# Patient Record
Sex: MALE | Race: WHITE | NOT HISPANIC OR LATINO | Employment: OTHER | URBAN - METROPOLITAN AREA
[De-identification: names, ages, dates, MRNs, and addresses within clinical notes are randomized per-mention and may not be internally consistent; named-entity substitution may affect disease eponyms.]

---

## 2017-01-16 ENCOUNTER — COMMUNICATION - HEALTHEAST (OUTPATIENT)
Dept: CARDIOLOGY | Facility: CLINIC | Age: 82
End: 2017-01-16

## 2017-01-16 DIAGNOSIS — I10 UNSPECIFIED ESSENTIAL HYPERTENSION: ICD-10-CM

## 2017-04-16 ENCOUNTER — COMMUNICATION - HEALTHEAST (OUTPATIENT)
Dept: CARDIOLOGY | Facility: CLINIC | Age: 82
End: 2017-04-16

## 2017-04-16 DIAGNOSIS — I10 UNSPECIFIED ESSENTIAL HYPERTENSION: ICD-10-CM

## 2017-07-24 ENCOUNTER — RECORDS - HEALTHEAST (OUTPATIENT)
Dept: LAB | Facility: CLINIC | Age: 82
End: 2017-07-24

## 2017-07-24 LAB
CHOLEST SERPL-MCNC: 127 MG/DL
FASTING STATUS PATIENT QL REPORTED: NORMAL
HDLC SERPL-MCNC: 56 MG/DL
LDLC SERPL CALC-MCNC: 55 MG/DL
TRIGL SERPL-MCNC: 78 MG/DL

## 2017-09-25 ENCOUNTER — RECORDS - HEALTHEAST (OUTPATIENT)
Dept: ADMINISTRATIVE | Facility: OTHER | Age: 82
End: 2017-09-25

## 2017-10-25 ENCOUNTER — AMBULATORY - HEALTHEAST (OUTPATIENT)
Dept: CARDIOLOGY | Facility: CLINIC | Age: 82
End: 2017-10-25

## 2017-10-25 ENCOUNTER — RECORDS - HEALTHEAST (OUTPATIENT)
Dept: ADMINISTRATIVE | Facility: OTHER | Age: 82
End: 2017-10-25

## 2017-10-25 ENCOUNTER — OFFICE VISIT - HEALTHEAST (OUTPATIENT)
Dept: CARDIOLOGY | Facility: CLINIC | Age: 82
End: 2017-10-25

## 2017-10-25 DIAGNOSIS — I10 ESSENTIAL HYPERTENSION: ICD-10-CM

## 2017-10-25 DIAGNOSIS — I25.10 ATHEROSCLEROSIS OF NATIVE CORONARY ARTERY OF NATIVE HEART WITHOUT ANGINA PECTORIS: ICD-10-CM

## 2017-10-25 DIAGNOSIS — E78.5 HYPERLIPIDEMIA LDL GOAL <70: ICD-10-CM

## 2017-10-25 ASSESSMENT — MIFFLIN-ST. JEOR: SCORE: 1416.49

## 2017-11-20 ENCOUNTER — COMMUNICATION - HEALTHEAST (OUTPATIENT)
Dept: CARDIOLOGY | Facility: CLINIC | Age: 82
End: 2017-11-20

## 2017-11-20 DIAGNOSIS — I25.10 CAD (CORONARY ATHEROSCLEROTIC DISEASE): ICD-10-CM

## 2017-11-20 DIAGNOSIS — I10 HTN (HYPERTENSION): ICD-10-CM

## 2018-07-25 ENCOUNTER — RECORDS - HEALTHEAST (OUTPATIENT)
Dept: LAB | Facility: CLINIC | Age: 83
End: 2018-07-25

## 2018-07-25 LAB
ALBUMIN SERPL-MCNC: 3.8 G/DL (ref 3.5–5)
ALP SERPL-CCNC: 63 U/L (ref 45–120)
ALT SERPL W P-5'-P-CCNC: 15 U/L (ref 0–45)
ANION GAP SERPL CALCULATED.3IONS-SCNC: 7 MMOL/L (ref 5–18)
AST SERPL W P-5'-P-CCNC: 18 U/L (ref 0–40)
BILIRUB SERPL-MCNC: 0.7 MG/DL (ref 0–1)
BUN SERPL-MCNC: 18 MG/DL (ref 8–28)
CALCIUM SERPL-MCNC: 9.5 MG/DL (ref 8.5–10.5)
CHLORIDE BLD-SCNC: 106 MMOL/L (ref 98–107)
CHOLEST SERPL-MCNC: 123 MG/DL
CO2 SERPL-SCNC: 28 MMOL/L (ref 22–31)
CREAT SERPL-MCNC: 0.91 MG/DL (ref 0.7–1.3)
FASTING STATUS PATIENT QL REPORTED: YES
GFR SERPL CREATININE-BSD FRML MDRD: >60 ML/MIN/1.73M2
GLUCOSE BLD-MCNC: 97 MG/DL (ref 70–125)
HDLC SERPL-MCNC: 47 MG/DL
LDLC SERPL CALC-MCNC: 57 MG/DL
POTASSIUM BLD-SCNC: 5 MMOL/L (ref 3.5–5)
PROT SERPL-MCNC: 6.6 G/DL (ref 6–8)
SODIUM SERPL-SCNC: 141 MMOL/L (ref 136–145)
TRIGL SERPL-MCNC: 94 MG/DL

## 2018-10-22 ENCOUNTER — AMBULATORY - HEALTHEAST (OUTPATIENT)
Dept: CARDIOLOGY | Facility: CLINIC | Age: 83
End: 2018-10-22

## 2018-10-22 ENCOUNTER — RECORDS - HEALTHEAST (OUTPATIENT)
Dept: ADMINISTRATIVE | Facility: OTHER | Age: 83
End: 2018-10-22

## 2018-10-25 ENCOUNTER — OFFICE VISIT - HEALTHEAST (OUTPATIENT)
Dept: CARDIOLOGY | Facility: CLINIC | Age: 83
End: 2018-10-25

## 2018-10-25 DIAGNOSIS — I25.10 ATHEROSCLEROSIS OF NATIVE CORONARY ARTERY OF NATIVE HEART, ANGINA PRESENCE UNSPECIFIED: ICD-10-CM

## 2018-10-25 DIAGNOSIS — I10 ESSENTIAL HYPERTENSION: ICD-10-CM

## 2018-10-25 DIAGNOSIS — E78.5 HYPERLIPIDEMIA LDL GOAL <70: ICD-10-CM

## 2018-10-25 RX ORDER — VIT A/VIT C/VIT E/ZINC/COPPER 4296-226
1 CAPSULE ORAL 2 TIMES DAILY
Status: ON HOLD | COMMUNITY
Start: 2018-10-25 | End: 2022-01-01

## 2018-10-25 ASSESSMENT — MIFFLIN-ST. JEOR: SCORE: 1407.42

## 2018-11-01 ENCOUNTER — COMMUNICATION - HEALTHEAST (OUTPATIENT)
Dept: CARDIOLOGY | Facility: CLINIC | Age: 83
End: 2018-11-01

## 2018-11-01 DIAGNOSIS — R68.89 DECREASED EXERCISE TOLERANCE: ICD-10-CM

## 2018-11-01 DIAGNOSIS — I25.10 CAD (CORONARY ARTERY DISEASE): ICD-10-CM

## 2018-11-05 ENCOUNTER — COMMUNICATION - HEALTHEAST (OUTPATIENT)
Dept: CARDIOLOGY | Facility: CLINIC | Age: 83
End: 2018-11-05

## 2018-11-10 ENCOUNTER — COMMUNICATION - HEALTHEAST (OUTPATIENT)
Dept: CARDIOLOGY | Facility: CLINIC | Age: 83
End: 2018-11-10

## 2018-11-10 DIAGNOSIS — I25.10 CAD (CORONARY ATHEROSCLEROTIC DISEASE): ICD-10-CM

## 2018-11-10 DIAGNOSIS — I10 HTN (HYPERTENSION): ICD-10-CM

## 2018-11-14 ENCOUNTER — HOSPITAL ENCOUNTER (OUTPATIENT)
Dept: NUCLEAR MEDICINE | Facility: CLINIC | Age: 83
Discharge: HOME | End: 2018-11-14
Attending: INTERNAL MEDICINE

## 2018-11-14 ENCOUNTER — HOSPITAL ENCOUNTER (OUTPATIENT)
Dept: CARDIOLOGY | Facility: CLINIC | Age: 83
Discharge: HOME | End: 2018-11-14
Attending: INTERNAL MEDICINE

## 2018-11-14 DIAGNOSIS — R68.89 DECREASED EXERCISE TOLERANCE: ICD-10-CM

## 2018-11-14 DIAGNOSIS — I25.10 CAD (CORONARY ARTERY DISEASE): ICD-10-CM

## 2018-11-14 LAB
CV STRESS CURRENT BP HE: NORMAL
CV STRESS CURRENT HR HE: 69
CV STRESS CURRENT HR HE: 69
CV STRESS CURRENT HR HE: 70
CV STRESS CURRENT HR HE: 78
CV STRESS CURRENT HR HE: 78
CV STRESS CURRENT HR HE: 79
CV STRESS CURRENT HR HE: 80
CV STRESS CURRENT HR HE: 80
CV STRESS CURRENT HR HE: 81
CV STRESS CURRENT HR HE: 81
CV STRESS CURRENT HR HE: 85
CV STRESS CURRENT HR HE: 85
CV STRESS CURRENT HR HE: 87
CV STRESS CURRENT HR HE: 88
CV STRESS CURRENT HR HE: 89
CV STRESS CURRENT HR HE: 91
CV STRESS DEVIATION TIME HE: NORMAL
CV STRESS ECHO PERCENT HR HE: NORMAL
CV STRESS EXERCISE STAGE HE: NORMAL
CV STRESS FINAL RESTING BP HE: NORMAL
CV STRESS FINAL RESTING HR HE: 78
CV STRESS MAX HR HE: 91
CV STRESS MAX TREADMILL GRADE HE: 0
CV STRESS MAX TREADMILL SPEED HE: 0
CV STRESS PEAK DIA BP HE: NORMAL
CV STRESS PEAK SYS BP HE: NORMAL
CV STRESS PHASE HE: NORMAL
CV STRESS PROTOCOL HE: NORMAL
CV STRESS RESTING PT POSITION HE: NORMAL
CV STRESS ST DEVIATION AMOUNT HE: NORMAL
CV STRESS ST DEVIATION ELEVATION HE: NORMAL
CV STRESS ST EVELATION AMOUNT HE: NORMAL
CV STRESS TEST TYPE HE: NORMAL
CV STRESS TOTAL STAGE TIME MIN 1 HE: NORMAL
NUC STRESS EJECTION FRACTION: 60 %
STRESS ECHO BASELINE BP: NORMAL
STRESS ECHO BASELINE HR: 69
STRESS ECHO CALCULATED PERCENT HR: 69 %
STRESS ECHO LAST STRESS BP: NORMAL
STRESS ECHO LAST STRESS HR: 85

## 2018-12-20 ENCOUNTER — RECORDS - HEALTHEAST (OUTPATIENT)
Dept: LAB | Facility: CLINIC | Age: 83
End: 2018-12-20

## 2018-12-20 LAB
ALBUMIN SERPL-MCNC: 4 G/DL (ref 3.5–5)
ALP SERPL-CCNC: 55 U/L (ref 45–120)
ALT SERPL W P-5'-P-CCNC: 13 U/L (ref 0–45)
ANION GAP SERPL CALCULATED.3IONS-SCNC: 9 MMOL/L (ref 5–18)
AST SERPL W P-5'-P-CCNC: 19 U/L (ref 0–40)
BASOPHILS # BLD AUTO: 0 THOU/UL (ref 0–0.2)
BASOPHILS NFR BLD AUTO: 1 % (ref 0–2)
BILIRUB SERPL-MCNC: 0.7 MG/DL (ref 0–1)
BUN SERPL-MCNC: 21 MG/DL (ref 8–28)
CALCIUM SERPL-MCNC: 9.9 MG/DL (ref 8.5–10.5)
CHLORIDE BLD-SCNC: 104 MMOL/L (ref 98–107)
CO2 SERPL-SCNC: 28 MMOL/L (ref 22–31)
CREAT SERPL-MCNC: 0.85 MG/DL (ref 0.7–1.3)
EOSINOPHIL # BLD AUTO: 0.1 THOU/UL (ref 0–0.4)
EOSINOPHIL NFR BLD AUTO: 1 % (ref 0–6)
ERYTHROCYTE [DISTWIDTH] IN BLOOD BY AUTOMATED COUNT: 12.1 % (ref 11–14.5)
GFR SERPL CREATININE-BSD FRML MDRD: >60 ML/MIN/1.73M2
GLUCOSE BLD-MCNC: 118 MG/DL (ref 70–125)
HCT VFR BLD AUTO: 44.9 % (ref 40–54)
HGB BLD-MCNC: 14.1 G/DL (ref 14–18)
LYMPHOCYTES # BLD AUTO: 1.3 THOU/UL (ref 0.8–4.4)
LYMPHOCYTES NFR BLD AUTO: 17 % (ref 20–40)
MCH RBC QN AUTO: 31.3 PG (ref 27–34)
MCHC RBC AUTO-ENTMCNC: 31.4 G/DL (ref 32–36)
MCV RBC AUTO: 100 FL (ref 80–100)
MONOCYTES # BLD AUTO: 0.7 THOU/UL (ref 0–0.9)
MONOCYTES NFR BLD AUTO: 9 % (ref 2–10)
NEUTROPHILS # BLD AUTO: 5.6 THOU/UL (ref 2–7.7)
NEUTROPHILS NFR BLD AUTO: 72 % (ref 50–70)
PLATELET # BLD AUTO: 261 THOU/UL (ref 140–440)
PMV BLD AUTO: 10.1 FL (ref 8.5–12.5)
POTASSIUM BLD-SCNC: 4.7 MMOL/L (ref 3.5–5)
PROT SERPL-MCNC: 6.7 G/DL (ref 6–8)
RBC # BLD AUTO: 4.51 MILL/UL (ref 4.4–6.2)
SODIUM SERPL-SCNC: 141 MMOL/L (ref 136–145)
WBC: 7.8 THOU/UL (ref 4–11)

## 2019-01-11 ENCOUNTER — SURGERY - HEALTHEAST (OUTPATIENT)
Dept: GASTROENTEROLOGY | Facility: HOSPITAL | Age: 84
End: 2019-01-11

## 2019-01-11 ASSESSMENT — MIFFLIN-ST. JEOR: SCORE: 1362.06

## 2019-03-28 ENCOUNTER — RECORDS - HEALTHEAST (OUTPATIENT)
Dept: LAB | Facility: CLINIC | Age: 84
End: 2019-03-28

## 2019-03-28 LAB
ALBUMIN SERPL-MCNC: 3 G/DL (ref 3.5–5)
ALP SERPL-CCNC: 100 U/L (ref 45–120)
ALT SERPL W P-5'-P-CCNC: 32 U/L (ref 0–45)
ANION GAP SERPL CALCULATED.3IONS-SCNC: 11 MMOL/L (ref 5–18)
AST SERPL W P-5'-P-CCNC: 18 U/L (ref 0–40)
BILIRUB SERPL-MCNC: 0.4 MG/DL (ref 0–1)
BUN SERPL-MCNC: 19 MG/DL (ref 8–28)
CALCIUM SERPL-MCNC: 9.8 MG/DL (ref 8.5–10.5)
CHLORIDE BLD-SCNC: 102 MMOL/L (ref 98–107)
CO2 SERPL-SCNC: 26 MMOL/L (ref 22–31)
CREAT SERPL-MCNC: 0.87 MG/DL (ref 0.7–1.3)
GFR SERPL CREATININE-BSD FRML MDRD: >60 ML/MIN/1.73M2
GLUCOSE BLD-MCNC: 108 MG/DL (ref 70–125)
POTASSIUM BLD-SCNC: 5.4 MMOL/L (ref 3.5–5)
PROT SERPL-MCNC: 6.6 G/DL (ref 6–8)
SODIUM SERPL-SCNC: 139 MMOL/L (ref 136–145)

## 2019-03-31 LAB — BACTERIA SPEC CULT: ABNORMAL

## 2019-05-08 ENCOUNTER — COMMUNICATION - HEALTHEAST (OUTPATIENT)
Dept: CARDIOLOGY | Facility: CLINIC | Age: 84
End: 2019-05-08

## 2019-05-08 DIAGNOSIS — I10 HTN (HYPERTENSION): ICD-10-CM

## 2019-05-08 DIAGNOSIS — I25.10 CAD (CORONARY ATHEROSCLEROTIC DISEASE): ICD-10-CM

## 2019-08-19 ENCOUNTER — RECORDS - HEALTHEAST (OUTPATIENT)
Dept: LAB | Facility: CLINIC | Age: 84
End: 2019-08-19

## 2019-08-19 LAB
ALBUMIN SERPL-MCNC: 4.1 G/DL (ref 3.5–5)
ALP SERPL-CCNC: 71 U/L (ref 45–120)
ALT SERPL W P-5'-P-CCNC: 28 U/L (ref 0–45)
ANION GAP SERPL CALCULATED.3IONS-SCNC: 7 MMOL/L (ref 5–18)
AST SERPL W P-5'-P-CCNC: 28 U/L (ref 0–40)
BILIRUB SERPL-MCNC: 0.7 MG/DL (ref 0–1)
BUN SERPL-MCNC: 22 MG/DL (ref 8–28)
CALCIUM SERPL-MCNC: 10 MG/DL (ref 8.5–10.5)
CHLORIDE BLD-SCNC: 103 MMOL/L (ref 98–107)
CHOLEST SERPL-MCNC: 152 MG/DL
CO2 SERPL-SCNC: 30 MMOL/L (ref 22–31)
CREAT SERPL-MCNC: 0.87 MG/DL (ref 0.7–1.3)
FASTING STATUS PATIENT QL REPORTED: YES
GFR SERPL CREATININE-BSD FRML MDRD: >60 ML/MIN/1.73M2
GLUCOSE BLD-MCNC: 114 MG/DL (ref 70–125)
HDLC SERPL-MCNC: 52 MG/DL
LDLC SERPL CALC-MCNC: 80 MG/DL
POTASSIUM BLD-SCNC: 4.7 MMOL/L (ref 3.5–5)
PROT SERPL-MCNC: 6.9 G/DL (ref 6–8)
SODIUM SERPL-SCNC: 140 MMOL/L (ref 136–145)
TRIGL SERPL-MCNC: 102 MG/DL

## 2019-09-25 ENCOUNTER — RECORDS - HEALTHEAST (OUTPATIENT)
Dept: LAB | Facility: CLINIC | Age: 84
End: 2019-09-25

## 2019-09-28 LAB — BACTERIA SPEC CULT: ABNORMAL

## 2019-11-03 ENCOUNTER — COMMUNICATION - HEALTHEAST (OUTPATIENT)
Dept: CARDIOLOGY | Facility: CLINIC | Age: 84
End: 2019-11-03

## 2019-11-03 DIAGNOSIS — I10 HTN (HYPERTENSION): ICD-10-CM

## 2019-11-03 DIAGNOSIS — I25.10 CAD (CORONARY ATHEROSCLEROTIC DISEASE): ICD-10-CM

## 2019-11-04 RX ORDER — METOPROLOL SUCCINATE 50 MG/1
TABLET, EXTENDED RELEASE ORAL
Qty: 90 TABLET | Refills: 0 | Status: SHIPPED | OUTPATIENT
Start: 2019-11-04 | End: 2022-01-01

## 2019-11-27 ENCOUNTER — RECORDS - HEALTHEAST (OUTPATIENT)
Dept: ADMINISTRATIVE | Facility: OTHER | Age: 84
End: 2019-11-27

## 2019-12-03 ENCOUNTER — OFFICE VISIT - HEALTHEAST (OUTPATIENT)
Dept: CARDIOLOGY | Facility: CLINIC | Age: 84
End: 2019-12-03

## 2019-12-03 DIAGNOSIS — E78.5 HYPERLIPIDEMIA LDL GOAL <70: ICD-10-CM

## 2019-12-03 DIAGNOSIS — I10 ESSENTIAL HYPERTENSION: ICD-10-CM

## 2019-12-03 DIAGNOSIS — I25.10 ATHEROSCLEROSIS OF NATIVE CORONARY ARTERY OF NATIVE HEART WITHOUT ANGINA PECTORIS: ICD-10-CM

## 2019-12-03 ASSESSMENT — MIFFLIN-ST. JEOR: SCORE: 1398.35

## 2020-01-20 ENCOUNTER — OFFICE VISIT - HEALTHEAST (OUTPATIENT)
Dept: GERIATRICS | Facility: CLINIC | Age: 85
End: 2020-01-20

## 2020-01-20 DIAGNOSIS — M25.552 LEFT HIP PAIN: ICD-10-CM

## 2020-01-20 DIAGNOSIS — R33.9 URINARY RETENTION: ICD-10-CM

## 2020-01-20 DIAGNOSIS — Z71.89 ACP (ADVANCE CARE PLANNING): ICD-10-CM

## 2020-01-20 DIAGNOSIS — Z87.81 S/P LEFT HIP FRACTURE: ICD-10-CM

## 2020-01-21 ENCOUNTER — OFFICE VISIT - HEALTHEAST (OUTPATIENT)
Dept: GERIATRICS | Facility: CLINIC | Age: 85
End: 2020-01-21

## 2020-01-21 DIAGNOSIS — M25.552 LEFT HIP PAIN: ICD-10-CM

## 2020-01-21 DIAGNOSIS — R50.9 FEVER, UNSPECIFIED FEVER CAUSE: ICD-10-CM

## 2020-01-21 DIAGNOSIS — I95.1 ORTHOSTATIC HYPOTENSION: ICD-10-CM

## 2020-01-21 DIAGNOSIS — Z87.81 S/P LEFT HIP FRACTURE: ICD-10-CM

## 2020-01-21 DIAGNOSIS — R33.9 URINARY RETENTION: ICD-10-CM

## 2020-01-22 ENCOUNTER — OFFICE VISIT - HEALTHEAST (OUTPATIENT)
Dept: GERIATRICS | Facility: CLINIC | Age: 85
End: 2020-01-22

## 2020-01-22 DIAGNOSIS — R33.9 URINARY RETENTION: ICD-10-CM

## 2020-01-22 DIAGNOSIS — Z87.81 S/P LEFT HIP FRACTURE: ICD-10-CM

## 2020-01-22 RX ORDER — TAMSULOSIN HYDROCHLORIDE 0.4 MG/1
0.4 CAPSULE ORAL
Status: SHIPPED | COMMUNITY
Start: 2020-01-22 | End: 2022-01-01

## 2020-01-23 ENCOUNTER — OFFICE VISIT - HEALTHEAST (OUTPATIENT)
Dept: GERIATRICS | Facility: CLINIC | Age: 85
End: 2020-01-23

## 2020-01-23 DIAGNOSIS — I95.1 ORTHOSTATIC HYPOTENSION: ICD-10-CM

## 2020-01-23 DIAGNOSIS — Z87.81 S/P LEFT HIP FRACTURE: ICD-10-CM

## 2020-01-23 DIAGNOSIS — M25.552 LEFT HIP PAIN: ICD-10-CM

## 2020-01-23 DIAGNOSIS — R33.9 URINARY RETENTION: ICD-10-CM

## 2020-01-27 ENCOUNTER — OFFICE VISIT - HEALTHEAST (OUTPATIENT)
Dept: GERIATRICS | Facility: CLINIC | Age: 85
End: 2020-01-27

## 2020-01-27 DIAGNOSIS — Z87.81 S/P LEFT HIP FRACTURE: ICD-10-CM

## 2020-01-27 DIAGNOSIS — R33.9 URINARY RETENTION: ICD-10-CM

## 2020-01-30 ENCOUNTER — OFFICE VISIT - HEALTHEAST (OUTPATIENT)
Dept: GERIATRICS | Facility: CLINIC | Age: 85
End: 2020-01-30

## 2020-01-30 DIAGNOSIS — R33.9 URINARY RETENTION: ICD-10-CM

## 2020-01-30 DIAGNOSIS — Z87.81 S/P LEFT HIP FRACTURE: ICD-10-CM

## 2020-01-30 DIAGNOSIS — M25.552 LEFT HIP PAIN: ICD-10-CM

## 2020-01-30 DIAGNOSIS — I95.1 ORTHOSTATIC HYPOTENSION: ICD-10-CM

## 2020-01-31 ENCOUNTER — OFFICE VISIT - HEALTHEAST (OUTPATIENT)
Dept: GERIATRICS | Facility: CLINIC | Age: 85
End: 2020-01-31

## 2020-01-31 DIAGNOSIS — Z87.81 S/P LEFT HIP FRACTURE: ICD-10-CM

## 2020-01-31 DIAGNOSIS — R33.9 URINARY RETENTION: ICD-10-CM

## 2020-02-04 ENCOUNTER — OFFICE VISIT - HEALTHEAST (OUTPATIENT)
Dept: GERIATRICS | Facility: CLINIC | Age: 85
End: 2020-02-04
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
## 2020-02-04 DIAGNOSIS — I95.1 ORTHOSTATIC HYPOTENSION: ICD-10-CM

## 2020-02-04 DIAGNOSIS — R33.9 URINARY RETENTION: ICD-10-CM

## 2020-02-04 DIAGNOSIS — Z87.81 S/P LEFT HIP FRACTURE: ICD-10-CM

## 2020-02-04 DIAGNOSIS — E78.5 HYPERLIPIDEMIA LDL GOAL <70: ICD-10-CM

## 2020-02-05 ENCOUNTER — OFFICE VISIT - HEALTHEAST (OUTPATIENT)
Dept: GERIATRICS | Facility: CLINIC | Age: 85
End: 2020-02-05

## 2020-02-05 DIAGNOSIS — Z87.81 S/P LEFT HIP FRACTURE: ICD-10-CM

## 2020-02-05 DIAGNOSIS — R33.9 URINARY RETENTION: ICD-10-CM

## 2020-02-05 DIAGNOSIS — M25.552 LEFT HIP PAIN: ICD-10-CM

## 2020-02-06 ENCOUNTER — COMMUNICATION - HEALTHEAST (OUTPATIENT)
Dept: CARDIOLOGY | Facility: CLINIC | Age: 85
End: 2020-02-06

## 2020-02-06 DIAGNOSIS — I10 HTN (HYPERTENSION): ICD-10-CM

## 2020-02-06 DIAGNOSIS — I25.10 CAD (CORONARY ATHEROSCLEROTIC DISEASE): ICD-10-CM

## 2020-02-07 ENCOUNTER — AMBULATORY - HEALTHEAST (OUTPATIENT)
Dept: GERIATRICS | Facility: CLINIC | Age: 85
End: 2020-02-07

## 2020-03-11 ENCOUNTER — COMMUNICATION - HEALTHEAST (OUTPATIENT)
Dept: CARDIOLOGY | Facility: CLINIC | Age: 85
End: 2020-03-11

## 2020-03-11 DIAGNOSIS — I25.10 CAD (CORONARY ATHEROSCLEROTIC DISEASE): ICD-10-CM

## 2020-03-11 DIAGNOSIS — I10 HTN (HYPERTENSION): ICD-10-CM

## 2020-03-12 ENCOUNTER — COMMUNICATION - HEALTHEAST (OUTPATIENT)
Dept: CARDIOLOGY | Facility: CLINIC | Age: 85
End: 2020-03-12

## 2020-03-12 DIAGNOSIS — I10 HTN (HYPERTENSION): ICD-10-CM

## 2020-03-12 DIAGNOSIS — I25.10 CAD (CORONARY ATHEROSCLEROTIC DISEASE): ICD-10-CM

## 2020-10-08 ENCOUNTER — RECORDS - HEALTHEAST (OUTPATIENT)
Dept: LAB | Facility: CLINIC | Age: 85
End: 2020-10-08

## 2020-10-08 LAB
ALBUMIN SERPL-MCNC: 3.8 G/DL (ref 3.5–5)
ALP SERPL-CCNC: 78 U/L (ref 45–120)
ALT SERPL W P-5'-P-CCNC: 16 U/L (ref 0–45)
ANION GAP SERPL CALCULATED.3IONS-SCNC: 11 MMOL/L (ref 5–18)
AST SERPL W P-5'-P-CCNC: 21 U/L (ref 0–40)
BASOPHILS # BLD AUTO: 0.1 THOU/UL (ref 0–0.2)
BASOPHILS NFR BLD AUTO: 1 % (ref 0–2)
BILIRUB SERPL-MCNC: 0.5 MG/DL (ref 0–1)
BUN SERPL-MCNC: 21 MG/DL (ref 8–28)
CALCIUM SERPL-MCNC: 9.2 MG/DL (ref 8.5–10.5)
CHLORIDE BLD-SCNC: 107 MMOL/L (ref 98–107)
CO2 SERPL-SCNC: 22 MMOL/L (ref 22–31)
CREAT SERPL-MCNC: 0.88 MG/DL (ref 0.7–1.3)
EOSINOPHIL # BLD AUTO: 0.2 THOU/UL (ref 0–0.4)
EOSINOPHIL NFR BLD AUTO: 3 % (ref 0–6)
ERYTHROCYTE [DISTWIDTH] IN BLOOD BY AUTOMATED COUNT: 12.2 % (ref 11–14.5)
GFR SERPL CREATININE-BSD FRML MDRD: >60 ML/MIN/1.73M2
GLUCOSE BLD-MCNC: 128 MG/DL (ref 70–125)
HCT VFR BLD AUTO: 41.8 % (ref 40–54)
HGB BLD-MCNC: 13.5 G/DL (ref 14–18)
IMM GRANULOCYTES # BLD: 0 THOU/UL
IMM GRANULOCYTES NFR BLD: 1 %
LYMPHOCYTES # BLD AUTO: 1.2 THOU/UL (ref 0.8–4.4)
LYMPHOCYTES NFR BLD AUTO: 15 % (ref 20–40)
MCH RBC QN AUTO: 32.1 PG (ref 27–34)
MCHC RBC AUTO-ENTMCNC: 32.3 G/DL (ref 32–36)
MCV RBC AUTO: 99 FL (ref 80–100)
MONOCYTES # BLD AUTO: 0.6 THOU/UL (ref 0–0.9)
MONOCYTES NFR BLD AUTO: 8 % (ref 2–10)
NEUTROPHILS # BLD AUTO: 5.6 THOU/UL (ref 2–7.7)
NEUTROPHILS NFR BLD AUTO: 73 % (ref 50–70)
PLATELET # BLD AUTO: 313 THOU/UL (ref 140–440)
PMV BLD AUTO: 9.9 FL (ref 8.5–12.5)
POTASSIUM BLD-SCNC: 4.9 MMOL/L (ref 3.5–5)
PROT SERPL-MCNC: 6.9 G/DL (ref 6–8)
RBC # BLD AUTO: 4.21 MILL/UL (ref 4.4–6.2)
SODIUM SERPL-SCNC: 140 MMOL/L (ref 136–145)
WBC: 7.8 THOU/UL (ref 4–11)

## 2021-05-24 ENCOUNTER — RECORDS - HEALTHEAST (OUTPATIENT)
Dept: ADMINISTRATIVE | Facility: CLINIC | Age: 86
End: 2021-05-24

## 2021-05-26 ENCOUNTER — RECORDS - HEALTHEAST (OUTPATIENT)
Dept: ADMINISTRATIVE | Facility: CLINIC | Age: 86
End: 2021-05-26

## 2021-05-27 ENCOUNTER — RECORDS - HEALTHEAST (OUTPATIENT)
Dept: ADMINISTRATIVE | Facility: CLINIC | Age: 86
End: 2021-05-27

## 2021-05-29 ENCOUNTER — RECORDS - HEALTHEAST (OUTPATIENT)
Dept: ADMINISTRATIVE | Facility: CLINIC | Age: 86
End: 2021-05-29

## 2021-05-31 ENCOUNTER — RECORDS - HEALTHEAST (OUTPATIENT)
Dept: ADMINISTRATIVE | Facility: CLINIC | Age: 86
End: 2021-05-31

## 2021-05-31 VITALS — BODY MASS INDEX: 22.82 KG/M2 | WEIGHT: 163 LBS | HEIGHT: 71 IN

## 2021-05-31 VITALS — BODY MASS INDEX: 22.18 KG/M2 | BODY MASS INDEX: 23.01 KG/M2 | WEIGHT: 159 LBS | HEIGHT: 71 IN

## 2021-06-02 ENCOUNTER — RECORDS - HEALTHEAST (OUTPATIENT)
Dept: ADMINISTRATIVE | Facility: CLINIC | Age: 86
End: 2021-06-02

## 2021-06-02 VITALS — HEIGHT: 71 IN | BODY MASS INDEX: 21.14 KG/M2 | WEIGHT: 151 LBS

## 2021-06-02 VITALS — WEIGHT: 161 LBS | BODY MASS INDEX: 22.54 KG/M2 | HEIGHT: 71 IN

## 2021-06-03 VITALS
DIASTOLIC BLOOD PRESSURE: 74 MMHG | HEART RATE: 68 BPM | HEIGHT: 71 IN | SYSTOLIC BLOOD PRESSURE: 144 MMHG | WEIGHT: 159 LBS | RESPIRATION RATE: 16 BRPM | BODY MASS INDEX: 22.26 KG/M2

## 2021-06-04 VITALS
HEART RATE: 98 BPM | SYSTOLIC BLOOD PRESSURE: 140 MMHG | BODY MASS INDEX: 21.2 KG/M2 | TEMPERATURE: 98 F | WEIGHT: 152 LBS | DIASTOLIC BLOOD PRESSURE: 85 MMHG

## 2021-06-04 VITALS
TEMPERATURE: 97.1 F | DIASTOLIC BLOOD PRESSURE: 60 MMHG | HEART RATE: 100 BPM | WEIGHT: 155.2 LBS | SYSTOLIC BLOOD PRESSURE: 101 MMHG | BODY MASS INDEX: 21.65 KG/M2

## 2021-06-04 VITALS
DIASTOLIC BLOOD PRESSURE: 75 MMHG | TEMPERATURE: 98.2 F | BODY MASS INDEX: 21.67 KG/M2 | SYSTOLIC BLOOD PRESSURE: 146 MMHG | WEIGHT: 155.4 LBS | HEART RATE: 62 BPM

## 2021-06-04 VITALS
TEMPERATURE: 98.5 F | BODY MASS INDEX: 21.67 KG/M2 | HEART RATE: 91 BPM | WEIGHT: 155.4 LBS | SYSTOLIC BLOOD PRESSURE: 154 MMHG | DIASTOLIC BLOOD PRESSURE: 71 MMHG

## 2021-06-04 VITALS
DIASTOLIC BLOOD PRESSURE: 65 MMHG | BODY MASS INDEX: 21.65 KG/M2 | TEMPERATURE: 98.6 F | SYSTOLIC BLOOD PRESSURE: 120 MMHG | HEART RATE: 95 BPM | WEIGHT: 155.2 LBS

## 2021-06-05 NOTE — PROGRESS NOTES
[facility] Admission note      Patient: Zbigniew Alcocer  MRN: [id number]  Date of Service: 1/21/2020      [facility] [[id number]]  Reason for Visit     Chief Complaint   Patient presents with     H & P       Code Status     FULL CODE    Assessment     -Left hip periprosthetic fracture of the greateR trochanter  -Acute episode of near syncope /hypotension with systolic blood pressure dropping down to the 80s in therapy.  Review of blood pressures reveal intermittent low blood pressures ; he is on a low-dose of amlodipine.    Also on beta-blockers as well as ARB .  -Acute episode of urinary retention with patient noted to have retention of more than 500 mL this morning  - Acute febrile episode with a temperature of 100.5 no localizing symptoms-subjective concerns over chills and rigors  -Pain management  - HTN  - CAD status post CABG  -Mild anemia  -Generalized weakness    Plan     Patient admitted to the TCU for strengthening and rehab.    He has been given nonoperative management  WBAT with assistive device  PT and OT for early ambulation  Outpatient follow-up with orthopedics recommended in 2 weeks  Pain management has been optimized  Blood pressures reviewed.    He had a significant episode of near syncopal episode with hypotension with low blood pressures noted.  He was working in therapy and became dizzy and lightheaded .his blood pressure dropped to 80 systolic.  He had to be laid down in bed.  On questioning it seems that this is been an ongoing issue with him and he has had frequent episodes of low blood pressure   he is currently on losartan 25 mg twice daily and amlodipine at a low-dose of 2.5 mg.  He also takes metoprolol 50 mg daily.  Plan is to discontinue his amlodipine.  Check  blood pressures and give hold parameters for his losartan based on that.  Check orthostatic blood pressures.  Will adjust further medications based on his blood pressure parameters  Stat labs  ordered for him including CBC BMP mag.  UA UC also to rule out sepsis  Also reviewed this is urinary retention issues.  He has been straight cathed for 500 mL.  PVR check has been ordered for him and will monitor response   he may need a Mello catheter and follow-up with urology if that remains a persistent issue.  We also talked about his subjective concerns of fever and chills   a UA UC has been ordered and we will follow-up on routine labs also to rule out any sepsis.  Monitor him clinically if he has hemodynamically unstable.  He is reporting significantly being stressed due to psychosocial stressors his daughter-in-law is dealing with cancer of the lung and is end-stage   she is currently getting chemotherapy his daughter is going through a divorce settlement and he has been dealing with a difficult home situation also  He is also having difficulty in his home environment and next looking at moving to an [facility] where he and his wife can get better support.   he believes that is what is causing him to have all these difficulties.  Discharge planning also reviewed with him and he is hopeful that he can discharge to an assisted living he understand that he and his wife can no longer discharge to his home environment he is requesting his son and his daughter to help him with that  Close monitoring requested if he continues to be hypotensive and clinical stable he will need to go to the emergency room for further work-up    History     Patient is a very pleasant 89 y.o. male who is admitted to TCU  Patient admitted after falling at home.  He was complaining of left hip pain.  CT revealed a mildly displaced periprosthetic left hip fracture of the greater trochanter.  He was admitted and orthopedic eval was requested.  He has been given nonoperative management per them.  Currently discharged for TCU for PT and OT.  Has underlying history of hypertension and has continued with his prehospital medications.  He was noted to  have a profound episode of near syncope with dizziness lightheadedness.  Blood pressures were down to the 70s.  He was laid down with some improvement in blood pressures but has been intermittently running low blood pressure since admission.  He told me this is an ongoing issues and there are days when his blood pressures are dangerously low and other days when they are normal at home to  He also was noted to have mild anemia however he was asymptomatic and they recommended outpatient work-up for this.  He has underlying history of coronary artery disease and has undergone CABG.  He continues on medical management he was chest pain-free  Patient has been spiking low-grade temperatures he is being monitored very closely.  He is also quite upset because he thought he was voiding well however a PVR check on him has revealed that he is retaining urine greater than 500 mL for straight catheter done today.  A UA UC is sent but is still pending    Past Medical History     Active Ambulatory (Non-Hospital) Problems    Diagnosis     Urinary retention     ACP (advance care planning)     Left hip pain     S/p left hip fracture     Fall, initial encounter     Hyperlipidemia LDL goal <70     Hypertension     Coronary Artery Disease     Past Medical History:   Diagnosis Date     Coronary artery disease      Dysphagia      Hyperlipidemia      Hypertension      Lumbar stenosis        Past Social History     Reviewed, and he  reports that he has never smoked. He has never used smokeless tobacco. He reports current alcohol use of about 1.0 standard drinks of alcohol per week. He reports that he does not use drugs.    Family History     Reviewed, and includes a history of coronary artery disease in his father.  Mother had hypertension 1 of his siblings also had heart disease  His father  from prostate cancer    Medication List   Post Discharge Medication Reconciliation Status: discharge medications reconciled and changed, per  note/orders (see AVS)   Current Outpatient Medications on File Prior to Visit   Medication Sig Dispense Refill     acetaminophen (TYLENOL) 500 MG tablet Take 2 tablets (1,000 mg total) by mouth every 8 (eight) hours.  0     acetaminophen-codeine (TYLENOL #3) 300-30 mg per tablet Take 1 tablet by mouth every 4 (four) hours as needed. 22 tablet 0     ALPRAZolam (XANAX) 0.5 MG tablet Take 1 tablet (0.5 mg total) by mouth every evening. 30 tablet 0     amLODIPine (NORVASC) 2.5 MG tablet Take 2.5 mg by mouth daily.        aspirin 81 MG EC tablet Take 81 mg by mouth daily.       brimonidine (ALPHAGAN) 0.2 % ophthalmic solution Administer 1 drop to the right eye 2 (two) times a day.       cyclobenzaprine (FLEXERIL) 5 MG tablet Take 1 tablet (5 mg total) by mouth 3 (three) times a day for 10 days.  0     ezetimibe (ZETIA) 10 mg tablet Take 5 mg by mouth every evening.        latanoprost (XALATAN) 0.005 % ophthalmic solution Administer 1 drop to the right eye at bedtime.   1     lidocaine 4 % patch Place 1 patch on the skin daily. Remove and discard patch with 12 hours or as directed by MD.  0     losartan (COZAAR) 50 MG tablet Take 25 mg by mouth 2 (two) times a day.        metoprolol succinate (TOPROL-XL) 50 MG 24 hr tablet TAKE 1 TABLET(50 MG) BY MOUTH DAILY 90 tablet 0     nitroglycerin (NITROSTAT) 0.4 MG SL tablet Place 0.4 mg under the tongue every 5 (five) minutes as needed for chest pain.       pantoprazole (PROTONIX) 20 MG tablet Take 20 mg by mouth daily.       rosuvastatin (CRESTOR) 5 MG tablet Take 5 mg by mouth bedtime.       saliva stimulant (BIOTENE ORALBALANCE, GLYCERIN,) gel Take 1 application by mouth as needed.  0     timolol maleate (TIMOPTIC) 0.5 % ophthalmic solution INSTILL ONE DROP INTO THE RIGHT EYE QAM  12     vitamins  A,C,E-zinc-copper (PRESERVISION AREDS) 14,320-226-200 unit-mg-unit cap Take 1 tablet by mouth 2 (two) times a day.        No current facility-administered medications on file prior  "to visit.        Allergies     Allergies   Allergen Reactions     Duloxetine      \"Loss of appetite, disoriented\"     Lisinopril Cough     Pravastatin      Dry mouth       Sulfa (Sulfonamide Antibiotics)      Multiple side effects - sensitive hearing, headache, ect.      Xtampza Er [Oxycodone Myristate]      Chills, night sweats       Review of Systems   A comprehensive review of 14 systems was done. Pertinent findings noted here and in history of present illness. All the rest negative.  Constitutional: Negative.  Negative for fever, chills, he has activity change, appetite change and fatigue.   Became dizzy and lightheaded and had significant drop in blood pressure this morning  HENT: Negative for congestion and facial swelling.    Eyes: Negative for photophobia, redness and visual disturbance.   Respiratory: Negative for cough and chest tightness.    Cardiovascular: Negative for chest pain, palpitations and leg swelling.   Gastrointestinal: Negative for nausea, diarrhea, constipation, blood in stool and abdominal distention.   Genitourinary: Negative.    Musculoskeletal: Negative.  Reporting pain in his left hip he had difficulty walking  Skin: Negative.    Neurological: Negative for dizziness, tremors, syncope, weakness, light-headedness and headaches.   Hematological: Does not bruise/bleed easily.   Psychiatric/Behavioral: Negative.  Reports a lot of psychosocial stressors      Physical Exam     Recent Vitals 1/21/2020   Height -   Weight 155 lbs 3 oz   BSA (m2) 1.88 m2   /65   Pulse 95   Temp 98.6   Temp src -   SpO2 -   Some recent data might be hidden       Constitutional: Oriented to person, place, and time and appears well-developed.  Frail and weak  HEENT:  Normocephalic and atraumatic.  Eyes: Conjunctivae and EOM are normal. Pupils are equal, round, and reactive to light. No discharge.  No scleral icterus. Nose normal. Mouth/Throat: Oropharynx is clear and moist. No oropharyngeal exudate.    NECK: " Normal range of motion. Neck supple. No JVD present. No tracheal deviation present. No thyromegaly present.   CARDIOVASCULAR: Normal rate, regular rhythm and intact distal pulses.  Exam reveals no gallop and no friction rub.  Systolic murmur present.  PULMONARY: Effort normal and breath sounds normal. No respiratory distress.No Wheezing or rales.  ABDOMEN: Soft. Bowel sounds are normal. No distension and no mass.  There is no tenderness. There is no rebound and no guarding. No HSM.  MUSCULOSKELETAL: Normal range of motion. No edema and no tenderness. Mild kyphosis, no tenderness.  Tender over the left hip over the greater trochanter  LYMPH NODES: Has no cervical, supraclavicular, axillary and groin adenopathy.   NEUROLOGICAL: Alert and oriented to person, place, and time. No cranial nerve deficit.  Normal muscle tone. Coordination normal.   GENITOURINARY: Deferred exam.  SKIN: Skin is warm and dry. No rash noted. No erythema. No pallor.   EXTREMITIES: No cyanosis, no clubbing, no edema. No Deformity.  PSYCHIATRIC: Normal mood, affect and behavior.      Lab Results     Recent Results (from the past 240 hour(s))   Basic Metabolic Panel    Collection Time: 01/14/20  3:22 PM   Result Value Ref Range    Sodium 141 136 - 145 mmol/L    Potassium 4.3 3.5 - 5.0 mmol/L    Chloride 105 98 - 107 mmol/L    CO2 27 22 - 31 mmol/L    Anion Gap, Calculation 9 5 - 18 mmol/L    Glucose 99 70 - 125 mg/dL    Calcium 9.1 8.5 - 10.5 mg/dL    BUN 20 8 - 28 mg/dL    Creatinine 0.82 0.70 - 1.30 mg/dL    GFR MDRD Af Amer >60 >60 mL/min/1.73m2    GFR MDRD Non Af Amer >60 >60 mL/min/1.73m2   HM2 (CBC W/O DIFF)    Collection Time: 01/14/20  3:22 PM   Result Value Ref Range    WBC 12.1 (H) 4.0 - 11.0 thou/uL    RBC 4.25 (L) 4.40 - 6.20 mill/uL    Hemoglobin 13.5 (L) 14.0 - 18.0 g/dL    Hematocrit 41.9 40.0 - 54.0 %    MCV 99 80 - 100 fL    MCH 31.8 27.0 - 34.0 pg    MCHC 32.2 32.0 - 36.0 g/dL    RDW 12.2 11.0 - 14.5 %    Platelets 241 140 - 440  thou/uL    MPV 9.5 8.5 - 12.5 fL   HM1 (CBC with Diff)    Collection Time: 01/15/20 11:51 AM   Result Value Ref Range    WBC 6.8 4.0 - 11.0 thou/uL    RBC 3.73 (L) 4.40 - 6.20 mill/uL    Hemoglobin 12.1 (L) 14.0 - 18.0 g/dL    Hematocrit 36.9 (L) 40.0 - 54.0 %    MCV 99 80 - 100 fL    MCH 32.4 27.0 - 34.0 pg    MCHC 32.8 32.0 - 36.0 g/dL    RDW 12.3 11.0 - 14.5 %    Platelets 178 140 - 440 thou/uL    MPV 9.1 8.5 - 12.5 fL    Neutrophils % 76 (H) 50 - 70 %    Lymphocytes % 14 (L) 20 - 40 %    Monocytes % 9 2 - 10 %    Eosinophils % 1 0 - 6 %    Basophils % 0 0 - 2 %    Neutrophils Absolute 5.1 2.0 - 7.7 thou/uL    Lymphocytes Absolute 1.0 0.8 - 4.4 thou/uL    Monocytes Absolute 0.6 0.0 - 0.9 thou/uL    Eosinophils Absolute 0.0 0.0 - 0.4 thou/uL    Basophils Absolute 0.0 0.0 - 0.2 thou/uL   Platelet Count - every other day x 3    Collection Time: 01/16/20  8:04 AM   Result Value Ref Range    Platelets 194 140 - 440 thou/uL            Imaging Results     Xr Femur Left 2 Vws    Result Date: 1/14/2020  EXAM: XR FEMUR LEFT 2 VWS LOCATION: [facility] DATE/TIME: 1/14/2020 2:44 PM INDICATION: fall/femur pain COMPARISON: AP pelvis of the same date.     There is left total hip arthroplasty. There is lucency in the greater trochanter with an oblique linear lucency consistent with a nondisplaced fracture. The trochanter appears mildly expanded and this could represent a pathologic fracture through either a foreign body reaction or metastasis. No dislocation no distal femoral fractures. There is arterial calcification. NOTE: ABNORMAL REPORT THE DICTATION ABOVE DESCRIBES AN ABNORMALITY FOR WHICH FOLLOW-UP IS NEEDED.     Ct Head Without Contrast    Addendum Date: 1/14/2020    Discussed the findings with Dr. Cole at at 3:07 PM. 01/14/2020.    Result Date: 1/14/2020  EXAM: CT HEAD WO CONTRAST LOCATION: [facility] DATE/TIME: 1/14/2020 2:48 PM INDICATION: Head trauma, minor (Age > 65y) fall COMPARISON: Head CT  02/20/2014 TECHNIQUE: Routine without IV contrast. Multiplanar reformats. Dose reduction techniques were used. FINDINGS: INTRACRANIAL CONTENTS: No intracranial hemorrhage. Mild diffuse cerebral parenchymal volume loss. No midline shift. The basilar cisterns are patent. Mild periventricular and scattered foci of deep white matter hypodensities involving both cerebral hemispheres. No CT evidence for an acute infarct. VISUALIZED ORBITS/SINUSES/MASTOIDS: Dislocation on the right lens, age indeterminate. No retro-orbital hemorrhage. No paranasal sinus mucosal disease. No middle ear or mastoid effusion. Extensive atherosclerotic calcification of the cavernous and supraclinoid internal carotid arteries as well as the intracranial vertebral arteries. BONES/SOFT TISSUES: No depressed skull fractures. There is cortical irregularity involving the posterior left maxillary alveolus (series 3 image 30), this is incompletely evaluated on this exam.     1.  No intracranial hemorrhage, mass lesions, hydrocephalus or CT evidence for an acute infarct. 2.  Mild diffuse cerebral parenchymal volume loss. Presumed chronic hypertensive/microvascular ischemic white matter changes. 3.  Dislocation on the right lens, age indeterminate. However it is new compared to head CT 02/20/2014. Please correlate with clinical history and exam. 4.  Cortical irregularity involving the posterior left maxillary alveolus, this is incompletely evaluated on this exam. If there is trauma to the face, this can be better evaluated with dedicated facial bone CT.    Xr Pelvis Ap    Result Date: 1/14/2020  EXAM: XR PELVIS AP LOCATION: [facility] DATE/TIME: 1/14/2020 2:43 PM INDICATION: fall/hip pain COMPARISON: 12/27/2008.     There are bilateral hip arthroplasties. There is lucency in the left greater trochanter which may represent a foreign body reaction no displaced fracture is identified no dislocation seen on this single view. There is mild heterotopic  bone adjacent to the right hip arthroplasty.    Ct Hip Without Contrast Left    Result Date: 1/14/2020  EXAM: CT HIP WO CONTRAST LEFT LOCATION: [facility] DATE/TIME: 1/14/2020 5:35 PM INDICATION: Hip replaced, periprosthetic fracture suspected Fall, left hip pain COMPARISON: 01/14/2019 x-rays. TECHNIQUE: Noncontrast. Axial, sagittal and coronal thin-section reconstruction. Dose reduction techniques were used. CONTRAST: None. FINDINGS: Left total hip arthroplasty. There is an acute mildly displaced periprosthetic fracture on the anterior margin of the proximal portion of the femoral prosthesis at the base of the left greater trochanter as seen on series 4 image 61 and series 5 image 91. Distally, this fracture extends to the lateral cortex of the proximal femoral shaft. No evidence of additional fractures. Advanced degenerative disc disease changes at the L5-S1 interspace. Evaluation of the pelvic intraperitoneal contents demonstrates diverticulosis of the sigmoid colon without evidence of diverticulitis.     Left total hip arthroplasty and an acute mildly displaced periprosthetic fracture at the base of the left greater trochanter.             SARBJIT Ramírez

## 2021-06-05 NOTE — PROGRESS NOTES
[facility] Admission note      Patient: Zbigniew Alcocer  MRN: [id number]  Date of Service: 2/4/2020      [facility] [[id number]]  Reason for Visit     Chief Complaint   Patient presents with     Review Of Multiple Medical Conditions       Code Status     FULL CODE    Assessment     -Left hip periprosthetic fracture of the greateR trochanter  -Acute hypotension noted with persistent low blood pressures.  Patient was recently taken off amlodipine.  -Acute episode of urinary retention urinary Mello catheter has been placed with the patient  -Tachycardia with elevated heart rates of 120  -Pain management  - HTN  - CAD status post CABG  -Mild anemia  -Generalized weakness    Plan     Patient admitted to the TCU for strengthening and rehab.    He has been given nonoperative management  Follow-up done with orthopedics and they recommended short distance walking with walker.  However he will continue with guarded weightbearing on left lower extremity and weightbearing 50% left lower extremity with walker.  No active hip abduction's.  Receives Lidoderm patch is not reporting much pain anymore.  Due to persistent hypotension most of his blood pressure meds are being held.  He is getting tachycardic with heart rates as high as 120 noted.  Plan is to DC losartan.  Continue metoprolol with no holding in spite of low blood pressures in order to control heart rates better.  Also DC his Mello catheter today and give him a voiding trial at his request he has a follow-up with urology        History     Patient is a very pleasant 89 y.o. male who is admitted to TCU  Patient admitted after falling at home.  He was complaining of left hip pain.  CT revealed a mildly displaced periprosthetic left hip fracture of the greater trochanter.  He was admitted and orthopedic eval was requested.  He has been given nonoperative management per them.  Currently discharged for TCU for PT and OT.  He is using an ice  pack on his hip for pain management he is doing well not reporting much pain he was followed up by orthopedics recently and they recommended 50% weightbearing on his left lower extremity with walker  Has underlying history of hypertension and has been taken off amlodipine due to low blood pressure issues.  Currently he is on a low-dose of losartan metoprolol is held frequently due to low blood pressures  Blood pressure noted today again quite hypotensive  He is reporting concerns about tachycardia he has had elevated heart rates which she is attributing to the fact that he is getting medications held  He also was noted to have mild anemia however he was asymptomatic and they recommended outpatient work-up for this.  He has underlying history of coronary artery disease and has undergone CABG.  He continues on medical management he was chest pain-free  PatienT also has developed urinary retention.  He required catheterization multiple times and currently a Mello catheter has been placed with outpatient follow-up with urology   Flomax has been started he is planning to follow-up with urology but wants a voiding trial      Past Medical History     Active Ambulatory (Non-Hospital) Problems    Diagnosis     Urinary retention     ACP (advance care planning)     Left hip pain     S/p left hip fracture     Fall, initial encounter     Hyperlipidemia LDL goal <70     Hypertension     Coronary Artery Disease     Past Medical History:   Diagnosis Date     Coronary artery disease      Dysphagia      Hyperlipidemia      Hypertension      Lumbar stenosis        Past Social History     Reviewed, and he  reports that he has never smoked. He has never used smokeless tobacco. He reports current alcohol use of about 1.0 standard drinks of alcohol per week. He reports that he does not use drugs.    Family History     Reviewed, and includes a history of coronary artery disease in his father.  Mother had hypertension 1 of his siblings also  had heart disease  His father  from prostate cancer    Medication List   Post Discharge Medication Reconciliation Status: discharge medications reconciled and changed, per note/orders (see AVS)   Current Outpatient Medications on File Prior to Visit   Medication Sig Dispense Refill     acetaminophen (TYLENOL) 500 MG tablet Take 2 tablets (1,000 mg total) by mouth every 8 (eight) hours.  0     acetaminophen-codeine (TYLENOL #3) 300-30 mg per tablet Take 1 tablet by mouth every 4 (four) hours as needed. 22 tablet 0     ALPRAZolam (XANAX) 0.5 MG tablet Take 1 tablet (0.5 mg total) by mouth every evening. 30 tablet 0     amLODIPine (NORVASC) 2.5 MG tablet Take 2.5 mg by mouth daily.        aspirin 81 MG EC tablet Take 81 mg by mouth daily.       brimonidine (ALPHAGAN) 0.2 % ophthalmic solution Administer 1 drop to the right eye 2 (two) times a day.       ezetimibe (ZETIA) 10 mg tablet Take 5 mg by mouth every evening.        latanoprost (XALATAN) 0.005 % ophthalmic solution Administer 1 drop to the right eye at bedtime.   1     lidocaine 4 % patch Place 1 patch on the skin daily. Remove and discard patch with 12 hours or as directed by MD.  0     losartan (COZAAR) 50 MG tablet Take 25 mg by mouth 2 (two) times a day.        metoprolol succinate (TOPROL-XL) 50 MG 24 hr tablet TAKE 1 TABLET(50 MG) BY MOUTH DAILY 90 tablet 0     nitroglycerin (NITROSTAT) 0.4 MG SL tablet Place 0.4 mg under the tongue every 5 (five) minutes as needed for chest pain.       pantoprazole (PROTONIX) 20 MG tablet Take 20 mg by mouth daily.       rosuvastatin (CRESTOR) 5 MG tablet Take 5 mg by mouth bedtime.       saliva stimulant (BIOTENE ORALBALANCE, GLYCERIN,) gel Take 1 application by mouth as needed.  0     tamsulosin (FLOMAX) 0.4 mg cap Take 0.4 mg by mouth.       timolol maleate (TIMOPTIC) 0.5 % ophthalmic solution INSTILL ONE DROP INTO THE RIGHT EYE QA  12     vitamins  A,C,E-zinc-copper (PRESERVISION AREDS) [phone]  "unit-mg-unit cap Take 1 tablet by mouth 2 (two) times a day.        No current facility-administered medications on file prior to visit.        Allergies     Allergies   Allergen Reactions     Duloxetine      \"Loss of appetite, disoriented\"     Lisinopril Cough     Pravastatin      Dry mouth       Sulfa (Sulfonamide Antibiotics)      Multiple side effects - sensitive hearing, headache, ect.      Xtampza Er [Oxycodone Myristate]      Chills, night sweats       Review of Systems   A comprehensive review of 14 systems was done. Pertinent findings noted here and in history of present illness. All the rest negative.  Constitutional: Negative.  Negative for fever, chills, he has activity change, appetite change and fatigue.   Became dizzy and lightheaded and had significant drop in blood pressure this morning  HENT: Negative for congestion and facial swelling.    Eyes: Negative for photophobia, redness and visual disturbance.   Respiratory: Negative for cough and chest tightness.    Cardiovascular: Negative for chest pain, palpitations and leg swelling.   Gastrointestinal: Negative for nausea, diarrhea, constipation, blood in stool and abdominal distention.   Genitourinary: Negative.  Mello has been placed  Musculoskeletal: Negative.  Reporting pain in his left hip he had difficulty walking  Skin: Negative.    Neurological: Negative for dizziness, tremors, syncope, weakness, light-headedness and headaches.   Hematological: Does not bruise/bleed easily.   Psychiatric/Behavioral: Negative.  Reports a lot of psychosocial stressors      Physical Exam     Recent Vitals 1/31/2020   Height -   Weight 155 lbs 6 oz   BSA (m2) 1.88 m2   /71   Pulse 91   Temp 98.5   Temp src -   SpO2 -   Some recent data might be hidden   /54     Constitutional: Oriented to person, place, and time and appears well-developed.  Frail and weak  HEENT:  Normocephalic and atraumatic.  Eyes: Conjunctivae and EOM are normal. Pupils are " equal, round, and reactive to light. No discharge.  No scleral icterus. Nose normal. Mouth/Throat: Oropharynx is clear and moist. No oropharyngeal exudate.    NECK: Normal range of motion. Neck supple. No JVD present. No tracheal deviation present. No thyromegaly present.   CARDIOVASCULAR: Normal rate, regular rhythm and intact distal pulses.  Exam reveals no gallop and no friction rub.  Systolic murmur present.  PULMONARY: Effort normal and breath sounds normal. No respiratory distress.No Wheezing or rales.  ABDOMEN: Soft. Bowel sounds are normal. No distension and no mass.  There is no tenderness. There is no rebound and no guarding. No HSM.  MUSCULOSKELETAL: Normal range of motion. No edema and no tenderness. Mild kyphosis, no tenderness.  Tender over the left hip over the greater trochanter  LYMPH NODES: Has no cervical, supraclavicular, axillary and groin adenopathy.   NEUROLOGICAL: Alert and oriented to person, place, and time. No cranial nerve deficit.  Normal muscle tone. Coordination normal.   GENITOURINARY: Deferred exam.  Mello noted  SKIN: Skin is warm and dry. No rash noted. No erythema. No pallor.   EXTREMITIES: No cyanosis, no clubbing, no edema. No Deformity.  PSYCHIATRIC: Normal mood, affect and behavior.      Lab Results     Results for orders placed or performed during the hospital encounter of 01/14/20   Basic Metabolic Panel   Result Value Ref Range    Sodium 141 136 - 145 mmol/L    Potassium 4.3 3.5 - 5.0 mmol/L    Chloride 105 98 - 107 mmol/L    CO2 27 22 - 31 mmol/L    Anion Gap, Calculation 9 5 - 18 mmol/L    Glucose 99 70 - 125 mg/dL    Calcium 9.1 8.5 - 10.5 mg/dL    BUN 20 8 - 28 mg/dL    Creatinine 0.82 0.70 - 1.30 mg/dL    GFR MDRD Af Amer >60 >60 mL/min/1.73m2    GFR MDRD Non Af Amer >60 >60 mL/min/1.73m2                SARBJIT Ramírez

## 2021-06-05 NOTE — PROGRESS NOTES
"  [facility]      NAME:  Zbigniew Alcocer             :  1930    MRN: [id number]    CODE STATUS:  FULL CODE    FACILITY: [facility] [[id number]]    CHIEF COMPLAIN/REASON FOR VISIT:  Chief Complaint   Patient presents with     Review Of Multiple Medical Conditions       HISTORY OF PRESENT ILLNESS: Zbigniew Alcocer is a 89 y.o. male being seen today for review of multiple medical conditions, nursing reports PVR was greater then 500 yesterday. .  He was at [facility] from  through  status post fall with a fractured left hip.CT scan revealed a mildly displaced periprosthetic fracture on the left.  He was seen by orthopedics in acute care and they recommended conservative treatment nonoperative but he was kept in acute care because he was having gait problems as well as pain control.  His PMH includes: Hypertension, CAD, history of CABG, anemia, and insomnia.  Seen in room with wife today and we discussed TCU routine, as well as advanced care planning and medical care for seniors role in his care.  He is having pain in his left hip lidocaine patch noted to be in place with ice packs.  He does report he is having some issues with urination, reports he has a history of retention, we did have nurses check PVRs and he had 1 greater than 500 as well as 1 greater than 300.  He was unable to void when his residual was 300 so we did insert Rosen catheter and I would like him to see , he does have an appointment set up for 2020. His ortho apt was completed and he has new orders, continues with rosen catheter for retention.    Allergies   Allergen Reactions     Duloxetine      \"Loss of appetite, disoriented\"     Lisinopril Cough     Pravastatin      Dry mouth       Sulfa (Sulfonamide Antibiotics)      Multiple side effects - sensitive hearing, headache, ect.      Xtampza Er [Oxycodone Myristate]      Chills, night sweats   :     Current " Outpatient Medications   Medication Sig     acetaminophen (TYLENOL) 500 MG tablet Take 2 tablets (1,000 mg total) by mouth every 8 (eight) hours.     acetaminophen-codeine (TYLENOL #3) 300-30 mg per tablet Take 1 tablet by mouth every 4 (four) hours as needed.     ALPRAZolam (XANAX) 0.5 MG tablet Take 1 tablet (0.5 mg total) by mouth every evening.     amLODIPine (NORVASC) 2.5 MG tablet Take 2.5 mg by mouth daily.      aspirin 81 MG EC tablet Take 81 mg by mouth daily.     brimonidine (ALPHAGAN) 0.2 % ophthalmic solution Administer 1 drop to the right eye 2 (two) times a day.     ezetimibe (ZETIA) 10 mg tablet Take 5 mg by mouth every evening.      latanoprost (XALATAN) 0.005 % ophthalmic solution Administer 1 drop to the right eye at bedtime.      lidocaine 4 % patch Place 1 patch on the skin daily. Remove and discard patch with 12 hours or as directed by MD.     losartan (COZAAR) 50 MG tablet Take 25 mg by mouth 2 (two) times a day.      metoprolol succinate (TOPROL-XL) 50 MG 24 hr tablet TAKE 1 TABLET(50 MG) BY MOUTH DAILY     nitroglycerin (NITROSTAT) 0.4 MG SL tablet Place 0.4 mg under the tongue every 5 (five) minutes as needed for chest pain.     pantoprazole (PROTONIX) 20 MG tablet Take 20 mg by mouth daily.     rosuvastatin (CRESTOR) 5 MG tablet Take 5 mg by mouth bedtime.     saliva stimulant (BIOTENE ORALBALANCE, GLYCERIN,) gel Take 1 application by mouth as needed.     tamsulosin (FLOMAX) 0.4 mg cap Take 0.4 mg by mouth.     timolol maleate (TIMOPTIC) 0.5 % ophthalmic solution INSTILL ONE DROP INTO THE RIGHT EYE QAM     vitamins  A,C,E-zinc-copper (PRESERVISION AREDS) [phone] unit-mg-unit cap Take 1 tablet by mouth 2 (two) times a day.          REVIEW OF SYSTEMS:    Currently, no fever, chills, or rigors. Does not have any visual or hearing problems. Denies any chest pain, headaches, palpitations, lightheadedness, dizziness, shortness of breath, or cough. Appetite is good. Denies any GERD  symptoms. Denies any difficulty with swallowing, nausea, or vomiting.  Denies any abdominal pain, diarrhea or constipation. Some  urinary symptoms. No insomnia. No active bleeding. No rash.       PHYSICAL EXAMINATION:  Vitals:    01/31/20 1735   BP: 154/71   Pulse: 91   Temp: 98.5  F (36.9  C)   Weight: 155 lb 6.4 oz (70.5 kg)         GENERAL: Awake, Alert, oriented x3, not in any form of acute distress, answers questions appropriately, follows simple commands, conversant  HEENT: Head is normocephalic with balding hair distribution. No evidence of trauma. Ears: No acute purulent discharge. Eyes: Conjunctivae pink with no scleral jaundice. Nose: Normal mucosa and septum. NECK: Supple with no cervical or supraclavicular lymphadenopathy. Trachea is midline.   CHEST: No tenderness or deformity, no crepitus  LUNG: Clear to auscultation with good chest expansion. There are no crackles or wheezes, normal AP diameter.  BACK: No kyphosis of the thoracic spine. Symmetric, no curvature, ROM normal, no CVA tenderness, no spinal tenderness   CVS: There is good S1  S2,  rhythm is regular.  ABDOMEN: Globular and soft, nontender to palpation, non distended, no masses, no organomegaly, good bowel sounds, no rebound or guarding, no peritoneal signs.   EXTREMITIES: Atraumatic. Full range of motion on both upper and lower extremities, there is no tenderness to palpation, no pedal edema, aged arthritic  joint swelling.  SKIN: Warm and dry, no erythema noted, no rashes or lesions.  NEUROLOGICAL: The patient is oriented to person, place and time. Strength and sensation are grossly intact. Face is symmetric.                    LABS:    Lab Results   Component Value Date    WBC 6.8 01/15/2020    HGB 12.1 (L) 01/15/2020    HCT 36.9 (L) 01/15/2020    MCV 99 01/15/2020     01/16/2020       Results for orders placed or performed during the hospital encounter of 01/14/20   Basic Metabolic Panel   Result Value Ref Range    Sodium 141 136 -  145 mmol/L    Potassium 4.3 3.5 - 5.0 mmol/L    Chloride 105 98 - 107 mmol/L    CO2 27 22 - 31 mmol/L    Anion Gap, Calculation 9 5 - 18 mmol/L    Glucose 99 70 - 125 mg/dL    Calcium 9.1 8.5 - 10.5 mg/dL    BUN 20 8 - 28 mg/dL    Creatinine 0.82 0.70 - 1.30 mg/dL    GFR MDRD Af Amer >60 >60 mL/min/1.73m2    GFR MDRD Non Af Amer >60 >60 mL/min/1.73m2           No results found for: HGBA1C  No results found for: ENBEQTWJ72PX  No results found for: [id number]    ASSESSMENT/PLAN:  1. S/p left hip fracture    2. Urinary retention      1, Status post left hip fracture/left hip pain.:  Nonsurgical repair, conservative approach.  Patient reports it was only defined as a hairline fracture.  He is having a lot of pain in the hip using ice which is relieving pain, he also has a lidocaine patch in place see med list for full analgesic regime above.  PT/OT for ongoing rehab and therapies skilled nursing service to manage chronic medical conditions during the TCU stay.Ortho has just increased wt bearing but has specific limitations related to abductions.    2.  Urinary retention: Mello cath in place. We have fu with urology on 2/7/20. Nursing staff assists with management.      Electronically signed by:  Pam Gardner CNP  This progress note was completed using Dragon software and there may be grammatical errors.

## 2021-06-05 NOTE — PROGRESS NOTES
[facility] Admission note      Patient: Zbigniew Alcocer  MRN: [id number]  Date of Service: 1/23/2020      [facility] (formerly [facility]) [[id number]]  Reason for Visit     Chief Complaint   Patient presents with     Review Of Multiple Medical Conditions       Code Status     FULL CODE    Assessment     -Left hip periprosthetic fracture of the greateR trochanter  -Acute hypotension noted with persistent low blood pressures.  Patient was recently taken off amlodipine.  -Acute episode of urinary retention urinary Mello catheter has been placed with the patient  -Pain management  - HTN  - CAD status post CABG  -Mild anemia  -Generalized weakness    Plan     Patient admitted to the TCU for strengthening and rehab.    He has been given nonoperative management  Pain is adequately controlled on his regimen and he is using Tylenol 3 quite often.  Remains wheelchair-bound and poorly weightbearing.  Limited by blood pressures again had a little episode of low blood pressure this morning nursing wanted me to evaluate for him his blood pressure was 90/54.  On standing his blood pressures were low and he was getting symptomatic.  This is limiting his ability to participate in therapy.  Plan is to discontinue his losartan 25 mg twice daily.  Offer losartan 25 mg in the morning only if systolics are greater than 135.  At his age he can be given some permissive hypertension.  Also hold parameters given for systolic blood pressures less than 130 for his metoprolol 50 mg daily.  Further adjust medications based on his blood pressure trends  He does have indwelling Mello today and has a follow-up with urology due to persistent urinary retention Flomax 0.4 mg has been added to his regimen suspect that may be adding to his low blood pressures  Continue to monitor blood pressures.  Continue with his PT OT and rehab.  Discharge plan is to look for an assisted living facility        History     Patient is a very pleasant 89  y.o. male who is admitted to TCU  Patient admitted after falling at home.  He was complaining of left hip pain.  CT revealed a mildly displaced periprosthetic left hip fracture of the greater trochanter.  He was admitted and orthopedic eval was requested.  He has been given nonoperative management per them.  Currently discharged for TCU for PT and OT.  He is using an ice pack on his hip for pain management  Has underlying history of hypertension and has been taken off amlodipine due to low blood pressure issues.  Nursing and therapy requested to eval due to persistent low blood pressures this morning blood pressure was 90/54.  He continues on 2 different antihypertensives and they are wondering if they should push it  He also was noted to have mild anemia however he was asymptomatic and they recommended outpatient work-up for this.  He has underlying history of coronary artery disease and has undergone CABG.  He continues on medical management he was chest pain-free  PatienT also has developed urinary retention.  He required catheterization multiple times and currently a Mello catheter has been placed with outpatient follow-up with urology Flomax has been started      Past Medical History     Active Ambulatory (Non-Hospital) Problems    Diagnosis     Urinary retention     ACP (advance care planning)     Left hip pain     S/p left hip fracture     Fall, initial encounter     Hyperlipidemia LDL goal <70     Hypertension     Coronary Artery Disease     Past Medical History:   Diagnosis Date     Coronary artery disease      Dysphagia      Hyperlipidemia      Hypertension      Lumbar stenosis        Past Social History     Reviewed, and he  reports that he has never smoked. He has never used smokeless tobacco. He reports current alcohol use of about 1.0 standard drinks of alcohol per week. He reports that he does not use drugs.    Family History     Reviewed, and includes a history of coronary artery disease in his  father.  Mother had hypertension 1 of his siblings also had heart disease  His father  from prostate cancer    Medication List   Post Discharge Medication Reconciliation Status: discharge medications reconciled and changed, per note/orders (see AVS)   Current Outpatient Medications on File Prior to Visit   Medication Sig Dispense Refill     acetaminophen (TYLENOL) 500 MG tablet Take 2 tablets (1,000 mg total) by mouth every 8 (eight) hours.  0     acetaminophen-codeine (TYLENOL #3) 300-30 mg per tablet Take 1 tablet by mouth every 4 (four) hours as needed. 22 tablet 0     ALPRAZolam (XANAX) 0.5 MG tablet Take 1 tablet (0.5 mg total) by mouth every evening. 30 tablet 0     amLODIPine (NORVASC) 2.5 MG tablet Take 2.5 mg by mouth daily.        aspirin 81 MG EC tablet Take 81 mg by mouth daily.       brimonidine (ALPHAGAN) 0.2 % ophthalmic solution Administer 1 drop to the right eye 2 (two) times a day.       cyclobenzaprine (FLEXERIL) 5 MG tablet Take 1 tablet (5 mg total) by mouth 3 (three) times a day for 10 days.  0     ezetimibe (ZETIA) 10 mg tablet Take 5 mg by mouth every evening.        latanoprost (XALATAN) 0.005 % ophthalmic solution Administer 1 drop to the right eye at bedtime.   1     lidocaine 4 % patch Place 1 patch on the skin daily. Remove and discard patch with 12 hours or as directed by MD.  0     losartan (COZAAR) 50 MG tablet Take 25 mg by mouth 2 (two) times a day.        metoprolol succinate (TOPROL-XL) 50 MG 24 hr tablet TAKE 1 TABLET(50 MG) BY MOUTH DAILY 90 tablet 0     nitroglycerin (NITROSTAT) 0.4 MG SL tablet Place 0.4 mg under the tongue every 5 (five) minutes as needed for chest pain.       pantoprazole (PROTONIX) 20 MG tablet Take 20 mg by mouth daily.       rosuvastatin (CRESTOR) 5 MG tablet Take 5 mg by mouth bedtime.       saliva stimulant (BIOTENE ORALBALANCE, GLYCERIN,) gel Take 1 application by mouth as needed.  0     tamsulosin (FLOMAX) 0.4 mg cap Take 0.4 mg by mouth.        "timolol maleate (TIMOPTIC) 0.5 % ophthalmic solution INSTILL ONE DROP INTO THE RIGHT EYE QAM  12     vitamins  A,C,E-zinc-copper (PRESERVISION AREDS) 14,320-929-200 unit-mg-unit cap Take 1 tablet by mouth 2 (two) times a day.        No current facility-administered medications on file prior to visit.        Allergies     Allergies   Allergen Reactions     Duloxetine      \"Loss of appetite, disoriented\"     Lisinopril Cough     Pravastatin      Dry mouth       Sulfa (Sulfonamide Antibiotics)      Multiple side effects - sensitive hearing, headache, ect.      Xtampza Er [Oxycodone Myristate]      Chills, night sweats       Review of Systems   A comprehensive review of 14 systems was done. Pertinent findings noted here and in history of present illness. All the rest negative.  Constitutional: Negative.  Negative for fever, chills, he has activity change, appetite change and fatigue.   Became dizzy and lightheaded and had significant drop in blood pressure this morning  HENT: Negative for congestion and facial swelling.    Eyes: Negative for photophobia, redness and visual disturbance.   Respiratory: Negative for cough and chest tightness.    Cardiovascular: Negative for chest pain, palpitations and leg swelling.   Gastrointestinal: Negative for nausea, diarrhea, constipation, blood in stool and abdominal distention.   Genitourinary: Negative.  Mello has been placed  Musculoskeletal: Negative.  Reporting pain in his left hip he had difficulty walking  Skin: Negative.    Neurological: Negative for dizziness, tremors, syncope, weakness, light-headedness and headaches.   Hematological: Does not bruise/bleed easily.   Psychiatric/Behavioral: Negative.  Reports a lot of psychosocial stressors      Physical Exam     Recent Vitals 1/22/2020   Height -   Weight 155 lbs 3 oz   BSA (m2) 1.88 m2   /60   Pulse 100   Temp 97.1   Temp src -   SpO2 -   Some recent data might be hidden   BP 90/54    Constitutional: Oriented to " person, place, and time and appears well-developed.  Frail and weak  HEENT:  Normocephalic and atraumatic.  Eyes: Conjunctivae and EOM are normal. Pupils are equal, round, and reactive to light. No discharge.  No scleral icterus. Nose normal. Mouth/Throat: Oropharynx is clear and moist. No oropharyngeal exudate.    NECK: Normal range of motion. Neck supple. No JVD present. No tracheal deviation present. No thyromegaly present.   CARDIOVASCULAR: Normal rate, regular rhythm and intact distal pulses.  Exam reveals no gallop and no friction rub.  Systolic murmur present.  PULMONARY: Effort normal and breath sounds normal. No respiratory distress.No Wheezing or rales.  ABDOMEN: Soft. Bowel sounds are normal. No distension and no mass.  There is no tenderness. There is no rebound and no guarding. No HSM.  MUSCULOSKELETAL: Normal range of motion. No edema and no tenderness. Mild kyphosis, no tenderness.  Tender over the left hip over the greater trochanter  LYMPH NODES: Has no cervical, supraclavicular, axillary and groin adenopathy.   NEUROLOGICAL: Alert and oriented to person, place, and time. No cranial nerve deficit.  Normal muscle tone. Coordination normal.   GENITOURINARY: Deferred exam.  Mello noted  SKIN: Skin is warm and dry. No rash noted. No erythema. No pallor.   EXTREMITIES: No cyanosis, no clubbing, no edema. No Deformity.  PSYCHIATRIC: Normal mood, affect and behavior.      Lab Results     Recent Results (from the past 240 hour(s))   Basic Metabolic Panel    Collection Time: 01/14/20  3:22 PM   Result Value Ref Range    Sodium 141 136 - 145 mmol/L    Potassium 4.3 3.5 - 5.0 mmol/L    Chloride 105 98 - 107 mmol/L    CO2 27 22 - 31 mmol/L    Anion Gap, Calculation 9 5 - 18 mmol/L    Glucose 99 70 - 125 mg/dL    Calcium 9.1 8.5 - 10.5 mg/dL    BUN 20 8 - 28 mg/dL    Creatinine 0.82 0.70 - 1.30 mg/dL    GFR MDRD Af Amer >60 >60 mL/min/1.73m2    GFR MDRD Non Af Amer >60 >60 mL/min/1.73m2   HM2 (CBC W/O DIFF)     Collection Time: 01/14/20  3:22 PM   Result Value Ref Range    WBC 12.1 (H) 4.0 - 11.0 thou/uL    RBC 4.25 (L) 4.40 - 6.20 mill/uL    Hemoglobin 13.5 (L) 14.0 - 18.0 g/dL    Hematocrit 41.9 40.0 - 54.0 %    MCV 99 80 - 100 fL    MCH 31.8 27.0 - 34.0 pg    MCHC 32.2 32.0 - 36.0 g/dL    RDW 12.2 11.0 - 14.5 %    Platelets 241 140 - 440 thou/uL    MPV 9.5 8.5 - 12.5 fL   HM1 (CBC with Diff)    Collection Time: 01/15/20 11:51 AM   Result Value Ref Range    WBC 6.8 4.0 - 11.0 thou/uL    RBC 3.73 (L) 4.40 - 6.20 mill/uL    Hemoglobin 12.1 (L) 14.0 - 18.0 g/dL    Hematocrit 36.9 (L) 40.0 - 54.0 %    MCV 99 80 - 100 fL    MCH 32.4 27.0 - 34.0 pg    MCHC 32.8 32.0 - 36.0 g/dL    RDW 12.3 11.0 - 14.5 %    Platelets 178 140 - 440 thou/uL    MPV 9.1 8.5 - 12.5 fL    Neutrophils % 76 (H) 50 - 70 %    Lymphocytes % 14 (L) 20 - 40 %    Monocytes % 9 2 - 10 %    Eosinophils % 1 0 - 6 %    Basophils % 0 0 - 2 %    Neutrophils Absolute 5.1 2.0 - 7.7 thou/uL    Lymphocytes Absolute 1.0 0.8 - 4.4 thou/uL    Monocytes Absolute 0.6 0.0 - 0.9 thou/uL    Eosinophils Absolute 0.0 0.0 - 0.4 thou/uL    Basophils Absolute 0.0 0.0 - 0.2 thou/uL   Platelet Count - every other day x 3    Collection Time: 01/16/20  8:04 AM   Result Value Ref Range    Platelets 194 140 - 440 thou/uL            Imaging Results     Xr Femur Left 2 Vws    Result Date: 1/14/2020  EXAM: XR FEMUR LEFT 2 VWS LOCATION: [facility] DATE/TIME: 1/14/2020 2:44 PM INDICATION: fall/femur pain COMPARISON: AP pelvis of the same date.     There is left total hip arthroplasty. There is lucency in the greater trochanter with an oblique linear lucency consistent with a nondisplaced fracture. The trochanter appears mildly expanded and this could represent a pathologic fracture through either a foreign body reaction or metastasis. No dislocation no distal femoral fractures. There is arterial calcification. NOTE: ABNORMAL REPORT THE DICTATION ABOVE DESCRIBES AN ABNORMALITY FOR  WHICH FOLLOW-UP IS NEEDED.     Ct Head Without Contrast    Addendum Date: 1/14/2020    Discussed the findings with Dr. Cole at at 3:07 PM. 01/14/2020.    Result Date: 1/14/2020  EXAM: CT HEAD WO CONTRAST LOCATION: [facility] DATE/TIME: 1/14/2020 2:48 PM INDICATION: Head trauma, minor (Age > 65y) fall COMPARISON: Head CT 02/20/2014 TECHNIQUE: Routine without IV contrast. Multiplanar reformats. Dose reduction techniques were used. FINDINGS: INTRACRANIAL CONTENTS: No intracranial hemorrhage. Mild diffuse cerebral parenchymal volume loss. No midline shift. The basilar cisterns are patent. Mild periventricular and scattered foci of deep white matter hypodensities involving both cerebral hemispheres. No CT evidence for an acute infarct. VISUALIZED ORBITS/SINUSES/MASTOIDS: Dislocation on the right lens, age indeterminate. No retro-orbital hemorrhage. No paranasal sinus mucosal disease. No middle ear or mastoid effusion. Extensive atherosclerotic calcification of the cavernous and supraclinoid internal carotid arteries as well as the intracranial vertebral arteries. BONES/SOFT TISSUES: No depressed skull fractures. There is cortical irregularity involving the posterior left maxillary alveolus (series 3 image 30), this is incompletely evaluated on this exam.     1.  No intracranial hemorrhage, mass lesions, hydrocephalus or CT evidence for an acute infarct. 2.  Mild diffuse cerebral parenchymal volume loss. Presumed chronic hypertensive/microvascular ischemic white matter changes. 3.  Dislocation on the right lens, age indeterminate. However it is new compared to head CT 02/20/2014. Please correlate with clinical history and exam. 4.  Cortical irregularity involving the posterior left maxillary alveolus, this is incompletely evaluated on this exam. If there is trauma to the face, this can be better evaluated with dedicated facial bone CT.    Xr Pelvis Ap    Result Date: 1/14/2020  EXAM: XR PELVIS AP LOCATION:  [facility] DATE/TIME: 1/14/2020 2:43 PM INDICATION: fall/hip pain COMPARISON: 12/27/2008.     There are bilateral hip arthroplasties. There is lucency in the left greater trochanter which may represent a foreign body reaction no displaced fracture is identified no dislocation seen on this single view. There is mild heterotopic bone adjacent to the right hip arthroplasty.    Ct Hip Without Contrast Left    Result Date: 1/14/2020  EXAM: CT HIP WO CONTRAST LEFT LOCATION: [facility] DATE/TIME: 1/14/2020 5:35 PM INDICATION: Hip replaced, periprosthetic fracture suspected Fall, left hip pain COMPARISON: 01/14/2019 x-rays. TECHNIQUE: Noncontrast. Axial, sagittal and coronal thin-section reconstruction. Dose reduction techniques were used. CONTRAST: None. FINDINGS: Left total hip arthroplasty. There is an acute mildly displaced periprosthetic fracture on the anterior margin of the proximal portion of the femoral prosthesis at the base of the left greater trochanter as seen on series 4 image 61 and series 5 image 91. Distally, this fracture extends to the lateral cortex of the proximal femoral shaft. No evidence of additional fractures. Advanced degenerative disc disease changes at the L5-S1 interspace. Evaluation of the pelvic intraperitoneal contents demonstrates diverticulosis of the sigmoid colon without evidence of diverticulitis.     Left total hip arthroplasty and an acute mildly displaced periprosthetic fracture at the base of the left greater trochanter.             SARBJIT Ramírez

## 2021-06-05 NOTE — PROGRESS NOTES
"  [facility]      NAME:  Zbigniew Alcocer             :  1930    MRN: [id number]    CODE STATUS:  FULL CODE    FACILITY: [facility] [[id number]]    CHIEF COMPLAIN/REASON FOR VISIT:  Chief Complaint   Patient presents with     Review Of Multiple Medical Conditions       HISTORY OF PRESENT ILLNESS: Zbigniew Alcocer is a 89 y.o. male being seen today for review of multiple medical conditions.  He was at [facility] from  through  status post fall with a fractured left hip.CT scan revealed a mildly displaced periprosthetic fracture on the left.  He was seen by orthopedics in acute care and they recommended conservative treatment nonoperative but he was kept in acute care because he was having gait problems as well as pain control.  His PMH includes: Hypertension, CAD, history of CABG, anemia, and insomnia.  Seen in room with wife today and we discussed TCU routine, as well as advanced care planning and medical care for seniors role in his care.  He is having pain in his left hip lidocaine patch noted to be in place with ice packs.  He does report he is having some issues with urination, reports he has a history of retention.  He is voiding.  No burning or frequency just feels the need to go comes on very quickly.    Allergies   Allergen Reactions     Duloxetine      \"Loss of appetite, disoriented\"     Lisinopril Cough     Pravastatin      Dry mouth       Sulfa (Sulfonamide Antibiotics)      Multiple side effects - sensitive hearing, headache, ect.      Xtampza Er [Oxycodone Myristate]      Chills, night sweats   :     Current Outpatient Medications   Medication Sig     acetaminophen (TYLENOL) 500 MG tablet Take 2 tablets (1,000 mg total) by mouth every 8 (eight) hours.     acetaminophen-codeine (TYLENOL #3) 300-30 mg per tablet Take 1 tablet by mouth every 4 (four) hours as needed.     ALPRAZolam (XANAX) 0.5 MG tablet Take 1 tablet (0.5 mg " total) by mouth every evening.     amLODIPine (NORVASC) 2.5 MG tablet Take 2.5 mg by mouth daily.      aspirin 81 MG EC tablet Take 81 mg by mouth daily.     brimonidine (ALPHAGAN) 0.2 % ophthalmic solution Administer 1 drop to the right eye 2 (two) times a day.     cyclobenzaprine (FLEXERIL) 5 MG tablet Take 1 tablet (5 mg total) by mouth 3 (three) times a day for 10 days.     ezetimibe (ZETIA) 10 mg tablet Take 5 mg by mouth every evening.      latanoprost (XALATAN) 0.005 % ophthalmic solution Administer 1 drop to the right eye at bedtime.      lidocaine 4 % patch Place 1 patch on the skin daily. Remove and discard patch with 12 hours or as directed by MD.     losartan (COZAAR) 50 MG tablet Take 25 mg by mouth 2 (two) times a day.      metoprolol succinate (TOPROL-XL) 50 MG 24 hr tablet TAKE 1 TABLET(50 MG) BY MOUTH DAILY     nitroglycerin (NITROSTAT) 0.4 MG SL tablet Place 0.4 mg under the tongue every 5 (five) minutes as needed for chest pain.     pantoprazole (PROTONIX) 20 MG tablet Take 20 mg by mouth daily.     rosuvastatin (CRESTOR) 5 MG tablet Take 5 mg by mouth bedtime.     saliva stimulant (BIOTENE ORALBALANCE, GLYCERIN,) gel Take 1 application by mouth as needed.     timolol maleate (TIMOPTIC) 0.5 % ophthalmic solution INSTILL ONE DROP INTO THE RIGHT EYE QAM     vitamins  A,C,E-zinc-copper (PRESERVISION AREDS) 14,320-226-200 unit-mg-unit cap Take 1 tablet by mouth 2 (two) times a day.          REVIEW OF SYSTEMS:    Currently, no fever, chills, or rigors. Does not have any visual or hearing problems. Denies any chest pain, headaches, palpitations, lightheadedness, dizziness, shortness of breath, or cough. Appetite is good. Denies any GERD symptoms. Denies any difficulty with swallowing, nausea, or vomiting.  Denies any abdominal pain, diarrhea or constipation. Some  urinary symptoms. No insomnia. No active bleeding. No rash.       PHYSICAL EXAMINATION:  Vitals:    01/21/20 0604   BP: 120/65   Pulse: 95    Temp: 98.6  F (37  C)   Weight: 155 lb 3.2 oz (70.4 kg)         GENERAL: Awake, Alert, oriented x3, not in any form of acute distress, answers questions appropriately, follows simple commands, conversant  HEENT: Head is normocephalic with balding hair distribution. No evidence of trauma. Ears: No acute purulent discharge. Eyes: Conjunctivae pink with no scleral jaundice. Nose: Normal mucosa and septum. NECK: Supple with no cervical or supraclavicular lymphadenopathy. Trachea is midline.   CHEST: No tenderness or deformity, no crepitus  LUNG: Clear to auscultation with good chest expansion. There are no crackles or wheezes, normal AP diameter.  BACK: No kyphosis of the thoracic spine. Symmetric, no curvature, ROM normal, no CVA tenderness, no spinal tenderness   CVS: There is good S1  S2,  rhythm is regular.  ABDOMEN: Globular and soft, nontender to palpation, non distended, no masses, no organomegaly, good bowel sounds, no rebound or guarding, no peritoneal signs.   EXTREMITIES: Atraumatic. Full range of motion on both upper and lower extremities, there is no tenderness to palpation, no pedal edema, aged arthritic  joint swelling.  SKIN: Warm and dry, no erythema noted, no rashes or lesions.  NEUROLOGICAL: The patient is oriented to person, place and time. Strength and sensation are grossly intact. Face is symmetric.                    LABS:    Lab Results   Component Value Date    WBC 6.8 01/15/2020    HGB 12.1 (L) 01/15/2020    HCT 36.9 (L) 01/15/2020    MCV 99 01/15/2020     01/16/2020       Results for orders placed or performed during the hospital encounter of 01/14/20   Basic Metabolic Panel   Result Value Ref Range    Sodium 141 136 - 145 mmol/L    Potassium 4.3 3.5 - 5.0 mmol/L    Chloride 105 98 - 107 mmol/L    CO2 27 22 - 31 mmol/L    Anion Gap, Calculation 9 5 - 18 mmol/L    Glucose 99 70 - 125 mg/dL    Calcium 9.1 8.5 - 10.5 mg/dL    BUN 20 8 - 28 mg/dL    Creatinine 0.82 0.70 - 1.30 mg/dL     GFR MDRD Af Amer >60 >60 mL/min/1.73m2    GFR MDRD Non Af Amer >60 >60 mL/min/1.73m2           No results found for: HGBA1C  No results found for: [id number]  No results found for: [id number]    ASSESSMENT/PLAN:  1. S/p left hip fracture    2. Left hip pain    3. Urinary retention    4. ACP (advance care planning)      1/2, Status post left hip fracture/left hip pain.:  Nonsurgical repair, conservative approach.  Patient reports it was only defined as a hairline fracture.  He is having a lot of pain in the hip using ice which is relieving pain, he also has a lidocaine patch in place see med list for full analgesic regime above.  PT/OT for ongoing rehab and therapies skilled nursing service to manage chronic medical conditions during the TCU stay.    3.  Urinary retention: Patient reports some difficulty with urination and emptying bladder.   we will order PVR x3 to assure that proper emptying is occurring.    4.ACP: Greater than 16 minutes spent face-to-face with patient reviewing had advance care planning.  POLST  has been signed as per patient directions and wishes as full CODE STATUS.        Electronically signed by:  Pam Gardner CNP  This progress note was completed using Dragon software and there may be grammatical errors.

## 2021-06-05 NOTE — PROGRESS NOTES
"  [facility]      NAME:  Zbigniew Alcocer             :  1930    MRN: [id number]    CODE STATUS:  FULL CODE    FACILITY: [facility] [[id number]]    CHIEF COMPLAIN/REASON FOR VISIT:  Chief Complaint   Patient presents with     Review Of Multiple Medical Conditions       HISTORY OF PRESENT ILLNESS: Zbigniew Alcocer is a 89 y.o. male being seen today for review of multiple medical conditions, nursing reports PVR was greater then 500 yesterday. .  He was at [facility] from  through  status post fall with a fractured left hip.CT scan revealed a mildly displaced periprosthetic fracture on the left.  He was seen by orthopedics in acute care and they recommended conservative treatment nonoperative but he was kept in acute care because he was having gait problems as well as pain control.  His PMH includes: Hypertension, CAD, history of CABG, anemia, and insomnia.  Seen in room with wife today and we discussed TCU routine, as well as advanced care planning and medical care for seniors role in his care.  He is having pain in his left hip lidocaine patch noted to be in place with ice packs.  He does report he is having some issues with urination, reports he has a history of retention, we did have nurses check PVRs and he had 1 greater than 500 as well as 1 greater than 300.  He was unable to void when his residual was 300 so we did insert Mello catheter and I would like him to see , he does have an appointment set up for 2020.  Left hip pain has improved he has an Ortho appointment tomorrow.    Allergies   Allergen Reactions     Duloxetine      \"Loss of appetite, disoriented\"     Lisinopril Cough     Pravastatin      Dry mouth       Sulfa (Sulfonamide Antibiotics)      Multiple side effects - sensitive hearing, headache, ect.      Xtampza Er [Oxycodone Myristate]      Chills, night sweats   :     Current Outpatient Medications   Medication " Sig     acetaminophen (TYLENOL) 500 MG tablet Take 2 tablets (1,000 mg total) by mouth every 8 (eight) hours.     acetaminophen-codeine (TYLENOL #3) 300-30 mg per tablet Take 1 tablet by mouth every 4 (four) hours as needed.     ALPRAZolam (XANAX) 0.5 MG tablet Take 1 tablet (0.5 mg total) by mouth every evening.     amLODIPine (NORVASC) 2.5 MG tablet Take 2.5 mg by mouth daily.      aspirin 81 MG EC tablet Take 81 mg by mouth daily.     brimonidine (ALPHAGAN) 0.2 % ophthalmic solution Administer 1 drop to the right eye 2 (two) times a day.     cyclobenzaprine (FLEXERIL) 5 MG tablet Take 1 tablet (5 mg total) by mouth 3 (three) times a day for 10 days.     ezetimibe (ZETIA) 10 mg tablet Take 5 mg by mouth every evening.      latanoprost (XALATAN) 0.005 % ophthalmic solution Administer 1 drop to the right eye at bedtime.      lidocaine 4 % patch Place 1 patch on the skin daily. Remove and discard patch with 12 hours or as directed by MD.     losartan (COZAAR) 50 MG tablet Take 25 mg by mouth 2 (two) times a day.      metoprolol succinate (TOPROL-XL) 50 MG 24 hr tablet TAKE 1 TABLET(50 MG) BY MOUTH DAILY     nitroglycerin (NITROSTAT) 0.4 MG SL tablet Place 0.4 mg under the tongue every 5 (five) minutes as needed for chest pain.     pantoprazole (PROTONIX) 20 MG tablet Take 20 mg by mouth daily.     rosuvastatin (CRESTOR) 5 MG tablet Take 5 mg by mouth bedtime.     saliva stimulant (BIOTENE ORALBALANCE, GLYCERIN,) gel Take 1 application by mouth as needed.     tamsulosin (FLOMAX) 0.4 mg cap Take 0.4 mg by mouth.     timolol maleate (TIMOPTIC) 0.5 % ophthalmic solution INSTILL ONE DROP INTO THE RIGHT EYE QAM     vitamins  A,C,E-zinc-copper (PRESERVISION AREDS) 14,320-226-200 unit-mg-unit cap Take 1 tablet by mouth 2 (two) times a day.          REVIEW OF SYSTEMS:    Currently, no fever, chills, or rigors. Does not have any visual or hearing problems. Denies any chest pain, headaches, palpitations, lightheadedness,  dizziness, shortness of breath, or cough. Appetite is good. Denies any GERD symptoms. Denies any difficulty with swallowing, nausea, or vomiting.  Denies any abdominal pain, diarrhea or constipation. Some  urinary symptoms. No insomnia. No active bleeding. No rash.       PHYSICAL EXAMINATION:  Vitals:    01/27/20 1156   BP: 146/75   Pulse: 62   Temp: 98.2  F (36.8  C)   Weight: 155 lb 6.4 oz (70.5 kg)         GENERAL: Awake, Alert, oriented x3, not in any form of acute distress, answers questions appropriately, follows simple commands, conversant  HEENT: Head is normocephalic with balding hair distribution. No evidence of trauma. Ears: No acute purulent discharge. Eyes: Conjunctivae pink with no scleral jaundice. Nose: Normal mucosa and septum. NECK: Supple with no cervical or supraclavicular lymphadenopathy. Trachea is midline.   CHEST: No tenderness or deformity, no crepitus  LUNG: Clear to auscultation with good chest expansion. There are no crackles or wheezes, normal AP diameter.  BACK: No kyphosis of the thoracic spine. Symmetric, no curvature, ROM normal, no CVA tenderness, no spinal tenderness   CVS: There is good S1  S2,  rhythm is regular.  ABDOMEN: Globular and soft, nontender to palpation, non distended, no masses, no organomegaly, good bowel sounds, no rebound or guarding, no peritoneal signs.   EXTREMITIES: Atraumatic. Full range of motion on both upper and lower extremities, there is no tenderness to palpation, no pedal edema, aged arthritic  joint swelling.  SKIN: Warm and dry, no erythema noted, no rashes or lesions.  NEUROLOGICAL: The patient is oriented to person, place and time. Strength and sensation are grossly intact. Face is symmetric.                    LABS:    Lab Results   Component Value Date    WBC 6.8 01/15/2020    HGB 12.1 (L) 01/15/2020    HCT 36.9 (L) 01/15/2020    MCV 99 01/15/2020     01/16/2020       Results for orders placed or performed during the hospital encounter of  01/14/20   Basic Metabolic Panel   Result Value Ref Range    Sodium 141 136 - 145 mmol/L    Potassium 4.3 3.5 - 5.0 mmol/L    Chloride 105 98 - 107 mmol/L    CO2 27 22 - 31 mmol/L    Anion Gap, Calculation 9 5 - 18 mmol/L    Glucose 99 70 - 125 mg/dL    Calcium 9.1 8.5 - 10.5 mg/dL    BUN 20 8 - 28 mg/dL    Creatinine 0.82 0.70 - 1.30 mg/dL    GFR MDRD Af Amer >60 >60 mL/min/1.73m2    GFR MDRD Non Af Amer >60 >60 mL/min/1.73m2           No results found for: HGBA1C  No results found for: [id number]  No results found for: PSYREDBH46    ASSESSMENT/PLAN:  1. S/p left hip fracture    2. Urinary retention      1, Status post left hip fracture/left hip pain.:  Nonsurgical repair, conservative approach.  Patient reports it was only defined as a hairline fracture.  He is having a lot of pain in the hip using ice which is relieving pain, he also has a lidocaine patch in place see med list for full analgesic regime above.  PT/OT for ongoing rehab and therapies skilled nursing service to manage chronic medical conditions during the TCU stay.    2.  Urinary retention: Patient reports some difficulty with urination and emptying bladder.  He does have a low-grade fever as well and although we checked his UA prior to going and catheter it was negative I suggest that we check another UA to grade fever.    Electronically signed by:  Pam Gardner CNP  This progress note was completed using Dragon software and there may be grammatical errors.

## 2021-06-05 NOTE — PROGRESS NOTES
"  [facility]      NAME:  Zbigniew Alcocer             :  1930    MRN: [id number]    CODE STATUS:  FULL CODE    FACILITY: [facility] [[id number]]    CHIEF COMPLAIN/REASON FOR VISIT:  Chief Complaint   Patient presents with     Review Of Multiple Medical Conditions       HISTORY OF PRESENT ILLNESS: Zbigniew Alcocer is a 89 y.o. male being seen today for review of multiple medical conditions, nursing reports PVR was greater then 500 yesterday. Today, they report he is not able to void, PVR is 300..  He was at [facility] from  through  status post fall with a fractured left hip.CT scan revealed a mildly displaced periprosthetic fracture on the left.  He was seen by orthopedics in acute care and they recommended conservative treatment nonoperative but he was kept in acute care because he was having gait problems as well as pain control.  His PMH includes: Hypertension, CAD, history of CABG, anemia, and insomnia.  Seen in room with wife today and we discussed TCU routine, as well as advanced care planning and medical care for seniors role in his care.  He is having pain in his left hip lidocaine patch noted to be in place with ice packs.  He does report he is having some issues with urination, reports he has a history of retention. We reviewed options of straight cathing or an indwelling cath due to retention. Due to hos hip pain with a lot of movment he opts for indwelling.     Allergies   Allergen Reactions     Duloxetine      \"Loss of appetite, disoriented\"     Lisinopril Cough     Pravastatin      Dry mouth       Sulfa (Sulfonamide Antibiotics)      Multiple side effects - sensitive hearing, headache, ect.      Xtampza Er [Oxycodone Myristate]      Chills, night sweats   :     Current Outpatient Medications   Medication Sig     tamsulosin (FLOMAX) 0.4 mg cap Take 0.4 mg by mouth.     acetaminophen (TYLENOL) 500 MG tablet Take 2 tablets " (1,000 mg total) by mouth every 8 (eight) hours.     acetaminophen-codeine (TYLENOL #3) 300-30 mg per tablet Take 1 tablet by mouth every 4 (four) hours as needed.     ALPRAZolam (XANAX) 0.5 MG tablet Take 1 tablet (0.5 mg total) by mouth every evening.     amLODIPine (NORVASC) 2.5 MG tablet Take 2.5 mg by mouth daily.      aspirin 81 MG EC tablet Take 81 mg by mouth daily.     brimonidine (ALPHAGAN) 0.2 % ophthalmic solution Administer 1 drop to the right eye 2 (two) times a day.     cyclobenzaprine (FLEXERIL) 5 MG tablet Take 1 tablet (5 mg total) by mouth 3 (three) times a day for 10 days.     ezetimibe (ZETIA) 10 mg tablet Take 5 mg by mouth every evening.      latanoprost (XALATAN) 0.005 % ophthalmic solution Administer 1 drop to the right eye at bedtime.      lidocaine 4 % patch Place 1 patch on the skin daily. Remove and discard patch with 12 hours or as directed by MD.     losartan (COZAAR) 50 MG tablet Take 25 mg by mouth 2 (two) times a day.      metoprolol succinate (TOPROL-XL) 50 MG 24 hr tablet TAKE 1 TABLET(50 MG) BY MOUTH DAILY     nitroglycerin (NITROSTAT) 0.4 MG SL tablet Place 0.4 mg under the tongue every 5 (five) minutes as needed for chest pain.     pantoprazole (PROTONIX) 20 MG tablet Take 20 mg by mouth daily.     rosuvastatin (CRESTOR) 5 MG tablet Take 5 mg by mouth bedtime.     saliva stimulant (BIOTENE ORALBALANCE, GLYCERIN,) gel Take 1 application by mouth as needed.     timolol maleate (TIMOPTIC) 0.5 % ophthalmic solution INSTILL ONE DROP INTO THE RIGHT EYE QAM     vitamins  A,C,E-zinc-copper (PRESERVISION AREDS) 14,320-226-200 unit-mg-unit cap Take 1 tablet by mouth 2 (two) times a day.          REVIEW OF SYSTEMS:    Currently, no fever, chills, or rigors. Does not have any visual or hearing problems. Denies any chest pain, headaches, palpitations, lightheadedness, dizziness, shortness of breath, or cough. Appetite is good. Denies any GERD symptoms. Denies any difficulty with swallowing,  nausea, or vomiting.  Denies any abdominal pain, diarrhea or constipation. Some  urinary symptoms. No insomnia. No active bleeding. No rash.       PHYSICAL EXAMINATION:  Vitals:    01/22/20 1200   BP: 101/60   Pulse: 100   Temp: 97.1  F (36.2  C)   Weight: 155 lb 3.2 oz (70.4 kg)         GENERAL: Awake, Alert, oriented x3, not in any form of acute distress, answers questions appropriately, follows simple commands, conversant  HEENT: Head is normocephalic with balding hair distribution. No evidence of trauma. Ears: No acute purulent discharge. Eyes: Conjunctivae pink with no scleral jaundice. Nose: Normal mucosa and septum. NECK: Supple with no cervical or supraclavicular lymphadenopathy. Trachea is midline.   CHEST: No tenderness or deformity, no crepitus  LUNG: Clear to auscultation with good chest expansion. There are no crackles or wheezes, normal AP diameter.  BACK: No kyphosis of the thoracic spine. Symmetric, no curvature, ROM normal, no CVA tenderness, no spinal tenderness   CVS: There is good S1  S2,  rhythm is regular.  ABDOMEN: Globular and soft, nontender to palpation, non distended, no masses, no organomegaly, good bowel sounds, no rebound or guarding, no peritoneal signs.   EXTREMITIES: Atraumatic. Full range of motion on both upper and lower extremities, there is no tenderness to palpation, no pedal edema, aged arthritic  joint swelling.  SKIN: Warm and dry, no erythema noted, no rashes or lesions.  NEUROLOGICAL: The patient is oriented to person, place and time. Strength and sensation are grossly intact. Face is symmetric.                    LABS:    Lab Results   Component Value Date    WBC 6.8 01/15/2020    HGB 12.1 (L) 01/15/2020    HCT 36.9 (L) 01/15/2020    MCV 99 01/15/2020     01/16/2020       Results for orders placed or performed during the hospital encounter of 01/14/20   Basic Metabolic Panel   Result Value Ref Range    Sodium 141 136 - 145 mmol/L    Potassium 4.3 3.5 - 5.0 mmol/L     Chloride 105 98 - 107 mmol/L    CO2 27 22 - 31 mmol/L    Anion Gap, Calculation 9 5 - 18 mmol/L    Glucose 99 70 - 125 mg/dL    Calcium 9.1 8.5 - 10.5 mg/dL    BUN 20 8 - 28 mg/dL    Creatinine 0.82 0.70 - 1.30 mg/dL    GFR MDRD Af Amer >60 >60 mL/min/1.73m2    GFR MDRD Non Af Amer >60 >60 mL/min/1.73m2           No results found for: HGBA1C  No results found for: [id number]  No results found for: HIJHTSKL14    ASSESSMENT/PLAN:  1. S/p left hip fracture    2. Urinary retention      1, Status post left hip fracture/left hip pain.:  Nonsurgical repair, conservative approach.  Patient reports it was only defined as a hairline fracture.  He is having a lot of pain in the hip using ice which is relieving pain, he also has a lidocaine patch in place see med list for full analgesic regime above.  PT/OT for ongoing rehab and therapies skilled nursing service to manage chronic medical conditions during the TCU stay.    2.  Urinary retention: Patient reports some difficulty with urination and emptying bladder. UA initial report inemili MD ordered on 1/21, unremarkable, we will have nursing insert rosen cath and start flomax 0.4mg daily and a fu with .      Electronically signed by:  Pam Gardner CNP  This progress note was completed using Dragon software and there may be grammatical errors.

## 2021-06-05 NOTE — PROGRESS NOTES
[facility] Admission note      Patient: Zbigniew Alcocer  MRN: [id number]  Date of Service: 1/30/2020      [facility] [[id number]]  Reason for Visit     Chief Complaint   Patient presents with     Review Of Multiple Medical Conditions       Code Status     FULL CODE    Assessment     -Left hip periprosthetic fracture of the greateR trochanter  -Acute hypotension noted with persistent low blood pressures.  Patient was recently taken off amlodipine.  -Acute episode of urinary retention urinary Mello catheter has been placed with the patient  -Pain management  - HTN  - CAD status post CABG  -Mild anemia  -Generalized weakness    Plan     Patient admitted to the TCU for strengthening and rehab.    He has been given nonoperative management  Follow-up done with orthopedics and they recommended short distance walking with walker.  However he will continue with guarded weightbearing on left lower extremity and weightbearing 50% left lower extremity with walker.  No active hip abduction's.  He continues with a Mello catheter and will have patient follow-up with urology to discuss voiding trial.  Blood pressures remain in the low side   he is on a low-dose of losartan 25 mg daily and metoprolol is being held based on parameters.  He has had no repeat syncopal near syncopal episodes since then denies symptomatic issues  Discharge plan is to go home        History     Patient is a very pleasant 89 y.o. male who is admitted to TCU  Patient admitted after falling at home.  He was complaining of left hip pain.  CT revealed a mildly displaced periprosthetic left hip fracture of the greater trochanter.  He was admitted and orthopedic eval was requested.  He has been given nonoperative management per them.  Currently discharged for TCU for PT and OT.  He is using an ice pack on his hip for pain management  Has underlying history of hypertension and has been taken off amlodipine due to low blood  pressure issues.  Currently he is on a low-dose of losartan metoprolol is held frequently due to low blood pressures  He also was noted to have mild anemia however he was asymptomatic and they recommended outpatient work-up for this.  He has underlying history of coronary artery disease and has undergone CABG.  He continues on medical management he was chest pain-free  PatienT also has developed urinary retention.  He required catheterization multiple times and currently a Mello catheter has been placed with outpatient follow-up with urology   Flomax has been started      Past Medical History     Active Ambulatory (Non-Hospital) Problems    Diagnosis     Urinary retention     ACP (advance care planning)     Left hip pain     S/p left hip fracture     Fall, initial encounter     Hyperlipidemia LDL goal <70     Hypertension     Coronary Artery Disease     Past Medical History:   Diagnosis Date     Coronary artery disease      Dysphagia      Hyperlipidemia      Hypertension      Lumbar stenosis        Past Social History     Reviewed, and he  reports that he has never smoked. He has never used smokeless tobacco. He reports current alcohol use of about 1.0 standard drinks of alcohol per week. He reports that he does not use drugs.    Family History     Reviewed, and includes a history of coronary artery disease in his father.  Mother had hypertension 1 of his siblings also had heart disease  His father  from prostate cancer    Medication List   Post Discharge Medication Reconciliation Status: discharge medications reconciled and changed, per note/orders (see AVS)   Current Outpatient Medications on File Prior to Visit   Medication Sig Dispense Refill     acetaminophen (TYLENOL) 500 MG tablet Take 2 tablets (1,000 mg total) by mouth every 8 (eight) hours.  0     acetaminophen-codeine (TYLENOL #3) 300-30 mg per tablet Take 1 tablet by mouth every 4 (four) hours as needed. 22 tablet 0     ALPRAZolam (XANAX) 0.5 MG  "tablet Take 1 tablet (0.5 mg total) by mouth every evening. 30 tablet 0     amLODIPine (NORVASC) 2.5 MG tablet Take 2.5 mg by mouth daily.        aspirin 81 MG EC tablet Take 81 mg by mouth daily.       brimonidine (ALPHAGAN) 0.2 % ophthalmic solution Administer 1 drop to the right eye 2 (two) times a day.       ezetimibe (ZETIA) 10 mg tablet Take 5 mg by mouth every evening.        latanoprost (XALATAN) 0.005 % ophthalmic solution Administer 1 drop to the right eye at bedtime.   1     lidocaine 4 % patch Place 1 patch on the skin daily. Remove and discard patch with 12 hours or as directed by MD.  0     losartan (COZAAR) 50 MG tablet Take 25 mg by mouth 2 (two) times a day.        metoprolol succinate (TOPROL-XL) 50 MG 24 hr tablet TAKE 1 TABLET(50 MG) BY MOUTH DAILY 90 tablet 0     nitroglycerin (NITROSTAT) 0.4 MG SL tablet Place 0.4 mg under the tongue every 5 (five) minutes as needed for chest pain.       pantoprazole (PROTONIX) 20 MG tablet Take 20 mg by mouth daily.       rosuvastatin (CRESTOR) 5 MG tablet Take 5 mg by mouth bedtime.       saliva stimulant (BIOTENE ORALBALANCE, GLYCERIN,) gel Take 1 application by mouth as needed.  0     tamsulosin (FLOMAX) 0.4 mg cap Take 0.4 mg by mouth.       timolol maleate (TIMOPTIC) 0.5 % ophthalmic solution INSTILL ONE DROP INTO THE RIGHT EYE QAM  12     vitamins  A,C,E-zinc-copper (PRESERVISION AREDS) 14,320-226-200 unit-mg-unit cap Take 1 tablet by mouth 2 (two) times a day.        No current facility-administered medications on file prior to visit.        Allergies     Allergies   Allergen Reactions     Duloxetine      \"Loss of appetite, disoriented\"     Lisinopril Cough     Pravastatin      Dry mouth       Sulfa (Sulfonamide Antibiotics)      Multiple side effects - sensitive hearing, headache, ect.      Xtampza Er [Oxycodone Myristate]      Chills, night sweats       Review of Systems   A comprehensive review of 14 systems was done. Pertinent findings noted here " and in history of present illness. All the rest negative.  Constitutional: Negative.  Negative for fever, chills, he has activity change, appetite change and fatigue.   Became dizzy and lightheaded and had significant drop in blood pressure this morning  HENT: Negative for congestion and facial swelling.    Eyes: Negative for photophobia, redness and visual disturbance.   Respiratory: Negative for cough and chest tightness.    Cardiovascular: Negative for chest pain, palpitations and leg swelling.   Gastrointestinal: Negative for nausea, diarrhea, constipation, blood in stool and abdominal distention.   Genitourinary: Negative.  Mello has been placed  Musculoskeletal: Negative.  Reporting pain in his left hip he had difficulty walking  Skin: Negative.    Neurological: Negative for dizziness, tremors, syncope, weakness, light-headedness and headaches.   Hematological: Does not bruise/bleed easily.   Psychiatric/Behavioral: Negative.  Reports a lot of psychosocial stressors      Physical Exam     Recent Vitals 1/27/2020   Height -   Weight 155 lbs 6 oz   BSA (m2) 1.88 m2   /75   Pulse 62   Temp 98.2   Temp src -   SpO2 -   Some recent data might be hidden   BP 90/54    Constitutional: Oriented to person, place, and time and appears well-developed.  Frail and weak  HEENT:  Normocephalic and atraumatic.  Eyes: Conjunctivae and EOM are normal. Pupils are equal, round, and reactive to light. No discharge.  No scleral icterus. Nose normal. Mouth/Throat: Oropharynx is clear and moist. No oropharyngeal exudate.    NECK: Normal range of motion. Neck supple. No JVD present. No tracheal deviation present. No thyromegaly present.   CARDIOVASCULAR: Normal rate, regular rhythm and intact distal pulses.  Exam reveals no gallop and no friction rub.  Systolic murmur present.  PULMONARY: Effort normal and breath sounds normal. No respiratory distress.No Wheezing or rales.  ABDOMEN: Soft. Bowel sounds are normal. No distension  and no mass.  There is no tenderness. There is no rebound and no guarding. No HSM.  MUSCULOSKELETAL: Normal range of motion. No edema and no tenderness. Mild kyphosis, no tenderness.  Tender over the left hip over the greater trochanter  LYMPH NODES: Has no cervical, supraclavicular, axillary and groin adenopathy.   NEUROLOGICAL: Alert and oriented to person, place, and time. No cranial nerve deficit.  Normal muscle tone. Coordination normal.   GENITOURINARY: Deferred exam.  Mello noted  SKIN: Skin is warm and dry. No rash noted. No erythema. No pallor.   EXTREMITIES: No cyanosis, no clubbing, no edema. No Deformity.  PSYCHIATRIC: Normal mood, affect and behavior.      Lab Results     Results for orders placed or performed during the hospital encounter of 01/14/20   Basic Metabolic Panel   Result Value Ref Range    Sodium 141 136 - 145 mmol/L    Potassium 4.3 3.5 - 5.0 mmol/L    Chloride 105 98 - 107 mmol/L    CO2 27 22 - 31 mmol/L    Anion Gap, Calculation 9 5 - 18 mmol/L    Glucose 99 70 - 125 mg/dL    Calcium 9.1 8.5 - 10.5 mg/dL    BUN 20 8 - 28 mg/dL    Creatinine 0.82 0.70 - 1.30 mg/dL    GFR MDRD Af Amer >60 >60 mL/min/1.73m2    GFR MDRD Non Af Amer >60 >60 mL/min/1.73m2                SARBJIT Ramírez

## 2021-06-06 NOTE — TELEPHONE ENCOUNTER
"Per [facility] 12-3-19 visit note \" Follow-up with cardiology as needed\" - defer refills to PCP.  mg  "

## 2021-06-09 ENCOUNTER — RECORDS - HEALTHEAST (OUTPATIENT)
Dept: ADMINISTRATIVE | Facility: CLINIC | Age: 86
End: 2021-06-09

## 2021-06-16 PROBLEM — Z71.89 ACP (ADVANCE CARE PLANNING): Status: ACTIVE | Noted: 2020-01-21

## 2021-06-16 PROBLEM — R33.9 URINARY RETENTION: Status: ACTIVE | Noted: 2020-01-21

## 2021-06-16 PROBLEM — Z87.81 S/P LEFT HIP FRACTURE: Status: ACTIVE | Noted: 2020-01-14

## 2021-06-16 PROBLEM — M25.552 LEFT HIP PAIN: Status: ACTIVE | Noted: 2020-01-14

## 2021-06-20 NOTE — LETTER
Letter by oPlly Sevilla MBBS at      Author: Polly Sevilla MBBS Service: -- Author Type: --    Filed:  Encounter Date: 1/21/2020 Status: (Other)         Patient: Zbigniew Alcocer   MR Number: [id number]   YOB: 1930   Date of Visit: 1/21/2020       [facility] Admission note      Patient: Zbigniew Alcocer  MRN: [id number]  Date of Service: 1/21/2020      [facility] [[id number]]  Reason for Visit     Chief Complaint   Patient presents with   ? H & P       Code Status     FULL CODE    Assessment     -Left hip periprosthetic fracture of the greateR trochanter  -Acute episode of near syncope /hypotension with systolic blood pressure dropping down to the 80s in therapy.  Review of blood pressures reveal intermittent low blood pressures ; he is on a low-dose of amlodipine.    Also on beta-blockers as well as ARB .  -Acute episode of urinary retention with patient noted to have retention of more than 500 mL this morning  - Acute febrile episode with a temperature of 100.5 no localizing symptoms-subjective concerns over chills and rigors  -Pain management  - HTN  - CAD status post CABG  -Mild anemia  -Generalized weakness    Plan     Patient admitted to the TCU for strengthening and rehab.    He has been given nonoperative management  WBAT with assistive device  PT and OT for early ambulation  Outpatient follow-up with orthopedics recommended in 2 weeks  Pain management has been optimized  Blood pressures reviewed.    He had a significant episode of near syncopal episode with hypotension with low blood pressures noted.  He was working in therapy and became dizzy and lightheaded .his blood pressure dropped to 80 systolic.  He had to be laid down in bed.  On questioning it seems that this is been an ongoing issue with him and he has had frequent episodes of low blood pressure   he is currently on losartan 25 mg twice daily and amlodipine at a low-dose of 2.5 mg.  He also takes  metoprolol 50 mg daily.  Plan is to discontinue his amlodipine.  Check  blood pressures and give hold parameters for his losartan based on that.  Check orthostatic blood pressures.  Will adjust further medications based on his blood pressure parameters  Stat labs ordered for him including CBC BMP mag.  UA UC also to rule out sepsis  Also reviewed this is urinary retention issues.  He has been straight cathed for 500 mL.  PVR check has been ordered for him and will monitor response   he may need a Mello catheter and follow-up with urology if that remains a persistent issue.  We also talked about his subjective concerns of fever and chills   a UA UC has been ordered and we will follow-up on routine labs also to rule out any sepsis.  Monitor him clinically if he has hemodynamically unstable.  He is reporting significantly being stressed due to psychosocial stressors his daughter-in-law is dealing with cancer of the lung and is end-stage   she is currently getting chemotherapy his daughter is going through a divorce settlement and he has been dealing with a difficult home situation also  He is also having difficulty in his home environment and next looking at moving to an [facility] where he and his wife can get better support.   he believes that is what is causing him to have all these difficulties.  Discharge planning also reviewed with him and he is hopeful that he can discharge to an assisted living he understand that he and his wife can no longer discharge to his home environment he is requesting his son and his daughter to help him with that  Close monitoring requested if he continues to be hypotensive and clinical stable he will need to go to the emergency room for further work-up    History     Patient is a very pleasant 89 y.o. male who is admitted to TCU  Patient admitted after falling at home.  He was complaining of left hip pain.  CT revealed a mildly displaced periprosthetic left hip fracture of the greater  trochanter.  He was admitted and orthopedic eval was requested.  He has been given nonoperative management per them.  Currently discharged for TCU for PT and OT.  Has underlying history of hypertension and has continued with his prehospital medications.  He was noted to have a profound episode of near syncope with dizziness lightheadedness.  Blood pressures were down to the 70s.  He was laid down with some improvement in blood pressures but has been intermittently running low blood pressure since admission.  He told me this is an ongoing issues and there are days when his blood pressures are dangerously low and other days when they are normal at home to  He also was noted to have mild anemia however he was asymptomatic and they recommended outpatient work-up for this.  He has underlying history of coronary artery disease and has undergone CABG.  He continues on medical management he was chest pain-free  Patient has been spiking low-grade temperatures he is being monitored very closely.  He is also quite upset because he thought he was voiding well however a PVR check on him has revealed that he is retaining urine greater than 500 mL for straight catheter done today.  A UA UC is sent but is still pending    Past Medical History     Active Ambulatory (Non-Hospital) Problems    Diagnosis   ? Urinary retention   ? ACP (advance care planning)   ? Left hip pain   ? S/p left hip fracture   ? Fall, initial encounter   ? Hyperlipidemia LDL goal <70   ? Hypertension   ? Coronary Artery Disease     Past Medical History:   Diagnosis Date   ? Coronary artery disease    ? Dysphagia    ? Hyperlipidemia    ? Hypertension    ? Lumbar stenosis        Past Social History     Reviewed, and he  reports that he has never smoked. He has never used smokeless tobacco. He reports current alcohol use of about 1.0 standard drinks of alcohol per week. He reports that he does not use drugs.    Family History     Reviewed, and includes a history  of coronary artery disease in his father.  Mother had hypertension 1 of his siblings also had heart disease  His father  from prostate cancer    Medication List   Post Discharge Medication Reconciliation Status: discharge medications reconciled and changed, per note/orders (see AVS)   Current Outpatient Medications on File Prior to Visit   Medication Sig Dispense Refill   ? acetaminophen (TYLENOL) 500 MG tablet Take 2 tablets (1,000 mg total) by mouth every 8 (eight) hours.  0   ? acetaminophen-codeine (TYLENOL #3) 300-30 mg per tablet Take 1 tablet by mouth every 4 (four) hours as needed. 22 tablet 0   ? ALPRAZolam (XANAX) 0.5 MG tablet Take 1 tablet (0.5 mg total) by mouth every evening. 30 tablet 0   ? amLODIPine (NORVASC) 2.5 MG tablet Take 2.5 mg by mouth daily.      ? aspirin 81 MG EC tablet Take 81 mg by mouth daily.     ? brimonidine (ALPHAGAN) 0.2 % ophthalmic solution Administer 1 drop to the right eye 2 (two) times a day.     ? cyclobenzaprine (FLEXERIL) 5 MG tablet Take 1 tablet (5 mg total) by mouth 3 (three) times a day for 10 days.  0   ? ezetimibe (ZETIA) 10 mg tablet Take 5 mg by mouth every evening.      ? latanoprost (XALATAN) 0.005 % ophthalmic solution Administer 1 drop to the right eye at bedtime.   1   ? lidocaine 4 % patch Place 1 patch on the skin daily. Remove and discard patch with 12 hours or as directed by MD.  0   ? losartan (COZAAR) 50 MG tablet Take 25 mg by mouth 2 (two) times a day.      ? metoprolol succinate (TOPROL-XL) 50 MG 24 hr tablet TAKE 1 TABLET(50 MG) BY MOUTH DAILY 90 tablet 0   ? nitroglycerin (NITROSTAT) 0.4 MG SL tablet Place 0.4 mg under the tongue every 5 (five) minutes as needed for chest pain.     ? pantoprazole (PROTONIX) 20 MG tablet Take 20 mg by mouth daily.     ? rosuvastatin (CRESTOR) 5 MG tablet Take 5 mg by mouth bedtime.     ? saliva stimulant (BIOTENE ORALBALANCE, GLYCERIN,) gel Take 1 application by mouth as needed.  0   ? timolol maleate (TIMOPTIC)  "0.5 % ophthalmic solution INSTILL ONE DROP INTO THE RIGHT EYE QAM  12   ? vitamins  A,C,E-zinc-copper (PRESERVISION AREDS) [id number] unit-mg-unit cap Take 1 tablet by mouth 2 (two) times a day.        No current facility-administered medications on file prior to visit.        Allergies     Allergies   Allergen Reactions   ? Duloxetine      \"Loss of appetite, disoriented\"   ? Lisinopril Cough   ? Pravastatin      Dry mouth     ? Sulfa (Sulfonamide Antibiotics)      Multiple side effects - sensitive hearing, headache, ect.    ? Xtampza Er [Oxycodone Myristate]      Chills, night sweats       Review of Systems   A comprehensive review of 14 systems was done. Pertinent findings noted here and in history of present illness. All the rest negative.  Constitutional: Negative.  Negative for fever, chills, he has activity change, appetite change and fatigue.   Became dizzy and lightheaded and had significant drop in blood pressure this morning  HENT: Negative for congestion and facial swelling.    Eyes: Negative for photophobia, redness and visual disturbance.   Respiratory: Negative for cough and chest tightness.    Cardiovascular: Negative for chest pain, palpitations and leg swelling.   Gastrointestinal: Negative for nausea, diarrhea, constipation, blood in stool and abdominal distention.   Genitourinary: Negative.    Musculoskeletal: Negative.  Reporting pain in his left hip he had difficulty walking  Skin: Negative.    Neurological: Negative for dizziness, tremors, syncope, weakness, light-headedness and headaches.   Hematological: Does not bruise/bleed easily.   Psychiatric/Behavioral: Negative.  Reports a lot of psychosocial stressors      Physical Exam     Recent Vitals 1/21/2020   Height -   Weight 155 lbs 3 oz   BSA (m2) 1.88 m2   /65   Pulse 95   Temp 98.6   Temp src -   SpO2 -   Some recent data might be hidden       Constitutional: Oriented to person, place, and time and appears well-developed.  Frail " and weak  HEENT:  Normocephalic and atraumatic.  Eyes: Conjunctivae and EOM are normal. Pupils are equal, round, and reactive to light. No discharge.  No scleral icterus. Nose normal. Mouth/Throat: Oropharynx is clear and moist. No oropharyngeal exudate.    NECK: Normal range of motion. Neck supple. No JVD present. No tracheal deviation present. No thyromegaly present.   CARDIOVASCULAR: Normal rate, regular rhythm and intact distal pulses.  Exam reveals no gallop and no friction rub.  Systolic murmur present.  PULMONARY: Effort normal and breath sounds normal. No respiratory distress.No Wheezing or rales.  ABDOMEN: Soft. Bowel sounds are normal. No distension and no mass.  There is no tenderness. There is no rebound and no guarding. No HSM.  MUSCULOSKELETAL: Normal range of motion. No edema and no tenderness. Mild kyphosis, no tenderness.  Tender over the left hip over the greater trochanter  LYMPH NODES: Has no cervical, supraclavicular, axillary and groin adenopathy.   NEUROLOGICAL: Alert and oriented to person, place, and time. No cranial nerve deficit.  Normal muscle tone. Coordination normal.   GENITOURINARY: Deferred exam.  SKIN: Skin is warm and dry. No rash noted. No erythema. No pallor.   EXTREMITIES: No cyanosis, no clubbing, no edema. No Deformity.  PSYCHIATRIC: Normal mood, affect and behavior.      Lab Results     Recent Results (from the past 240 hour(s))   Basic Metabolic Panel    Collection Time: 01/14/20  3:22 PM   Result Value Ref Range    Sodium 141 136 - 145 mmol/L    Potassium 4.3 3.5 - 5.0 mmol/L    Chloride 105 98 - 107 mmol/L    CO2 27 22 - 31 mmol/L    Anion Gap, Calculation 9 5 - 18 mmol/L    Glucose 99 70 - 125 mg/dL    Calcium 9.1 8.5 - 10.5 mg/dL    BUN 20 8 - 28 mg/dL    Creatinine 0.82 0.70 - 1.30 mg/dL    GFR MDRD Af Amer >60 >60 mL/min/1.73m2    GFR MDRD Non Af Amer >60 >60 mL/min/1.73m2   HM2 (CBC W/O DIFF)    Collection Time: 01/14/20  3:22 PM   Result Value Ref Range    WBC 12.1  (H) 4.0 - 11.0 thou/uL    RBC 4.25 (L) 4.40 - 6.20 mill/uL    Hemoglobin 13.5 (L) 14.0 - 18.0 g/dL    Hematocrit 41.9 40.0 - 54.0 %    MCV 99 80 - 100 fL    MCH 31.8 27.0 - 34.0 pg    MCHC 32.2 32.0 - 36.0 g/dL    RDW 12.2 11.0 - 14.5 %    Platelets 241 140 - 440 thou/uL    MPV 9.5 8.5 - 12.5 fL   HM1 (CBC with Diff)    Collection Time: 01/15/20 11:51 AM   Result Value Ref Range    WBC 6.8 4.0 - 11.0 thou/uL    RBC 3.73 (L) 4.40 - 6.20 mill/uL    Hemoglobin 12.1 (L) 14.0 - 18.0 g/dL    Hematocrit 36.9 (L) 40.0 - 54.0 %    MCV 99 80 - 100 fL    MCH 32.4 27.0 - 34.0 pg    MCHC 32.8 32.0 - 36.0 g/dL    RDW 12.3 11.0 - 14.5 %    Platelets 178 140 - 440 thou/uL    MPV 9.1 8.5 - 12.5 fL    Neutrophils % 76 (H) 50 - 70 %    Lymphocytes % 14 (L) 20 - 40 %    Monocytes % 9 2 - 10 %    Eosinophils % 1 0 - 6 %    Basophils % 0 0 - 2 %    Neutrophils Absolute 5.1 2.0 - 7.7 thou/uL    Lymphocytes Absolute 1.0 0.8 - 4.4 thou/uL    Monocytes Absolute 0.6 0.0 - 0.9 thou/uL    Eosinophils Absolute 0.0 0.0 - 0.4 thou/uL    Basophils Absolute 0.0 0.0 - 0.2 thou/uL   Platelet Count - every other day x 3    Collection Time: 01/16/20  8:04 AM   Result Value Ref Range    Platelets 194 140 - 440 thou/uL            Imaging Results     Xr Femur Left 2 Vws    Result Date: 1/14/2020  EXAM: XR FEMUR LEFT 2 VWS LOCATION: [facility] DATE/TIME: 1/14/2020 2:44 PM INDICATION: fall/femur pain COMPARISON: AP pelvis of the same date.     There is left total hip arthroplasty. There is lucency in the greater trochanter with an oblique linear lucency consistent with a nondisplaced fracture. The trochanter appears mildly expanded and this could represent a pathologic fracture through either a foreign body reaction or metastasis. No dislocation no distal femoral fractures. There is arterial calcification. NOTE: ABNORMAL REPORT THE DICTATION ABOVE DESCRIBES AN ABNORMALITY FOR WHICH FOLLOW-UP IS NEEDED.     Ct Head Without Contrast    Addendum Date:  1/14/2020    Discussed the findings with Dr. Cole at at 3:07 PM. 01/14/2020.    Result Date: 1/14/2020  EXAM: CT HEAD WO CONTRAST LOCATION: [facility] DATE/TIME: 1/14/2020 2:48 PM INDICATION: Head trauma, minor (Age > 65y) fall COMPARISON: Head CT 02/20/2014 TECHNIQUE: Routine without IV contrast. Multiplanar reformats. Dose reduction techniques were used. FINDINGS: INTRACRANIAL CONTENTS: No intracranial hemorrhage. Mild diffuse cerebral parenchymal volume loss. No midline shift. The basilar cisterns are patent. Mild periventricular and scattered foci of deep white matter hypodensities involving both cerebral hemispheres. No CT evidence for an acute infarct. VISUALIZED ORBITS/SINUSES/MASTOIDS: Dislocation on the right lens, age indeterminate. No retro-orbital hemorrhage. No paranasal sinus mucosal disease. No middle ear or mastoid effusion. Extensive atherosclerotic calcification of the cavernous and supraclinoid internal carotid arteries as well as the intracranial vertebral arteries. BONES/SOFT TISSUES: No depressed skull fractures. There is cortical irregularity involving the posterior left maxillary alveolus (series 3 image 30), this is incompletely evaluated on this exam.     1.  No intracranial hemorrhage, mass lesions, hydrocephalus or CT evidence for an acute infarct. 2.  Mild diffuse cerebral parenchymal volume loss. Presumed chronic hypertensive/microvascular ischemic white matter changes. 3.  Dislocation on the right lens, age indeterminate. However it is new compared to head CT 02/20/2014. Please correlate with clinical history and exam. 4.  Cortical irregularity involving the posterior left maxillary alveolus, this is incompletely evaluated on this exam. If there is trauma to the face, this can be better evaluated with dedicated facial bone CT.    Xr Pelvis Ap    Result Date: 1/14/2020  EXAM: XR PELVIS AP LOCATION: [facility] DATE/TIME: 1/14/2020 2:43 PM INDICATION: fall/hip pain  COMPARISON: 12/27/2008.     There are bilateral hip arthroplasties. There is lucency in the left greater trochanter which may represent a foreign body reaction no displaced fracture is identified no dislocation seen on this single view. There is mild heterotopic bone adjacent to the right hip arthroplasty.    Ct Hip Without Contrast Left    Result Date: 1/14/2020  EXAM: CT HIP WO CONTRAST LEFT LOCATION: [facility] DATE/TIME: 1/14/2020 5:35 PM INDICATION: Hip replaced, periprosthetic fracture suspected Fall, left hip pain COMPARISON: 01/14/2019 x-rays. TECHNIQUE: Noncontrast. Axial, sagittal and coronal thin-section reconstruction. Dose reduction techniques were used. CONTRAST: None. FINDINGS: Left total hip arthroplasty. There is an acute mildly displaced periprosthetic fracture on the anterior margin of the proximal portion of the femoral prosthesis at the base of the left greater trochanter as seen on series 4 image 61 and series 5 image 91. Distally, this fracture extends to the lateral cortex of the proximal femoral shaft. No evidence of additional fractures. Advanced degenerative disc disease changes at the L5-S1 interspace. Evaluation of the pelvic intraperitoneal contents demonstrates diverticulosis of the sigmoid colon without evidence of diverticulitis.     Left total hip arthroplasty and an acute mildly displaced periprosthetic fracture at the base of the left greater trochanter.             SARBJIT Ramírez

## 2021-06-20 NOTE — LETTER
Letter by Pam Gardner CNP at      Author: Pam Gardner CNP Service: -- Author Type: --    Filed:  Encounter Date: 2020 Status: (Other)         Patient: Zbigniew Alcocer   MR Number: [id number]   YOB: 1930   Date of Visit: 2020     [facility]      NAME:  Zbigniew Alcocer             :  1930  MRN: [id number]  CODE STATUS:  FULL CODE    VISIT TYPE: DISCHARGE SUMMARY  FACILYTY: [facility] [[id number]]      HOSPITALIZATION:  [facility]  to 2020               PRIMARY CARE PROVIDER: Jeferson Scott MD    DISCHARGE DIAGNOSIS:      1. Left hip pain    2. S/p left hip fracture    3. Urinary retention         DISCHARGE MEDICATIONS:         Medication List          Accurate as of 2020 11:59 PM. If you have any questions, ask your nurse or doctor.            CONTINUE taking these medications    acetaminophen 500 MG tablet  Commonly known as:  TYLENOL  Take 2 tablets (1,000 mg total) by mouth every 8 (eight) hours.     acetaminophen-codeine 300-30 mg per tablet  Commonly known as:  TYLENOL #3  Take 1 tablet by mouth every 4 (four) hours as needed.     ALPRAZolam 0.5 MG tablet  Commonly known as:  XANAX  Take 1 tablet (0.5 mg total) by mouth every evening.     aspirin 81 MG EC tablet     brimonidine 0.2 % ophthalmic solution  Commonly known as:  ALPHAGAN     ezetimibe 10 mg tablet  Commonly known as:  ZETIA     latanoprost 0.005 % ophthalmic solution  Commonly known as:  XALATAN     metoprolol succinate 50 MG 24 hr tablet  Commonly known as:  TOPROL-XL  TAKE 1 TABLET(50 MG) BY MOUTH DAILY     nitroglycerin 0.4 MG SL tablet  Commonly known as:  NITROSTAT     pantoprazole 20 MG tablet  Commonly known as:  PROTONIX     PreserVision AREDS 14,320-226-200 unit-mg-unit Cap  Generic drug:  vitamins  A,C,E-zinc-copper     rosuvastatin 5 MG tablet  Commonly known as:  CRESTOR     tamsulosin 0.4 mg Cap  Commonly known as:  FLOMAX      timolol maleate 0.5 % ophthalmic solution  Commonly known as:  TIMOPTIC        STOP taking these medications    amLODIPine 2.5 MG tablet  Commonly known as:  NORVASC     lidocaine 4 % patch     losartan 50 MG tablet  Commonly known as:  COZAAR     saliva stimulant gel  Commonly known as:  Biotene Oralbalance (glycerin)            HISTORY OF PRESENT ILLNESS: Zbigniew Alcocer is a 89 y.o. male is being seen for a face to face visit for an anticipated dc on 2/520. He was at [facility] from January 14 through January 17 status post fall with a fractured left hip.CT scan revealed a mildly displaced periprosthetic fracture on the left.  He was seen by orthopedics in acute care and they recommended conservative treatment nonoperative but he was kept in acute care because he was having gait problems as well as pain control.  His PMH includes: Hypertension, CAD, history of CABG, anemia, and insomnia. He has been a bit anxious and pulses increasing, nurse reported 120, my apical revealed 98. I will adjust Metaprel to give 25 mg po qam and 25 mg pm for BP coverage, low in past as well. To see  he will discuss benefits of remaining on Flomax as it may be effecting his BP and pulse as well. He is also to follow with his PCP in a week or so.    SKILLED NURSING FACILITY COURSE:  During this TCU stay, patient completed all anticipated goals of therapy.      PHYSICAL EXAMINATION:    Vitals:    02/06/20 0805   BP: 140/85   Pulse: 98   Temp: 98  F (36.7  C)   Weight: 152 lb (68.9 kg)         GENERAL: Awake, Alert, oriented x3, not in any form of acute distress, answers questions appropriately, follows simple commands, conversant  HEENT: Head is normocephalic with normal hair distribution. No evidence of trauma. Ears: No acute purulent discharge. Eyes: Conjunctivae pink with no scleral jaundice. Nose: Normal mucosa and septum. NECK: Supple with no cervical or supraclavicular lymphadenopathy. Trachea is midline.   CHEST:  No tenderness or deformity, no crepitus  LUNG: Clear to auscultation with good chest expansion. There are no crackles or wheezes, normal AP diameter.  BACK: No kyphosis of the thoracic spine. Symmetric, no curvature, ROM normal, no CVA tenderness, no spinal tenderness   CVS: There is good S1  S2, rhythm is regular.  ABDOMEN: Globular and soft, nontender to palpation, non distended, no masses, no organomegaly, good bowel sounds, no rebound or guarding, no peritoneal signs.   EXTREMITIES: Atraumatic. Full range of motion on both upper and lower extremities, there is no tenderness to palpation, trace  pedal edema,  no joint swelling.  SKIN: Warm and dry, no erythema noted, no rashes or lesions.  NEUROLOGICAL: The patient is oriented to person, place and time. Strength and sensation are grossly intact. Face is symmetric.      LABS:  All labs reviewed in the nursing home record.        DISCHARGE PLAN: I certify that this patient is under Dr. Sevilla's care, seen by the NP, and had a face-to-face encounter that meets the physician face-to-face encounter requirements.  The encounter was in whole, or part related to the primary reason for home health.  The Patient is homebound due to: Left hip fracture it is , taxing and it will take a considerable amount of effort for patient to leave the home.  She is dependent on others for transportation.  The patient is confined to her home and needs intermittent skilled nursing, PT,OT, RN, and HHA.  The patient has been under the care of Dr. Sevilla/NP and Dr. Sevilla  initiated the establishment of the plan of care.        Patient to be followed by home care for physical therapy to eval and treat for strengthening, balance, endurance, and safety with mobility, and ambulation.  Patient to be followed by home care for occupational therapy to eval and treat for strengthening, ADL needs, adaptive equipment, and safety.  Patient to be followed by home care for nursing services for medication  set up and teaching, symptom and disease processes monitoring and education.    Patient to be followed by home care for home health aid services for bathing and ADL needs.  Planned discharge.  All therapy goals have been met.  Family will assist with discharge and transportation.      Patient will follow up with PCP within 7 days after discharge for medication mangagment and appropriate lab studies.          Electronically signed by:  Pam Gardner CNP  This progress note was completed using Dragon software and there may be grammatical errors.      For documentation purposes, chart review, medication management, and discharge coordination of care was greater than 35 minutes

## 2021-06-20 NOTE — LETTER
Letter by Polly Sevilla MBBS at      Author: Polly Sevilla MBBS Service: -- Author Type: --    Filed:  Encounter Date: 2/4/2020 Status: (Other)         Patient: Zbigniew Alcocer   MR Number: [id number]   YOB: 1930   Date of Visit: 2/4/2020       [facility] Admission note      Patient: Zbigniew Alcocer  MRN: [id number]  Date of Service: 2/4/2020      [facility] [[id number]]  Reason for Visit     Chief Complaint   Patient presents with   ? Review Of Multiple Medical Conditions       Code Status     FULL CODE    Assessment     -Left hip periprosthetic fracture of the greateR trochanter  -Acute hypotension noted with persistent low blood pressures.  Patient was recently taken off amlodipine.  -Acute episode of urinary retention urinary Mello catheter has been placed with the patient  -Tachycardia with elevated heart rates of 120  -Pain management  - HTN  - CAD status post CABG  -Mild anemia  -Generalized weakness    Plan     Patient admitted to the TCU for strengthening and rehab.    He has been given nonoperative management  Follow-up done with orthopedics and they recommended short distance walking with walker.  However he will continue with guarded weightbearing on left lower extremity and weightbearing 50% left lower extremity with walker.  No active hip abduction's.  Receives Lidoderm patch is not reporting much pain anymore.  Due to persistent hypotension most of his blood pressure meds are being held.  He is getting tachycardic with heart rates as high as 120 noted.  Plan is to DC losartan.  Continue metoprolol with no holding in spite of low blood pressures in order to control heart rates better.  Also DC his Mello catheter today and give him a voiding trial at his request he has a follow-up with urology        History     Patient is a very pleasant 89 y.o. male who is admitted to TCU  Patient admitted after falling at home.  He was complaining of left hip pain.   CT revealed a mildly displaced periprosthetic left hip fracture of the greater trochanter.  He was admitted and orthopedic eval was requested.  He has been given nonoperative management per them.  Currently discharged for TCU for PT and OT.  He is using an ice pack on his hip for pain management he is doing well not reporting much pain he was followed up by orthopedics recently and they recommended 50% weightbearing on his left lower extremity with walker  Has underlying history of hypertension and has been taken off amlodipine due to low blood pressure issues.  Currently he is on a low-dose of losartan metoprolol is held frequently due to low blood pressures  Blood pressure noted today again quite hypotensive  He is reporting concerns about tachycardia he has had elevated heart rates which she is attributing to the fact that he is getting medications held  He also was noted to have mild anemia however he was asymptomatic and they recommended outpatient work-up for this.  He has underlying history of coronary artery disease and has undergone CABG.  He continues on medical management he was chest pain-free  PatienT also has developed urinary retention.  He required catheterization multiple times and currently a Mello catheter has been placed with outpatient follow-up with urology   Flomax has been started he is planning to follow-up with urology but wants a voiding trial      Past Medical History     Active Ambulatory (Non-Hospital) Problems    Diagnosis   ? Urinary retention   ? ACP (advance care planning)   ? Left hip pain   ? S/p left hip fracture   ? Fall, initial encounter   ? Hyperlipidemia LDL goal <70   ? Hypertension   ? Coronary Artery Disease     Past Medical History:   Diagnosis Date   ? Coronary artery disease    ? Dysphagia    ? Hyperlipidemia    ? Hypertension    ? Lumbar stenosis        Past Social History     Reviewed, and he  reports that he has never smoked. He has never used smokeless tobacco. He  reports current alcohol use of about 1.0 standard drinks of alcohol per week. He reports that he does not use drugs.    Family History     Reviewed, and includes a history of coronary artery disease in his father.  Mother had hypertension 1 of his siblings also had heart disease  His father  from prostate cancer    Medication List   Post Discharge Medication Reconciliation Status: discharge medications reconciled and changed, per note/orders (see AVS)   Current Outpatient Medications on File Prior to Visit   Medication Sig Dispense Refill   ? acetaminophen (TYLENOL) 500 MG tablet Take 2 tablets (1,000 mg total) by mouth every 8 (eight) hours.  0   ? acetaminophen-codeine (TYLENOL #3) 300-30 mg per tablet Take 1 tablet by mouth every 4 (four) hours as needed. 22 tablet 0   ? ALPRAZolam (XANAX) 0.5 MG tablet Take 1 tablet (0.5 mg total) by mouth every evening. 30 tablet 0   ? amLODIPine (NORVASC) 2.5 MG tablet Take 2.5 mg by mouth daily.      ? aspirin 81 MG EC tablet Take 81 mg by mouth daily.     ? brimonidine (ALPHAGAN) 0.2 % ophthalmic solution Administer 1 drop to the right eye 2 (two) times a day.     ? ezetimibe (ZETIA) 10 mg tablet Take 5 mg by mouth every evening.      ? latanoprost (XALATAN) 0.005 % ophthalmic solution Administer 1 drop to the right eye at bedtime.   1   ? lidocaine 4 % patch Place 1 patch on the skin daily. Remove and discard patch with 12 hours or as directed by MD.  0   ? losartan (COZAAR) 50 MG tablet Take 25 mg by mouth 2 (two) times a day.      ? metoprolol succinate (TOPROL-XL) 50 MG 24 hr tablet TAKE 1 TABLET(50 MG) BY MOUTH DAILY 90 tablet 0   ? nitroglycerin (NITROSTAT) 0.4 MG SL tablet Place 0.4 mg under the tongue every 5 (five) minutes as needed for chest pain.     ? pantoprazole (PROTONIX) 20 MG tablet Take 20 mg by mouth daily.     ? rosuvastatin (CRESTOR) 5 MG tablet Take 5 mg by mouth bedtime.     ? saliva stimulant (BIOTENE ORALBALANCE, GLYCERIN,) gel Take 1 application  "by mouth as needed.  0   ? tamsulosin (FLOMAX) 0.4 mg cap Take 0.4 mg by mouth.     ? timolol maleate (TIMOPTIC) 0.5 % ophthalmic solution INSTILL ONE DROP INTO THE RIGHT EYE QAM  12   ? vitamins  A,C,E-zinc-copper (PRESERVISION AREDS) 14,320-226-200 unit-mg-unit cap Take 1 tablet by mouth 2 (two) times a day.        No current facility-administered medications on file prior to visit.        Allergies     Allergies   Allergen Reactions   ? Duloxetine      \"Loss of appetite, disoriented\"   ? Lisinopril Cough   ? Pravastatin      Dry mouth     ? Sulfa (Sulfonamide Antibiotics)      Multiple side effects - sensitive hearing, headache, ect.    ? Xtampza Er [Oxycodone Myristate]      Chills, night sweats       Review of Systems   A comprehensive review of 14 systems was done. Pertinent findings noted here and in history of present illness. All the rest negative.  Constitutional: Negative.  Negative for fever, chills, he has activity change, appetite change and fatigue.   Became dizzy and lightheaded and had significant drop in blood pressure this morning  HENT: Negative for congestion and facial swelling.    Eyes: Negative for photophobia, redness and visual disturbance.   Respiratory: Negative for cough and chest tightness.    Cardiovascular: Negative for chest pain, palpitations and leg swelling.   Gastrointestinal: Negative for nausea, diarrhea, constipation, blood in stool and abdominal distention.   Genitourinary: Negative.  Mello has been placed  Musculoskeletal: Negative.  Reporting pain in his left hip he had difficulty walking  Skin: Negative.    Neurological: Negative for dizziness, tremors, syncope, weakness, light-headedness and headaches.   Hematological: Does not bruise/bleed easily.   Psychiatric/Behavioral: Negative.  Reports a lot of psychosocial stressors      Physical Exam     Recent Vitals 1/31/2020   Height -   Weight 155 lbs 6 oz   BSA (m2) 1.88 m2   /71   Pulse 91   Temp 98.5   Temp src - "   SpO2 -   Some recent data might be hidden   /54     Constitutional: Oriented to person, place, and time and appears well-developed.  Frail and weak  HEENT:  Normocephalic and atraumatic.  Eyes: Conjunctivae and EOM are normal. Pupils are equal, round, and reactive to light. No discharge.  No scleral icterus. Nose normal. Mouth/Throat: Oropharynx is clear and moist. No oropharyngeal exudate.    NECK: Normal range of motion. Neck supple. No JVD present. No tracheal deviation present. No thyromegaly present.   CARDIOVASCULAR: Normal rate, regular rhythm and intact distal pulses.  Exam reveals no gallop and no friction rub.  Systolic murmur present.  PULMONARY: Effort normal and breath sounds normal. No respiratory distress.No Wheezing or rales.  ABDOMEN: Soft. Bowel sounds are normal. No distension and no mass.  There is no tenderness. There is no rebound and no guarding. No HSM.  MUSCULOSKELETAL: Normal range of motion. No edema and no tenderness. Mild kyphosis, no tenderness.  Tender over the left hip over the greater trochanter  LYMPH NODES: Has no cervical, supraclavicular, axillary and groin adenopathy.   NEUROLOGICAL: Alert and oriented to person, place, and time. No cranial nerve deficit.  Normal muscle tone. Coordination normal.   GENITOURINARY: Deferred exam.  Mello noted  SKIN: Skin is warm and dry. No rash noted. No erythema. No pallor.   EXTREMITIES: No cyanosis, no clubbing, no edema. No Deformity.  PSYCHIATRIC: Normal mood, affect and behavior.      Lab Results     Results for orders placed or performed during the hospital encounter of 01/14/20   Basic Metabolic Panel   Result Value Ref Range    Sodium 141 136 - 145 mmol/L    Potassium 4.3 3.5 - 5.0 mmol/L    Chloride 105 98 - 107 mmol/L    CO2 27 22 - 31 mmol/L    Anion Gap, Calculation 9 5 - 18 mmol/L    Glucose 99 70 - 125 mg/dL    Calcium 9.1 8.5 - 10.5 mg/dL    BUN 20 8 - 28 mg/dL    Creatinine 0.82 0.70 - 1.30 mg/dL    GFR MDRD Af Amer  >60 >60 mL/min/1.73m2    GFR MDRD Non Af Amer >60 >60 mL/min/1.73m2                SARBJIT Ramírez

## 2021-06-20 NOTE — LETTER
Letter by Polly Sevilla MBBS at      Author: Polly Sevilla MBBS Service: -- Author Type: --    Filed:  Encounter Date: 1/30/2020 Status: (Other)         Patient: Zbigniew Alcocer   MR Number: [id number]   YOB: 1930   Date of Visit: 1/30/2020       [facility] Admission note      Patient: Zbigniew Alcocer  MRN: [id number]  Date of Service: 1/30/2020      [facility] [[id number]]  Reason for Visit     Chief Complaint   Patient presents with   ? Review Of Multiple Medical Conditions       Code Status     FULL CODE    Assessment     -Left hip periprosthetic fracture of the greateR trochanter  -Acute hypotension noted with persistent low blood pressures.  Patient was recently taken off amlodipine.  -Acute episode of urinary retention urinary Mello catheter has been placed with the patient  -Pain management  - HTN  - CAD status post CABG  -Mild anemia  -Generalized weakness    Plan     Patient admitted to the TCU for strengthening and rehab.    He has been given nonoperative management  Follow-up done with orthopedics and they recommended short distance walking with walker.  However he will continue with guarded weightbearing on left lower extremity and weightbearing 50% left lower extremity with walker.  No active hip abduction's.  He continues with a Mello catheter and will have patient follow-up with urology to discuss voiding trial.  Blood pressures remain in the low side   he is on a low-dose of losartan 25 mg daily and metoprolol is being held based on parameters.  He has had no repeat syncopal near syncopal episodes since then denies symptomatic issues  Discharge plan is to go home        History     Patient is a very pleasant 89 y.o. male who is admitted to TCU  Patient admitted after falling at home.  He was complaining of left hip pain.  CT revealed a mildly displaced periprosthetic left hip fracture of the greater trochanter.  He was admitted and orthopedic eval was  requested.  He has been given nonoperative management per them.  Currently discharged for TCU for PT and OT.  He is using an ice pack on his hip for pain management  Has underlying history of hypertension and has been taken off amlodipine due to low blood pressure issues.  Currently he is on a low-dose of losartan metoprolol is held frequently due to low blood pressures  He also was noted to have mild anemia however he was asymptomatic and they recommended outpatient work-up for this.  He has underlying history of coronary artery disease and has undergone CABG.  He continues on medical management he was chest pain-free  PatienT also has developed urinary retention.  He required catheterization multiple times and currently a Mello catheter has been placed with outpatient follow-up with urology   Flomax has been started      Past Medical History     Active Ambulatory (Non-Hospital) Problems    Diagnosis   ? Urinary retention   ? ACP (advance care planning)   ? Left hip pain   ? S/p left hip fracture   ? Fall, initial encounter   ? Hyperlipidemia LDL goal <70   ? Hypertension   ? Coronary Artery Disease     Past Medical History:   Diagnosis Date   ? Coronary artery disease    ? Dysphagia    ? Hyperlipidemia    ? Hypertension    ? Lumbar stenosis        Past Social History     Reviewed, and he  reports that he has never smoked. He has never used smokeless tobacco. He reports current alcohol use of about 1.0 standard drinks of alcohol per week. He reports that he does not use drugs.    Family History     Reviewed, and includes a history of coronary artery disease in his father.  Mother had hypertension 1 of his siblings also had heart disease  His father  from prostate cancer    Medication List   Post Discharge Medication Reconciliation Status: discharge medications reconciled and changed, per note/orders (see AVS)   Current Outpatient Medications on File Prior to Visit   Medication Sig Dispense Refill   ?  "acetaminophen (TYLENOL) 500 MG tablet Take 2 tablets (1,000 mg total) by mouth every 8 (eight) hours.  0   ? acetaminophen-codeine (TYLENOL #3) 300-30 mg per tablet Take 1 tablet by mouth every 4 (four) hours as needed. 22 tablet 0   ? ALPRAZolam (XANAX) 0.5 MG tablet Take 1 tablet (0.5 mg total) by mouth every evening. 30 tablet 0   ? amLODIPine (NORVASC) 2.5 MG tablet Take 2.5 mg by mouth daily.      ? aspirin 81 MG EC tablet Take 81 mg by mouth daily.     ? brimonidine (ALPHAGAN) 0.2 % ophthalmic solution Administer 1 drop to the right eye 2 (two) times a day.     ? ezetimibe (ZETIA) 10 mg tablet Take 5 mg by mouth every evening.      ? latanoprost (XALATAN) 0.005 % ophthalmic solution Administer 1 drop to the right eye at bedtime.   1   ? lidocaine 4 % patch Place 1 patch on the skin daily. Remove and discard patch with 12 hours or as directed by MD.  0   ? losartan (COZAAR) 50 MG tablet Take 25 mg by mouth 2 (two) times a day.      ? metoprolol succinate (TOPROL-XL) 50 MG 24 hr tablet TAKE 1 TABLET(50 MG) BY MOUTH DAILY 90 tablet 0   ? nitroglycerin (NITROSTAT) 0.4 MG SL tablet Place 0.4 mg under the tongue every 5 (five) minutes as needed for chest pain.     ? pantoprazole (PROTONIX) 20 MG tablet Take 20 mg by mouth daily.     ? rosuvastatin (CRESTOR) 5 MG tablet Take 5 mg by mouth bedtime.     ? saliva stimulant (BIOTENE ORALBALANCE, GLYCERIN,) gel Take 1 application by mouth as needed.  0   ? tamsulosin (FLOMAX) 0.4 mg cap Take 0.4 mg by mouth.     ? timolol maleate (TIMOPTIC) 0.5 % ophthalmic solution INSTILL ONE DROP INTO THE RIGHT EYE QAM  12   ? vitamins  A,C,E-zinc-copper (PRESERVISION AREDS) 14,320-226-200 unit-mg-unit cap Take 1 tablet by mouth 2 (two) times a day.        No current facility-administered medications on file prior to visit.        Allergies     Allergies   Allergen Reactions   ? Duloxetine      \"Loss of appetite, disoriented\"   ? Lisinopril Cough   ? Pravastatin      Dry mouth     ? " Sulfa (Sulfonamide Antibiotics)      Multiple side effects - sensitive hearing, headache, ect.    ? Xtampza Er [Oxycodone Myristate]      Chills, night sweats       Review of Systems   A comprehensive review of 14 systems was done. Pertinent findings noted here and in history of present illness. All the rest negative.  Constitutional: Negative.  Negative for fever, chills, he has activity change, appetite change and fatigue.   Became dizzy and lightheaded and had significant drop in blood pressure this morning  HENT: Negative for congestion and facial swelling.    Eyes: Negative for photophobia, redness and visual disturbance.   Respiratory: Negative for cough and chest tightness.    Cardiovascular: Negative for chest pain, palpitations and leg swelling.   Gastrointestinal: Negative for nausea, diarrhea, constipation, blood in stool and abdominal distention.   Genitourinary: Negative.  Mello has been placed  Musculoskeletal: Negative.  Reporting pain in his left hip he had difficulty walking  Skin: Negative.    Neurological: Negative for dizziness, tremors, syncope, weakness, light-headedness and headaches.   Hematological: Does not bruise/bleed easily.   Psychiatric/Behavioral: Negative.  Reports a lot of psychosocial stressors      Physical Exam     Recent Vitals 1/27/2020   Height -   Weight 155 lbs 6 oz   BSA (m2) 1.88 m2   /75   Pulse 62   Temp 98.2   Temp src -   SpO2 -   Some recent data might be hidden   BP 90/54    Constitutional: Oriented to person, place, and time and appears well-developed.  Frail and weak  HEENT:  Normocephalic and atraumatic.  Eyes: Conjunctivae and EOM are normal. Pupils are equal, round, and reactive to light. No discharge.  No scleral icterus. Nose normal. Mouth/Throat: Oropharynx is clear and moist. No oropharyngeal exudate.    NECK: Normal range of motion. Neck supple. No JVD present. No tracheal deviation present. No thyromegaly present.   CARDIOVASCULAR: Normal rate,  regular rhythm and intact distal pulses.  Exam reveals no gallop and no friction rub.  Systolic murmur present.  PULMONARY: Effort normal and breath sounds normal. No respiratory distress.No Wheezing or rales.  ABDOMEN: Soft. Bowel sounds are normal. No distension and no mass.  There is no tenderness. There is no rebound and no guarding. No HSM.  MUSCULOSKELETAL: Normal range of motion. No edema and no tenderness. Mild kyphosis, no tenderness.  Tender over the left hip over the greater trochanter  LYMPH NODES: Has no cervical, supraclavicular, axillary and groin adenopathy.   NEUROLOGICAL: Alert and oriented to person, place, and time. No cranial nerve deficit.  Normal muscle tone. Coordination normal.   GENITOURINARY: Deferred exam.  Mello noted  SKIN: Skin is warm and dry. No rash noted. No erythema. No pallor.   EXTREMITIES: No cyanosis, no clubbing, no edema. No Deformity.  PSYCHIATRIC: Normal mood, affect and behavior.      Lab Results     Results for orders placed or performed during the hospital encounter of 01/14/20   Basic Metabolic Panel   Result Value Ref Range    Sodium 141 136 - 145 mmol/L    Potassium 4.3 3.5 - 5.0 mmol/L    Chloride 105 98 - 107 mmol/L    CO2 27 22 - 31 mmol/L    Anion Gap, Calculation 9 5 - 18 mmol/L    Glucose 99 70 - 125 mg/dL    Calcium 9.1 8.5 - 10.5 mg/dL    BUN 20 8 - 28 mg/dL    Creatinine 0.82 0.70 - 1.30 mg/dL    GFR MDRD Af Amer >60 >60 mL/min/1.73m2    GFR MDRD Non Af Amer >60 >60 mL/min/1.73m2                SARBJIT Ramírez

## 2021-06-20 NOTE — LETTER
"Letter by Pam Gardner CNP at      Author: Pam Gardner CNP Service: -- Author Type: --    Filed:  Encounter Date: 2020 Status: (Other)         Patient: Zbigniew Alcocer   MR Number: [id number]   YOB: 1930   Date of Visit: 2020       [facility]      NAME:  Zbigniew Alcocer             :  1930    MRN: [id number]    CODE STATUS:  FULL CODE    FACILITY: [facility] [[id number]]    CHIEF COMPLAIN/REASON FOR VISIT:  Chief Complaint   Patient presents with   ? Review Of Multiple Medical Conditions       HISTORY OF PRESENT ILLNESS: Zbigniew Alcocer is a 89 y.o. male being seen today for review of multiple medical conditions, nursing reports PVR was greater then 500 yesterday. .  He was at [facility] from  through  status post fall with a fractured left hip.CT scan revealed a mildly displaced periprosthetic fracture on the left.  He was seen by orthopedics in acute care and they recommended conservative treatment nonoperative but he was kept in acute care because he was having gait problems as well as pain control.  His PMH includes: Hypertension, CAD, history of CABG, anemia, and insomnia.  Seen in room with wife today and we discussed TCU routine, as well as advanced care planning and medical care for seniors role in his care.  He is having pain in his left hip lidocaine patch noted to be in place with ice packs.  He does report he is having some issues with urination, reports he has a history of retention, we did have nurses check PVRs and he had 1 greater than 500 as well as 1 greater than 300.  He was unable to void when his residual was 300 so we did insert Mello catheter and I would like him to see , he does have an appointment set up for 2020.  Left hip pain has improved he has an Ortho appointment tomorrow.    Allergies   Allergen Reactions   ? Duloxetine      \"Loss of appetite, disoriented\"   ? " Lisinopril Cough   ? Pravastatin      Dry mouth     ? Sulfa (Sulfonamide Antibiotics)      Multiple side effects - sensitive hearing, headache, ect.    ? Xtampza Er [Oxycodone Myristate]      Chills, night sweats   :     Current Outpatient Medications   Medication Sig   ? acetaminophen (TYLENOL) 500 MG tablet Take 2 tablets (1,000 mg total) by mouth every 8 (eight) hours.   ? acetaminophen-codeine (TYLENOL #3) 300-30 mg per tablet Take 1 tablet by mouth every 4 (four) hours as needed.   ? ALPRAZolam (XANAX) 0.5 MG tablet Take 1 tablet (0.5 mg total) by mouth every evening.   ? amLODIPine (NORVASC) 2.5 MG tablet Take 2.5 mg by mouth daily.    ? aspirin 81 MG EC tablet Take 81 mg by mouth daily.   ? brimonidine (ALPHAGAN) 0.2 % ophthalmic solution Administer 1 drop to the right eye 2 (two) times a day.   ? cyclobenzaprine (FLEXERIL) 5 MG tablet Take 1 tablet (5 mg total) by mouth 3 (three) times a day for 10 days.   ? ezetimibe (ZETIA) 10 mg tablet Take 5 mg by mouth every evening.    ? latanoprost (XALATAN) 0.005 % ophthalmic solution Administer 1 drop to the right eye at bedtime.    ? lidocaine 4 % patch Place 1 patch on the skin daily. Remove and discard patch with 12 hours or as directed by MD.   ? losartan (COZAAR) 50 MG tablet Take 25 mg by mouth 2 (two) times a day.    ? metoprolol succinate (TOPROL-XL) 50 MG 24 hr tablet TAKE 1 TABLET(50 MG) BY MOUTH DAILY   ? nitroglycerin (NITROSTAT) 0.4 MG SL tablet Place 0.4 mg under the tongue every 5 (five) minutes as needed for chest pain.   ? pantoprazole (PROTONIX) 20 MG tablet Take 20 mg by mouth daily.   ? rosuvastatin (CRESTOR) 5 MG tablet Take 5 mg by mouth bedtime.   ? saliva stimulant (BIOTENE ORALBALANCE, GLYCERIN,) gel Take 1 application by mouth as needed.   ? tamsulosin (FLOMAX) 0.4 mg cap Take 0.4 mg by mouth.   ? timolol maleate (TIMOPTIC) 0.5 % ophthalmic solution INSTILL ONE DROP INTO THE RIGHT EYE QAM   ? vitamins  A,C,E-zinc-copper (PRESERVISION AREDS)  [id number] unit-mg-unit cap Take 1 tablet by mouth 2 (two) times a day.          REVIEW OF SYSTEMS:    Currently, no fever, chills, or rigors. Does not have any visual or hearing problems. Denies any chest pain, headaches, palpitations, lightheadedness, dizziness, shortness of breath, or cough. Appetite is good. Denies any GERD symptoms. Denies any difficulty with swallowing, nausea, or vomiting.  Denies any abdominal pain, diarrhea or constipation. Some  urinary symptoms. No insomnia. No active bleeding. No rash.       PHYSICAL EXAMINATION:  Vitals:    01/27/20 1156   BP: 146/75   Pulse: 62   Temp: 98.2  F (36.8  C)   Weight: 155 lb 6.4 oz (70.5 kg)         GENERAL: Awake, Alert, oriented x3, not in any form of acute distress, answers questions appropriately, follows simple commands, conversant  HEENT: Head is normocephalic with balding hair distribution. No evidence of trauma. Ears: No acute purulent discharge. Eyes: Conjunctivae pink with no scleral jaundice. Nose: Normal mucosa and septum. NECK: Supple with no cervical or supraclavicular lymphadenopathy. Trachea is midline.   CHEST: No tenderness or deformity, no crepitus  LUNG: Clear to auscultation with good chest expansion. There are no crackles or wheezes, normal AP diameter.  BACK: No kyphosis of the thoracic spine. Symmetric, no curvature, ROM normal, no CVA tenderness, no spinal tenderness   CVS: There is good S1  S2,  rhythm is regular.  ABDOMEN: Globular and soft, nontender to palpation, non distended, no masses, no organomegaly, good bowel sounds, no rebound or guarding, no peritoneal signs.   EXTREMITIES: Atraumatic. Full range of motion on both upper and lower extremities, there is no tenderness to palpation, no pedal edema, aged arthritic  joint swelling.  SKIN: Warm and dry, no erythema noted, no rashes or lesions.  NEUROLOGICAL: The patient is oriented to person, place and time. Strength and sensation are grossly intact. Face is  symmetric.                    LABS:    Lab Results   Component Value Date    WBC 6.8 01/15/2020    HGB 12.1 (L) 01/15/2020    HCT 36.9 (L) 01/15/2020    MCV 99 01/15/2020     01/16/2020       Results for orders placed or performed during the hospital encounter of 01/14/20   Basic Metabolic Panel   Result Value Ref Range    Sodium 141 136 - 145 mmol/L    Potassium 4.3 3.5 - 5.0 mmol/L    Chloride 105 98 - 107 mmol/L    CO2 27 22 - 31 mmol/L    Anion Gap, Calculation 9 5 - 18 mmol/L    Glucose 99 70 - 125 mg/dL    Calcium 9.1 8.5 - 10.5 mg/dL    BUN 20 8 - 28 mg/dL    Creatinine 0.82 0.70 - 1.30 mg/dL    GFR MDRD Af Amer >60 >60 mL/min/1.73m2    GFR MDRD Non Af Amer >60 >60 mL/min/1.73m2           No results found for: HGBA1C  No results found for: [id number]  No results found for: [id number]    ASSESSMENT/PLAN:  1. S/p left hip fracture    2. Urinary retention      1, Status post left hip fracture/left hip pain.:  Nonsurgical repair, conservative approach.  Patient reports it was only defined as a hairline fracture.  He is having a lot of pain in the hip using ice which is relieving pain, he also has a lidocaine patch in place see med list for full analgesic regime above.  PT/OT for ongoing rehab and therapies skilled nursing service to manage chronic medical conditions during the TCU stay.    2.  Urinary retention: Patient reports some difficulty with urination and emptying bladder.  He does have a low-grade fever as well and although we checked his UA prior to going and catheter it was negative I suggest that we check another UA to grade fever.    Electronically signed by:  Pam Gardner CNP  This progress note was completed using Dragon software and there may be grammatical errors.

## 2021-06-20 NOTE — LETTER
"Letter by Pam Gardner CNP at      Author: Pam Gardner CNP Service: -- Author Type: --    Filed:  Encounter Date: 2020 Status: Signed         Patient: Zbigniew Alcocer   MR Number: [id number]   YOB: 1930   Date of Visit: 2020       [facility]      NAME:  Zbigniew Alcocer             :  1930    MRN: [id number]    CODE STATUS:  FULL CODE    FACILITY: [facility] [[id number]]    CHIEF COMPLAIN/REASON FOR VISIT:  Chief Complaint   Patient presents with   ? Review Of Multiple Medical Conditions       HISTORY OF PRESENT ILLNESS: Zbigniew Alcocer is a 89 y.o. male being seen today for review of multiple medical conditions.  He was at [facility] from  through  status post fall with a fractured left hip.CT scan revealed a mildly displaced periprosthetic fracture on the left.  He was seen by orthopedics in acute care and they recommended conservative treatment nonoperative but he was kept in acute care because he was having gait problems as well as pain control.  His PMH includes: Hypertension, CAD, history of CABG, anemia, and insomnia.  Seen in room with wife today and we discussed TCU routine, as well as advanced care planning and medical care for seniors role in his care.  He is having pain in his left hip lidocaine patch noted to be in place with ice packs.  He does report he is having some issues with urination, reports he has a history of retention.  He is voiding.  No burning or frequency just feels the need to go comes on very quickly.    Allergies   Allergen Reactions   ? Duloxetine      \"Loss of appetite, disoriented\"   ? Lisinopril Cough   ? Pravastatin      Dry mouth     ? Sulfa (Sulfonamide Antibiotics)      Multiple side effects - sensitive hearing, headache, ect.    ? Xtampza Er [Oxycodone Myristate]      Chills, night sweats   :     Current Outpatient Medications   Medication Sig   ? " acetaminophen (TYLENOL) 500 MG tablet Take 2 tablets (1,000 mg total) by mouth every 8 (eight) hours.   ? acetaminophen-codeine (TYLENOL #3) 300-30 mg per tablet Take 1 tablet by mouth every 4 (four) hours as needed.   ? ALPRAZolam (XANAX) 0.5 MG tablet Take 1 tablet (0.5 mg total) by mouth every evening.   ? amLODIPine (NORVASC) 2.5 MG tablet Take 2.5 mg by mouth daily.    ? aspirin 81 MG EC tablet Take 81 mg by mouth daily.   ? brimonidine (ALPHAGAN) 0.2 % ophthalmic solution Administer 1 drop to the right eye 2 (two) times a day.   ? cyclobenzaprine (FLEXERIL) 5 MG tablet Take 1 tablet (5 mg total) by mouth 3 (three) times a day for 10 days.   ? ezetimibe (ZETIA) 10 mg tablet Take 5 mg by mouth every evening.    ? latanoprost (XALATAN) 0.005 % ophthalmic solution Administer 1 drop to the right eye at bedtime.    ? lidocaine 4 % patch Place 1 patch on the skin daily. Remove and discard patch with 12 hours or as directed by MD.   ? losartan (COZAAR) 50 MG tablet Take 25 mg by mouth 2 (two) times a day.    ? metoprolol succinate (TOPROL-XL) 50 MG 24 hr tablet TAKE 1 TABLET(50 MG) BY MOUTH DAILY   ? nitroglycerin (NITROSTAT) 0.4 MG SL tablet Place 0.4 mg under the tongue every 5 (five) minutes as needed for chest pain.   ? pantoprazole (PROTONIX) 20 MG tablet Take 20 mg by mouth daily.   ? rosuvastatin (CRESTOR) 5 MG tablet Take 5 mg by mouth bedtime.   ? saliva stimulant (BIOTENE ORALBALANCE, GLYCERIN,) gel Take 1 application by mouth as needed.   ? timolol maleate (TIMOPTIC) 0.5 % ophthalmic solution INSTILL ONE DROP INTO THE RIGHT EYE QAM   ? vitamins  A,C,E-zinc-copper (PRESERVISION AREDS) [phone] unit-mg-unit cap Take 1 tablet by mouth 2 (two) times a day.          REVIEW OF SYSTEMS:    Currently, no fever, chills, or rigors. Does not have any visual or hearing problems. Denies any chest pain, headaches, palpitations, lightheadedness, dizziness, shortness of breath, or cough. Appetite is good. Denies any  GERD symptoms. Denies any difficulty with swallowing, nausea, or vomiting.  Denies any abdominal pain, diarrhea or constipation. Some  urinary symptoms. No insomnia. No active bleeding. No rash.       PHYSICAL EXAMINATION:  Vitals:    01/21/20 0604   BP: 120/65   Pulse: 95   Temp: 98.6  F (37  C)   Weight: 155 lb 3.2 oz (70.4 kg)         GENERAL: Awake, Alert, oriented x3, not in any form of acute distress, answers questions appropriately, follows simple commands, conversant  HEENT: Head is normocephalic with balding hair distribution. No evidence of trauma. Ears: No acute purulent discharge. Eyes: Conjunctivae pink with no scleral jaundice. Nose: Normal mucosa and septum. NECK: Supple with no cervical or supraclavicular lymphadenopathy. Trachea is midline.   CHEST: No tenderness or deformity, no crepitus  LUNG: Clear to auscultation with good chest expansion. There are no crackles or wheezes, normal AP diameter.  BACK: No kyphosis of the thoracic spine. Symmetric, no curvature, ROM normal, no CVA tenderness, no spinal tenderness   CVS: There is good S1  S2,  rhythm is regular.  ABDOMEN: Globular and soft, nontender to palpation, non distended, no masses, no organomegaly, good bowel sounds, no rebound or guarding, no peritoneal signs.   EXTREMITIES: Atraumatic. Full range of motion on both upper and lower extremities, there is no tenderness to palpation, no pedal edema, aged arthritic  joint swelling.  SKIN: Warm and dry, no erythema noted, no rashes or lesions.  NEUROLOGICAL: The patient is oriented to person, place and time. Strength and sensation are grossly intact. Face is symmetric.                    LABS:    Lab Results   Component Value Date    WBC 6.8 01/15/2020    HGB 12.1 (L) 01/15/2020    HCT 36.9 (L) 01/15/2020    MCV 99 01/15/2020     01/16/2020       Results for orders placed or performed during the hospital encounter of 01/14/20   Basic Metabolic Panel   Result Value Ref Range    Sodium 141  136 - 145 mmol/L    Potassium 4.3 3.5 - 5.0 mmol/L    Chloride 105 98 - 107 mmol/L    CO2 27 22 - 31 mmol/L    Anion Gap, Calculation 9 5 - 18 mmol/L    Glucose 99 70 - 125 mg/dL    Calcium 9.1 8.5 - 10.5 mg/dL    BUN 20 8 - 28 mg/dL    Creatinine 0.82 0.70 - 1.30 mg/dL    GFR MDRD Af Amer >60 >60 mL/min/1.73m2    GFR MDRD Non Af Amer >60 >60 mL/min/1.73m2           No results found for: HGBA1C  No results found for: [id number]  No results found for: RBIIFKOK22    ASSESSMENT/PLAN:  1. S/p left hip fracture    2. Left hip pain    3. Urinary retention    4. ACP (advance care planning)      1/2, Status post left hip fracture/left hip pain.:  Nonsurgical repair, conservative approach.  Patient reports it was only defined as a hairline fracture.  He is having a lot of pain in the hip using ice which is relieving pain, he also has a lidocaine patch in place see med list for full analgesic regime above.  PT/OT for ongoing rehab and therapies skilled nursing service to manage chronic medical conditions during the TCU stay.    3.  Urinary retention: Patient reports some difficulty with urination and emptying bladder.   we will order PVR x3 to assure that proper emptying is occurring.    4.ACP: Greater than 16 minutes spent face-to-face with patient reviewing had advance care planning.  POLST  has been signed as per patient directions and wishes as full CODE STATUS.        Electronically signed by:  Pam Gardner CNP  This progress note was completed using Dragon software and there may be grammatical errors.

## 2021-06-20 NOTE — LETTER
"Letter by Pam Gardner CNP at      Author: Pam Gardner CNP Service: -- Author Type: --    Filed:  Encounter Date: 2020 Status: (Other)         Patient: Zbigniew Alcocer   MR Number: [id number]   YOB: 1930   Date of Visit: 2020       [facility]      NAME:  Zbigniew Alcocer             :  1930    MRN: [id number]    CODE STATUS:  FULL CODE    FACILITY: [facility] [[id number]]    CHIEF COMPLAIN/REASON FOR VISIT:  Chief Complaint   Patient presents with   ? Review Of Multiple Medical Conditions       HISTORY OF PRESENT ILLNESS: Zbigniew Alcocer is a 89 y.o. male being seen today for review of multiple medical conditions, nursing reports PVR was greater then 500 yesterday. .  He was at [facility] from  through  status post fall with a fractured left hip.CT scan revealed a mildly displaced periprosthetic fracture on the left.  He was seen by orthopedics in acute care and they recommended conservative treatment nonoperative but he was kept in acute care because he was having gait problems as well as pain control.  His PMH includes: Hypertension, CAD, history of CABG, anemia, and insomnia.  Seen in room with wife today and we discussed TCU routine, as well as advanced care planning and medical care for seniors role in his care.  He is having pain in his left hip lidocaine patch noted to be in place with ice packs.  He does report he is having some issues with urination, reports he has a history of retention, we did have nurses check PVRs and he had 1 greater than 500 as well as 1 greater than 300.  He was unable to void when his residual was 300 so we did insert Rosen catheter and I would like him to see , he does have an appointment set up for 2020. His ortho apt was completed and he has new orders, continues with rosen catheter for retention.    Allergies   Allergen Reactions   ? Duloxetine      \"Loss " "of appetite, disoriented\"   ? Lisinopril Cough   ? Pravastatin      Dry mouth     ? Sulfa (Sulfonamide Antibiotics)      Multiple side effects - sensitive hearing, headache, ect.    ? Xtampza Er [Oxycodone Myristate]      Chills, night sweats   :     Current Outpatient Medications   Medication Sig   ? acetaminophen (TYLENOL) 500 MG tablet Take 2 tablets (1,000 mg total) by mouth every 8 (eight) hours.   ? acetaminophen-codeine (TYLENOL #3) 300-30 mg per tablet Take 1 tablet by mouth every 4 (four) hours as needed.   ? ALPRAZolam (XANAX) 0.5 MG tablet Take 1 tablet (0.5 mg total) by mouth every evening.   ? amLODIPine (NORVASC) 2.5 MG tablet Take 2.5 mg by mouth daily.    ? aspirin 81 MG EC tablet Take 81 mg by mouth daily.   ? brimonidine (ALPHAGAN) 0.2 % ophthalmic solution Administer 1 drop to the right eye 2 (two) times a day.   ? ezetimibe (ZETIA) 10 mg tablet Take 5 mg by mouth every evening.    ? latanoprost (XALATAN) 0.005 % ophthalmic solution Administer 1 drop to the right eye at bedtime.    ? lidocaine 4 % patch Place 1 patch on the skin daily. Remove and discard patch with 12 hours or as directed by MD.   ? losartan (COZAAR) 50 MG tablet Take 25 mg by mouth 2 (two) times a day.    ? metoprolol succinate (TOPROL-XL) 50 MG 24 hr tablet TAKE 1 TABLET(50 MG) BY MOUTH DAILY   ? nitroglycerin (NITROSTAT) 0.4 MG SL tablet Place 0.4 mg under the tongue every 5 (five) minutes as needed for chest pain.   ? pantoprazole (PROTONIX) 20 MG tablet Take 20 mg by mouth daily.   ? rosuvastatin (CRESTOR) 5 MG tablet Take 5 mg by mouth bedtime.   ? saliva stimulant (BIOTENE ORALBALANCE, GLYCERIN,) gel Take 1 application by mouth as needed.   ? tamsulosin (FLOMAX) 0.4 mg cap Take 0.4 mg by mouth.   ? timolol maleate (TIMOPTIC) 0.5 % ophthalmic solution INSTILL ONE DROP INTO THE RIGHT EYE QAM   ? vitamins  A,C,E-zinc-copper (PRESERVISION AREDS) [id number] unit-mg-unit cap Take 1 tablet by mouth 2 (two) times a day.  "         REVIEW OF SYSTEMS:    Currently, no fever, chills, or rigors. Does not have any visual or hearing problems. Denies any chest pain, headaches, palpitations, lightheadedness, dizziness, shortness of breath, or cough. Appetite is good. Denies any GERD symptoms. Denies any difficulty with swallowing, nausea, or vomiting.  Denies any abdominal pain, diarrhea or constipation. Some  urinary symptoms. No insomnia. No active bleeding. No rash.       PHYSICAL EXAMINATION:  Vitals:    01/31/20 1735   BP: 154/71   Pulse: 91   Temp: 98.5  F (36.9  C)   Weight: 155 lb 6.4 oz (70.5 kg)         GENERAL: Awake, Alert, oriented x3, not in any form of acute distress, answers questions appropriately, follows simple commands, conversant  HEENT: Head is normocephalic with balding hair distribution. No evidence of trauma. Ears: No acute purulent discharge. Eyes: Conjunctivae pink with no scleral jaundice. Nose: Normal mucosa and septum. NECK: Supple with no cervical or supraclavicular lymphadenopathy. Trachea is midline.   CHEST: No tenderness or deformity, no crepitus  LUNG: Clear to auscultation with good chest expansion. There are no crackles or wheezes, normal AP diameter.  BACK: No kyphosis of the thoracic spine. Symmetric, no curvature, ROM normal, no CVA tenderness, no spinal tenderness   CVS: There is good S1  S2,  rhythm is regular.  ABDOMEN: Globular and soft, nontender to palpation, non distended, no masses, no organomegaly, good bowel sounds, no rebound or guarding, no peritoneal signs.   EXTREMITIES: Atraumatic. Full range of motion on both upper and lower extremities, there is no tenderness to palpation, no pedal edema, aged arthritic  joint swelling.  SKIN: Warm and dry, no erythema noted, no rashes or lesions.  NEUROLOGICAL: The patient is oriented to person, place and time. Strength and sensation are grossly intact. Face is symmetric.                    LABS:    Lab Results   Component Value Date    WBC 6.8  01/15/2020    HGB 12.1 (L) 01/15/2020    HCT 36.9 (L) 01/15/2020    MCV 99 01/15/2020     01/16/2020       Results for orders placed or performed during the hospital encounter of 01/14/20   Basic Metabolic Panel   Result Value Ref Range    Sodium 141 136 - 145 mmol/L    Potassium 4.3 3.5 - 5.0 mmol/L    Chloride 105 98 - 107 mmol/L    CO2 27 22 - 31 mmol/L    Anion Gap, Calculation 9 5 - 18 mmol/L    Glucose 99 70 - 125 mg/dL    Calcium 9.1 8.5 - 10.5 mg/dL    BUN 20 8 - 28 mg/dL    Creatinine 0.82 0.70 - 1.30 mg/dL    GFR MDRD Af Amer >60 >60 mL/min/1.73m2    GFR MDRD Non Af Amer >60 >60 mL/min/1.73m2           No results found for: HGBA1C  No results found for: [id number]  No results found for: [id number]    ASSESSMENT/PLAN:  1. S/p left hip fracture    2. Urinary retention      1, Status post left hip fracture/left hip pain.:  Nonsurgical repair, conservative approach.  Patient reports it was only defined as a hairline fracture.  He is having a lot of pain in the hip using ice which is relieving pain, he also has a lidocaine patch in place see med list for full analgesic regime above.  PT/OT for ongoing rehab and therapies skilled nursing service to manage chronic medical conditions during the TCU stay.Ortho has just increased wt bearing but has specific limitations related to abductions.    2.  Urinary retention: Mello cath in place. We have fu with urology on 2/7/20. Nursing staff assists with management.      Electronically signed by:  Pam Gardner CNP  This progress note was completed using Dragon software and there may be grammatical errors.

## 2021-06-20 NOTE — LETTER
Letter by Polly Sevilla MBBS at      Author: Polly Sevilla MBBS Service: -- Author Type: --    Filed:  Encounter Date: 1/23/2020 Status: (Other)         Patient: Zbigniew Alcocer   MR Number: [id number]   YOB: 1930   Date of Visit: 1/23/2020       [facility] Admission note      Patient: Zbigniew Alcocer  MRN: [id number]  Date of Service: 1/23/2020      [facility] [[id number]]  Reason for Visit     Chief Complaint   Patient presents with   ? Review Of Multiple Medical Conditions       Code Status     FULL CODE    Assessment     -Left hip periprosthetic fracture of the greateR trochanter  -Acute hypotension noted with persistent low blood pressures.  Patient was recently taken off amlodipine.  -Acute episode of urinary retention urinary Mello catheter has been placed with the patient  -Pain management  - HTN  - CAD status post CABG  -Mild anemia  -Generalized weakness    Plan     Patient admitted to the TCU for strengthening and rehab.    He has been given nonoperative management  Pain is adequately controlled on his regimen and he is using Tylenol 3 quite often.  Remains wheelchair-bound and poorly weightbearing.  Limited by blood pressures again had a little episode of low blood pressure this morning nursing wanted me to evaluate for him his blood pressure was 90/54.  On standing his blood pressures were low and he was getting symptomatic.  This is limiting his ability to participate in therapy.  Plan is to discontinue his losartan 25 mg twice daily.  Offer losartan 25 mg in the morning only if systolics are greater than 135.  At his age he can be given some permissive hypertension.  Also hold parameters given for systolic blood pressures less than 130 for his metoprolol 50 mg daily.  Further adjust medications based on his blood pressure trends  He does have indwelling Mello today and has a follow-up with urology due to persistent urinary retention Flomax 0.4 mg has  been added to his regimen suspect that may be adding to his low blood pressures  Continue to monitor blood pressures.  Continue with his PT OT and rehab.  Discharge plan is to look for an assisted living facility        History     Patient is a very pleasant 89 y.o. male who is admitted to TCU  Patient admitted after falling at home.  He was complaining of left hip pain.  CT revealed a mildly displaced periprosthetic left hip fracture of the greater trochanter.  He was admitted and orthopedic eval was requested.  He has been given nonoperative management per them.  Currently discharged for TCU for PT and OT.  He is using an ice pack on his hip for pain management  Has underlying history of hypertension and has been taken off amlodipine due to low blood pressure issues.  Nursing and therapy requested to eval due to persistent low blood pressures this morning blood pressure was 90/54.  He continues on 2 different antihypertensives and they are wondering if they should push it  He also was noted to have mild anemia however he was asymptomatic and they recommended outpatient work-up for this.  He has underlying history of coronary artery disease and has undergone CABG.  He continues on medical management he was chest pain-free  PatienT also has developed urinary retention.  He required catheterization multiple times and currently a Mello catheter has been placed with outpatient follow-up with urology Flomax has been started      Past Medical History     Active Ambulatory (Non-Hospital) Problems    Diagnosis   ? Urinary retention   ? ACP (advance care planning)   ? Left hip pain   ? S/p left hip fracture   ? Fall, initial encounter   ? Hyperlipidemia LDL goal <70   ? Hypertension   ? Coronary Artery Disease     Past Medical History:   Diagnosis Date   ? Coronary artery disease    ? Dysphagia    ? Hyperlipidemia    ? Hypertension    ? Lumbar stenosis        Past Social History     Reviewed, and he  reports that he has  never smoked. He has never used smokeless tobacco. He reports current alcohol use of about 1.0 standard drinks of alcohol per week. He reports that he does not use drugs.    Family History     Reviewed, and includes a history of coronary artery disease in his father.  Mother had hypertension 1 of his siblings also had heart disease  His father  from prostate cancer    Medication List   Post Discharge Medication Reconciliation Status: discharge medications reconciled and changed, per note/orders (see AVS)   Current Outpatient Medications on File Prior to Visit   Medication Sig Dispense Refill   ? acetaminophen (TYLENOL) 500 MG tablet Take 2 tablets (1,000 mg total) by mouth every 8 (eight) hours.  0   ? acetaminophen-codeine (TYLENOL #3) 300-30 mg per tablet Take 1 tablet by mouth every 4 (four) hours as needed. 22 tablet 0   ? ALPRAZolam (XANAX) 0.5 MG tablet Take 1 tablet (0.5 mg total) by mouth every evening. 30 tablet 0   ? amLODIPine (NORVASC) 2.5 MG tablet Take 2.5 mg by mouth daily.      ? aspirin 81 MG EC tablet Take 81 mg by mouth daily.     ? brimonidine (ALPHAGAN) 0.2 % ophthalmic solution Administer 1 drop to the right eye 2 (two) times a day.     ? cyclobenzaprine (FLEXERIL) 5 MG tablet Take 1 tablet (5 mg total) by mouth 3 (three) times a day for 10 days.  0   ? ezetimibe (ZETIA) 10 mg tablet Take 5 mg by mouth every evening.      ? latanoprost (XALATAN) 0.005 % ophthalmic solution Administer 1 drop to the right eye at bedtime.   1   ? lidocaine 4 % patch Place 1 patch on the skin daily. Remove and discard patch with 12 hours or as directed by MD.  0   ? losartan (COZAAR) 50 MG tablet Take 25 mg by mouth 2 (two) times a day.      ? metoprolol succinate (TOPROL-XL) 50 MG 24 hr tablet TAKE 1 TABLET(50 MG) BY MOUTH DAILY 90 tablet 0   ? nitroglycerin (NITROSTAT) 0.4 MG SL tablet Place 0.4 mg under the tongue every 5 (five) minutes as needed for chest pain.     ? pantoprazole (PROTONIX) 20 MG tablet  "Take 20 mg by mouth daily.     ? rosuvastatin (CRESTOR) 5 MG tablet Take 5 mg by mouth bedtime.     ? saliva stimulant (BIOTENE ORALBALANCE, GLYCERIN,) gel Take 1 application by mouth as needed.  0   ? tamsulosin (FLOMAX) 0.4 mg cap Take 0.4 mg by mouth.     ? timolol maleate (TIMOPTIC) 0.5 % ophthalmic solution INSTILL ONE DROP INTO THE RIGHT EYE QAM  12   ? vitamins  A,C,E-zinc-copper (PRESERVISION AREDS) 14,320-226-200 unit-mg-unit cap Take 1 tablet by mouth 2 (two) times a day.        No current facility-administered medications on file prior to visit.        Allergies     Allergies   Allergen Reactions   ? Duloxetine      \"Loss of appetite, disoriented\"   ? Lisinopril Cough   ? Pravastatin      Dry mouth     ? Sulfa (Sulfonamide Antibiotics)      Multiple side effects - sensitive hearing, headache, ect.    ? Xtampza Er [Oxycodone Myristate]      Chills, night sweats       Review of Systems   A comprehensive review of 14 systems was done. Pertinent findings noted here and in history of present illness. All the rest negative.  Constitutional: Negative.  Negative for fever, chills, he has activity change, appetite change and fatigue.   Became dizzy and lightheaded and had significant drop in blood pressure this morning  HENT: Negative for congestion and facial swelling.    Eyes: Negative for photophobia, redness and visual disturbance.   Respiratory: Negative for cough and chest tightness.    Cardiovascular: Negative for chest pain, palpitations and leg swelling.   Gastrointestinal: Negative for nausea, diarrhea, constipation, blood in stool and abdominal distention.   Genitourinary: Negative.  Mello has been placed  Musculoskeletal: Negative.  Reporting pain in his left hip he had difficulty walking  Skin: Negative.    Neurological: Negative for dizziness, tremors, syncope, weakness, light-headedness and headaches.   Hematological: Does not bruise/bleed easily.   Psychiatric/Behavioral: Negative.  Reports a lot " of psychosocial stressors      Physical Exam     Recent Vitals 1/22/2020   Height -   Weight 155 lbs 3 oz   BSA (m2) 1.88 m2   /60   Pulse 100   Temp 97.1   Temp src -   SpO2 -   Some recent data might be hidden   BP 90/54    Constitutional: Oriented to person, place, and time and appears well-developed.  Frail and weak  HEENT:  Normocephalic and atraumatic.  Eyes: Conjunctivae and EOM are normal. Pupils are equal, round, and reactive to light. No discharge.  No scleral icterus. Nose normal. Mouth/Throat: Oropharynx is clear and moist. No oropharyngeal exudate.    NECK: Normal range of motion. Neck supple. No JVD present. No tracheal deviation present. No thyromegaly present.   CARDIOVASCULAR: Normal rate, regular rhythm and intact distal pulses.  Exam reveals no gallop and no friction rub.  Systolic murmur present.  PULMONARY: Effort normal and breath sounds normal. No respiratory distress.No Wheezing or rales.  ABDOMEN: Soft. Bowel sounds are normal. No distension and no mass.  There is no tenderness. There is no rebound and no guarding. No HSM.  MUSCULOSKELETAL: Normal range of motion. No edema and no tenderness. Mild kyphosis, no tenderness.  Tender over the left hip over the greater trochanter  LYMPH NODES: Has no cervical, supraclavicular, axillary and groin adenopathy.   NEUROLOGICAL: Alert and oriented to person, place, and time. No cranial nerve deficit.  Normal muscle tone. Coordination normal.   GENITOURINARY: Deferred exam.  Mello noted  SKIN: Skin is warm and dry. No rash noted. No erythema. No pallor.   EXTREMITIES: No cyanosis, no clubbing, no edema. No Deformity.  PSYCHIATRIC: Normal mood, affect and behavior.      Lab Results     Recent Results (from the past 240 hour(s))   Basic Metabolic Panel    Collection Time: 01/14/20  3:22 PM   Result Value Ref Range    Sodium 141 136 - 145 mmol/L    Potassium 4.3 3.5 - 5.0 mmol/L    Chloride 105 98 - 107 mmol/L    CO2 27 22 - 31 mmol/L    Anion Gap,  Calculation 9 5 - 18 mmol/L    Glucose 99 70 - 125 mg/dL    Calcium 9.1 8.5 - 10.5 mg/dL    BUN 20 8 - 28 mg/dL    Creatinine 0.82 0.70 - 1.30 mg/dL    GFR MDRD Af Amer >60 >60 mL/min/1.73m2    GFR MDRD Non Af Amer >60 >60 mL/min/1.73m2   HM2 (CBC W/O DIFF)    Collection Time: 01/14/20  3:22 PM   Result Value Ref Range    WBC 12.1 (H) 4.0 - 11.0 thou/uL    RBC 4.25 (L) 4.40 - 6.20 mill/uL    Hemoglobin 13.5 (L) 14.0 - 18.0 g/dL    Hematocrit 41.9 40.0 - 54.0 %    MCV 99 80 - 100 fL    MCH 31.8 27.0 - 34.0 pg    MCHC 32.2 32.0 - 36.0 g/dL    RDW 12.2 11.0 - 14.5 %    Platelets 241 140 - 440 thou/uL    MPV 9.5 8.5 - 12.5 fL   HM1 (CBC with Diff)    Collection Time: 01/15/20 11:51 AM   Result Value Ref Range    WBC 6.8 4.0 - 11.0 thou/uL    RBC 3.73 (L) 4.40 - 6.20 mill/uL    Hemoglobin 12.1 (L) 14.0 - 18.0 g/dL    Hematocrit 36.9 (L) 40.0 - 54.0 %    MCV 99 80 - 100 fL    MCH 32.4 27.0 - 34.0 pg    MCHC 32.8 32.0 - 36.0 g/dL    RDW 12.3 11.0 - 14.5 %    Platelets 178 140 - 440 thou/uL    MPV 9.1 8.5 - 12.5 fL    Neutrophils % 76 (H) 50 - 70 %    Lymphocytes % 14 (L) 20 - 40 %    Monocytes % 9 2 - 10 %    Eosinophils % 1 0 - 6 %    Basophils % 0 0 - 2 %    Neutrophils Absolute 5.1 2.0 - 7.7 thou/uL    Lymphocytes Absolute 1.0 0.8 - 4.4 thou/uL    Monocytes Absolute 0.6 0.0 - 0.9 thou/uL    Eosinophils Absolute 0.0 0.0 - 0.4 thou/uL    Basophils Absolute 0.0 0.0 - 0.2 thou/uL   Platelet Count - every other day x 3    Collection Time: 01/16/20  8:04 AM   Result Value Ref Range    Platelets 194 140 - 440 thou/uL            Imaging Results     Xr Femur Left 2 Vws    Result Date: 1/14/2020  EXAM: XR FEMUR LEFT 2 VWS LOCATION: [facility] DATE/TIME: 1/14/2020 2:44 PM INDICATION: fall/femur pain COMPARISON: AP pelvis of the same date.     There is left total hip arthroplasty. There is lucency in the greater trochanter with an oblique linear lucency consistent with a nondisplaced fracture. The trochanter appears mildly  expanded and this could represent a pathologic fracture through either a foreign body reaction or metastasis. No dislocation no distal femoral fractures. There is arterial calcification. NOTE: ABNORMAL REPORT THE DICTATION ABOVE DESCRIBES AN ABNORMALITY FOR WHICH FOLLOW-UP IS NEEDED.     Ct Head Without Contrast    Addendum Date: 1/14/2020    Discussed the findings with Dr. Cole at at 3:07 PM. 01/14/2020.    Result Date: 1/14/2020  EXAM: CT HEAD WO CONTRAST LOCATION: [facility] DATE/TIME: 1/14/2020 2:48 PM INDICATION: Head trauma, minor (Age > 65y) fall COMPARISON: Head CT 02/20/2014 TECHNIQUE: Routine without IV contrast. Multiplanar reformats. Dose reduction techniques were used. FINDINGS: INTRACRANIAL CONTENTS: No intracranial hemorrhage. Mild diffuse cerebral parenchymal volume loss. No midline shift. The basilar cisterns are patent. Mild periventricular and scattered foci of deep white matter hypodensities involving both cerebral hemispheres. No CT evidence for an acute infarct. VISUALIZED ORBITS/SINUSES/MASTOIDS: Dislocation on the right lens, age indeterminate. No retro-orbital hemorrhage. No paranasal sinus mucosal disease. No middle ear or mastoid effusion. Extensive atherosclerotic calcification of the cavernous and supraclinoid internal carotid arteries as well as the intracranial vertebral arteries. BONES/SOFT TISSUES: No depressed skull fractures. There is cortical irregularity involving the posterior left maxillary alveolus (series 3 image 30), this is incompletely evaluated on this exam.     1.  No intracranial hemorrhage, mass lesions, hydrocephalus or CT evidence for an acute infarct. 2.  Mild diffuse cerebral parenchymal volume loss. Presumed chronic hypertensive/microvascular ischemic white matter changes. 3.  Dislocation on the right lens, age indeterminate. However it is new compared to head CT 02/20/2014. Please correlate with clinical history and exam. 4.  Cortical irregularity  involving the posterior left maxillary alveolus, this is incompletely evaluated on this exam. If there is trauma to the face, this can be better evaluated with dedicated facial bone CT.    Xr Pelvis Ap    Result Date: 1/14/2020  EXAM: XR PELVIS AP LOCATION: [facility] DATE/TIME: 1/14/2020 2:43 PM INDICATION: fall/hip pain COMPARISON: 12/27/2008.     There are bilateral hip arthroplasties. There is lucency in the left greater trochanter which may represent a foreign body reaction no displaced fracture is identified no dislocation seen on this single view. There is mild heterotopic bone adjacent to the right hip arthroplasty.    Ct Hip Without Contrast Left    Result Date: 1/14/2020  EXAM: CT HIP WO CONTRAST LEFT LOCATION: [facility] DATE/TIME: 1/14/2020 5:35 PM INDICATION: Hip replaced, periprosthetic fracture suspected Fall, left hip pain COMPARISON: 01/14/2019 x-rays. TECHNIQUE: Noncontrast. Axial, sagittal and coronal thin-section reconstruction. Dose reduction techniques were used. CONTRAST: None. FINDINGS: Left total hip arthroplasty. There is an acute mildly displaced periprosthetic fracture on the anterior margin of the proximal portion of the femoral prosthesis at the base of the left greater trochanter as seen on series 4 image 61 and series 5 image 91. Distally, this fracture extends to the lateral cortex of the proximal femoral shaft. No evidence of additional fractures. Advanced degenerative disc disease changes at the L5-S1 interspace. Evaluation of the pelvic intraperitoneal contents demonstrates diverticulosis of the sigmoid colon without evidence of diverticulitis.     Left total hip arthroplasty and an acute mildly displaced periprosthetic fracture at the base of the left greater trochanter.             SARBJIT Ramírez

## 2021-06-20 NOTE — LETTER
"Letter by Pam Gardner CNP at      Author: Pam Gardner CNP Service: -- Author Type: --    Filed:  Encounter Date: 2020 Status: (Other)         Patient: Zbigniew Alcocer   MR Number: [id number]   YOB: 1930   Date of Visit: 2020       [facility]      NAME:  Zbigniew Alcocer             :  1930    MRN: [id number]    CODE STATUS:  FULL CODE    FACILITY: [facility] [[id number]]    CHIEF COMPLAIN/REASON FOR VISIT:  Chief Complaint   Patient presents with   ? Review Of Multiple Medical Conditions       HISTORY OF PRESENT ILLNESS: Zbigniew Alcocer is a 89 y.o. male being seen today for review of multiple medical conditions, nursing reports PVR was greater then 500 yesterday. Today, they report he is not able to void, PVR is 300..  He was at [facility] from  through  status post fall with a fractured left hip.CT scan revealed a mildly displaced periprosthetic fracture on the left.  He was seen by orthopedics in acute care and they recommended conservative treatment nonoperative but he was kept in acute care because he was having gait problems as well as pain control.  His PMH includes: Hypertension, CAD, history of CABG, anemia, and insomnia.  Seen in room with wife today and we discussed TCU routine, as well as advanced care planning and medical care for seniors role in his care.  He is having pain in his left hip lidocaine patch noted to be in place with ice packs.  He does report he is having some issues with urination, reports he has a history of retention. We reviewed options of straight cathing or an indwelling cath due to retention. Due to hos hip pain with a lot of movment he opts for indwelling.     Allergies   Allergen Reactions   ? Duloxetine      \"Loss of appetite, disoriented\"   ? Lisinopril Cough   ? Pravastatin      Dry mouth     ? Sulfa (Sulfonamide Antibiotics)      Multiple side effects - " sensitive hearing, headache, ect.    ? Xtampza Er [Oxycodone Myristate]      Chills, night sweats   :     Current Outpatient Medications   Medication Sig   ? tamsulosin (FLOMAX) 0.4 mg cap Take 0.4 mg by mouth.   ? acetaminophen (TYLENOL) 500 MG tablet Take 2 tablets (1,000 mg total) by mouth every 8 (eight) hours.   ? acetaminophen-codeine (TYLENOL #3) 300-30 mg per tablet Take 1 tablet by mouth every 4 (four) hours as needed.   ? ALPRAZolam (XANAX) 0.5 MG tablet Take 1 tablet (0.5 mg total) by mouth every evening.   ? amLODIPine (NORVASC) 2.5 MG tablet Take 2.5 mg by mouth daily.    ? aspirin 81 MG EC tablet Take 81 mg by mouth daily.   ? brimonidine (ALPHAGAN) 0.2 % ophthalmic solution Administer 1 drop to the right eye 2 (two) times a day.   ? cyclobenzaprine (FLEXERIL) 5 MG tablet Take 1 tablet (5 mg total) by mouth 3 (three) times a day for 10 days.   ? ezetimibe (ZETIA) 10 mg tablet Take 5 mg by mouth every evening.    ? latanoprost (XALATAN) 0.005 % ophthalmic solution Administer 1 drop to the right eye at bedtime.    ? lidocaine 4 % patch Place 1 patch on the skin daily. Remove and discard patch with 12 hours or as directed by MD.   ? losartan (COZAAR) 50 MG tablet Take 25 mg by mouth 2 (two) times a day.    ? metoprolol succinate (TOPROL-XL) 50 MG 24 hr tablet TAKE 1 TABLET(50 MG) BY MOUTH DAILY   ? nitroglycerin (NITROSTAT) 0.4 MG SL tablet Place 0.4 mg under the tongue every 5 (five) minutes as needed for chest pain.   ? pantoprazole (PROTONIX) 20 MG tablet Take 20 mg by mouth daily.   ? rosuvastatin (CRESTOR) 5 MG tablet Take 5 mg by mouth bedtime.   ? saliva stimulant (BIOTENE ORALBALANCE, GLYCERIN,) gel Take 1 application by mouth as needed.   ? timolol maleate (TIMOPTIC) 0.5 % ophthalmic solution INSTILL ONE DROP INTO THE RIGHT EYE QAM   ? vitamins  A,C,E-zinc-copper (PRESERVISION AREDS) 14,320-226-200 unit-mg-unit cap Take 1 tablet by mouth 2 (two) times a day.          REVIEW OF  SYSTEMS:    Currently, no fever, chills, or rigors. Does not have any visual or hearing problems. Denies any chest pain, headaches, palpitations, lightheadedness, dizziness, shortness of breath, or cough. Appetite is good. Denies any GERD symptoms. Denies any difficulty with swallowing, nausea, or vomiting.  Denies any abdominal pain, diarrhea or constipation. Some  urinary symptoms. No insomnia. No active bleeding. No rash.       PHYSICAL EXAMINATION:  Vitals:    01/22/20 1200   BP: 101/60   Pulse: 100   Temp: 97.1  F (36.2  C)   Weight: 155 lb 3.2 oz (70.4 kg)         GENERAL: Awake, Alert, oriented x3, not in any form of acute distress, answers questions appropriately, follows simple commands, conversant  HEENT: Head is normocephalic with balding hair distribution. No evidence of trauma. Ears: No acute purulent discharge. Eyes: Conjunctivae pink with no scleral jaundice. Nose: Normal mucosa and septum. NECK: Supple with no cervical or supraclavicular lymphadenopathy. Trachea is midline.   CHEST: No tenderness or deformity, no crepitus  LUNG: Clear to auscultation with good chest expansion. There are no crackles or wheezes, normal AP diameter.  BACK: No kyphosis of the thoracic spine. Symmetric, no curvature, ROM normal, no CVA tenderness, no spinal tenderness   CVS: There is good S1  S2,  rhythm is regular.  ABDOMEN: Globular and soft, nontender to palpation, non distended, no masses, no organomegaly, good bowel sounds, no rebound or guarding, no peritoneal signs.   EXTREMITIES: Atraumatic. Full range of motion on both upper and lower extremities, there is no tenderness to palpation, no pedal edema, aged arthritic  joint swelling.  SKIN: Warm and dry, no erythema noted, no rashes or lesions.  NEUROLOGICAL: The patient is oriented to person, place and time. Strength and sensation are grossly intact. Face is symmetric.                    LABS:    Lab Results   Component Value Date    WBC 6.8 01/15/2020    HGB 12.1  (L) 01/15/2020    HCT 36.9 (L) 01/15/2020    MCV 99 01/15/2020     01/16/2020       Results for orders placed or performed during the hospital encounter of 01/14/20   Basic Metabolic Panel   Result Value Ref Range    Sodium 141 136 - 145 mmol/L    Potassium 4.3 3.5 - 5.0 mmol/L    Chloride 105 98 - 107 mmol/L    CO2 27 22 - 31 mmol/L    Anion Gap, Calculation 9 5 - 18 mmol/L    Glucose 99 70 - 125 mg/dL    Calcium 9.1 8.5 - 10.5 mg/dL    BUN 20 8 - 28 mg/dL    Creatinine 0.82 0.70 - 1.30 mg/dL    GFR MDRD Af Amer >60 >60 mL/min/1.73m2    GFR MDRD Non Af Amer >60 >60 mL/min/1.73m2           No results found for: HGBA1C  No results found for: [id number]  No results found for: [id number]    ASSESSMENT/PLAN:  1. S/p left hip fracture    2. Urinary retention      1, Status post left hip fracture/left hip pain.:  Nonsurgical repair, conservative approach.  Patient reports it was only defined as a hairline fracture.  He is having a lot of pain in the hip using ice which is relieving pain, he also has a lidocaine patch in place see med list for full analgesic regime above.  PT/OT for ongoing rehab and therapies skilled nursing service to manage chronic medical conditions during the TCU stay.    2.  Urinary retention: Patient reports some difficulty with urination and emptying bladder. UA initial report inemili MD ordered on 1/21, unremarkable, we will have nursing insert rosen cath and start flomax 0.4mg daily and a fu with .      Electronically signed by:  Pam Gardner CNP  This progress note was completed using Dragon software and there may be grammatical errors.

## 2021-06-25 NOTE — PROGRESS NOTES
Progress Notes by Lida Stoddard MD at 10/25/2017 11:30 AM     Author: Lida Stoddard MD Service: -- Author Type: Physician    Filed: 10/25/2017 12:22 PM Encounter Date: 10/25/2017 Status: Signed    : Lida Stoddard MD (Physician)           Click to link to [facility] HEART CARE NOTE    Thank you, Dr. Scott, for asking the [facility] team to see Mr. Zbigniew Alcocer to follow-up on coronary artery disease.    Assessment/Recommendations   Assessment:    1.  Coronary artery disease, status post coronary artery bypass grafting in 2003 with subsequent stenting of the obtuse marginal branch in 2010.  Zbigniew continues to do well with no clear anginal symptoms.  At this point, continue with medical therapy and aggressive risk factor modification.  2.  Hyperlipidemia with excellent LDL control at 55.  No change in statin therapy.  3.  Essential hypertension, well controlled      Plan:  1.  Continue current medications  2.  Follow-up in 1 year or sooner if clinically indicated       History of Present Illness    Mr. Zbigniew Alcocer is a 87 y.o. male with history of coronary artery disease, status post coronary artery bypass grafting in 2003 with subsequent stenting of an obtuse marginal branch in 2010 presents today for follow-up visit.  He reports no complaints of exertional chest discomfort or dyspnea.  His activity level is actually improved with healing of his retinal detachment.  No orthopnea, PND or lower extremity edema.  Lipids and blood pressure been well controlled on current medical therapy.    ECG (personally reviewed): No ECG today    Cardiac Imaging Studies (personally reviewed): No recent cardiac imaging     Physical Examination Review of Systems   Vitals:    10/25/17 1144   BP: 110/78   Pulse: 60   Resp: 18     Body mass index is 22.73 kg/(m^2).  Wt Readings from Last 3 Encounters:   10/25/17 163 lb (73.9 kg)   09/25/17 159 lb (72.1 kg)   11/02/16 165 lb (74.8  kg)     General Appearance:   Awake, Alert, No acute distress.   HEENT:  No scleral icterus; the mucous membranes were pink and moist.   Neck: No cervical bruits or jugular venous distention    Chest: The spine was straight. The chest was symmetric.   Lungs:   Respirations unlabored; the lungs are clear to auscultation. No wheezing   Cardiovascular:    Ocular rate and rhythm.  S1, S2 normal.  No murmur, gallop or rub   Abdomen:  No organomegaly, masses, bruits, or tenderness. Bowels sounds are present   Extremities:  No peripheral edema bilaterally   Skin: No xanthelasma. Warm, Dry.   Musculoskeletal: No tenderness.   Neurologic: Mood and affect are appropriate.    General: WNL  Eyes: WNL  Ears/Nose/Throat: WNL  Lungs: WNL  Heart: WNL  Stomach: WNL  Bladder: WNL  Muscle/Joints: WNL  Skin: WNL  Nervous System: WNL  Mental Health: WNL     Blood: WNL     Medical History  Surgical History Family History Social History   Past Medical History:   Diagnosis Date     Coronary artery disease      Hyperlipidemia      Hypertension     Past Surgical History:   Procedure Laterality Date     CORONARY ARTERY BYPASS GRAFT  2003    3 vessel     CORONARY STENT PLACEMENT  2010     [place] CABG, VEIN, SINGLE      Description: CABG (CABG);  Proc Date: 10/30/2003;  Comments: LIMA to LAD, SVG to D1, SVG to PDA    No family history of early coronary artery disease Social History     Social History     Marital status:      Spouse name: N/A     Number of children: N/A     Years of education: N/A     Occupational History     Not on file.     Social History Main Topics     Smoking status: Never Smoker     Smokeless tobacco: Not on file     Alcohol use Not on file     Drug use: Not on file     Sexual activity: Not on file     Other Topics Concern     Not on file     Social History Narrative          Medications  Allergies   Current Outpatient Prescriptions   Medication Sig Dispense Refill     acetaminophen-codeine (TYLENOL #4) 300-60 mg per  tablet Take 1 tablet by mouth every 4 (four) hours as needed for pain.       ALPRAZolam (XANAX) 0.5 MG tablet Take 0.5 mg by mouth 3 (three) times a day as needed for sleep.       amLODIPine (NORVASC) 2.5 MG tablet TAKE 1 TABLET BY MOUTH DAILY 90 tablet 2     aspirin 81 MG EC tablet Take 81 mg by mouth daily.       ezetimibe (ZETIA) 10 mg tablet Take 10 mg by mouth. 1/2 daily       metoprolol succinate (TOPROL-XL) 50 MG 24 hr tablet TAKE 1 TABLET(50 MG) BY MOUTH DAILY 90 tablet 3     nitroglycerin (NITROSTAT) 0.4 MG SL tablet Place 0.4 mg under the tongue every 5 (five) minutes as needed for chest pain.       olmesartan (BENICAR) 5 MG tablet Take 5 mg by mouth 2 times a day at 9:00am and 5:00pm.       prednisoLONE sodium phosphate (INFLAMASE FORTE) 1 % ophthalmic solution Administer 1 drop to the right eye daily.       rosuvastatin (CRESTOR) 5 MG tablet Take 5 mg by mouth bedtime.       travoprost, benzalkonium, (TRAVATAN) 0.004 % ophthalmic solution Administer 1 drop to both eyes bedtime.       clopidogrel (PLAVIX) 75 mg tablet Take 75 mg by mouth daily.       pantoprazole (PROTONIX) 20 MG tablet Take 20 mg by mouth daily.       timolol (BETIMOL) 0.5 % ophthalmic solution Administer 1 drop into the left eye daily.       No current facility-administered medications for this visit.       Allergies   Allergen Reactions     Lisinopril      Sulfa (Sulfonamide Antibiotics)          Lab Results    Chemistry/lipid CBC Cardiac Enzymes/BNP/TSH/INR   Lab Results   Component Value Date    CHOL 127 07/24/2017    HDL 56 07/24/2017    LDLCALC 55 07/24/2017    TRIG 78 07/24/2017    CREATININE 1.08 07/24/2017    BUN 14 07/24/2017    K 4.9 07/24/2017     07/24/2017     07/24/2017    CO2 30 07/24/2017    Lab Results   Component Value Date    WBC 5.3 07/21/2016    HGB 14.7 07/21/2016    HCT 44.7 07/21/2016    MCV 98 07/21/2016     07/21/2016    Lab Results   Component Value Date    TSH 1.17 07/21/2016

## 2021-06-26 NOTE — PROGRESS NOTES
Progress Notes by Lida Stoddard MD at 10/25/2018  3:10 PM     Author: Lida Stoddard MD Service: -- Author Type: Physician    Filed: 10/25/2018  5:08 PM Encounter Date: 10/25/2018 Status: Signed    : Lida Stoddard MD (Physician)           Click to link to [facility] Heart Care     [facility] HEART CARE NOTE    Thank you, Dr. Scott, for asking the [facility] Heart Care team to see Mr. Zbigniew Alcocer to follow-up on coronary artery disease and hypercholesterolemia.    Assessment/Recommendations   Assessment:    1.  Coronary artery disease, status post coronary artery bypass grafting in 2003 with subsequent stenting of an obtuse marginal branch in 2010.  Zbigniew has had a significant decline in exercise capacity over the past 2 years due to various reasons but more prominently due to spinal stenosis and leg pain.  He has not had any ischemic evaluation since his angiogram in 2010 and I did bring up the option of a pharmacologic nuclear stress study given his limited activity which could mask any problems.  He is uncertain he wants to proceed with this given his age and stress of family issues.  I suggested that he discuss it further with his primary physician who he will be seeing next week.  2.  Hypercholesterolemia, well controlled on current statin therapy as evidenced by an LDL of 57.  No change in therapy.  3.  Essential hypertension, borderline controlled.  He has had some fluctuations in blood pressure over the last several months although unclear how much of this is related to emotional stress and chronic pain.  His blood pressure looks good today and I will not make any adjustment in medication.      Plan:  1.  Continue current medications  2.  Consider pharmacologic nuclear stress study to evaluate cardiac status given his limited physical activity.       History of Present Illness    Mr. Zbigniew Alcocer is a 88 y.o. male with history of coronary artery disease, status post coronary artery  bypass grafting in 2003 followed by stenting of an obtuse marginal branch in 2010, essential hypertension and hypercholesterolemia who presents today for his yearly visit.  He tells me that over the past year, he has had a continued decline in exercise capacity due to his spinal stenosis and chronic pain in his back and legs.  He also reports a fall several weeks ago while trying to clean out a flower pot which resulted in more pain to his lower back.  They have been going with pain management over the past year and he is now on Tylenol with codeine which is causing constipation issues.  He denies any chest discomfort or shortness of breath when doing things around the house or climbing up a flight of stairs.  No orthopnea, PND or lower extremity edema.    ECG (personally reviewed): No ECG today    Cardiac Imaging Studies (personally reviewed): No recent imaging studies     Physical Examination Review of Systems   Vitals:    10/25/18 1504   BP: 108/62   Pulse: 64   Resp: 14     Body mass index is 22.45 kg/(m^2).  Wt Readings from Last 3 Encounters:   10/25/18 161 lb (73 kg)   10/25/17 163 lb (73.9 kg)   09/25/17 159 lb (72.1 kg)     General Appearance:   Awake, Alert, No acute distress.   HEENT:  No scleral icterus; the mucous membranes were pink and moist.   Neck: No cervical bruits or jugular venous distention    Chest: The spine was straight. The chest was symmetric.   Lungs:   Respirations unlabored; the lungs are clear to auscultation. No wheezing   Cardiovascular:    Regular rate and rhythm.  S1, S2 normal.  No murmur, gallop or rub   Abdomen:  No organomegaly, masses, bruits, or tenderness. Bowels sounds are present   Extremities:  Trace ankle edema bilaterally.   Skin: No xanthelasma. Warm, Dry.   Musculoskeletal: No tenderness.   Neurologic: Mood and affect are appropriate.    General: Night Sweats, Weight Loss  Eyes: Visual Distubance  Ears/Nose/Throat: WNL  Lungs: WNL  Heart: WNL  Stomach:  Constipation  Bladder: WNL  Muscle/Joints: Muscle Pain  Skin: WNL  Nervous System: Falls  Mental Health: WNL     Blood: WNL     Medical History  Surgical History Family History Social History   Past Medical History:   Diagnosis Date     Coronary artery disease      Hyperlipidemia      Hypertension     Past Surgical History:   Procedure Laterality Date     CORONARY ARTERY BYPASS GRAFT  2003    3 vessel     CORONARY STENT PLACEMENT  2010     [place] CABG, VEIN, SINGLE      Description: CABG (CABG);  Proc Date: 10/30/2003;  Comments: LIMA to LAD, SVG to D1, SVG to PDA    No family history on file. Social History     Social History     Marital status:      Spouse name: N/A     Number of children: N/A     Years of education: N/A     Occupational History     Not on file.     Social History Main Topics     Smoking status: Never Smoker     Smokeless tobacco: Not on file     Alcohol use Not on file     Drug use: Not on file     Sexual activity: Not on file     Other Topics Concern     Not on file     Social History Narrative          Medications  Allergies   Current Outpatient Prescriptions   Medication Sig Dispense Refill     acetaminophen-codeine (TYLENOL #4) 300-60 mg per tablet Take 1 tablet by mouth every 4 (four) hours as needed for pain.       ALPRAZolam (XANAX) 0.5 MG tablet Take 0.5 mg by mouth 3 (three) times a day as needed for sleep.       amLODIPine (NORVASC) 2.5 MG tablet TAKE 1 TABLET BY MOUTH DAILY 90 tablet 2     aspirin 81 MG EC tablet Take 81 mg by mouth daily.       ezetimibe (ZETIA) 10 mg tablet Take 10 mg by mouth. 1/2 daily       losartan (COZAAR) 25 MG tablet Take 25 mg by mouth 2 (two) times a day.       metoprolol succinate (TOPROL-XL) 50 MG 24 hr tablet TAKE 1 TABLET(50 MG) BY MOUTH DAILY 90 tablet 3     nitroglycerin (NITROSTAT) 0.4 MG SL tablet Place 0.4 mg under the tongue every 5 (five) minutes as needed for chest pain.       pantoprazole (PROTONIX) 20 MG tablet Take 20 mg by mouth daily.        rosuvastatin (CRESTOR) 5 MG tablet Take 5 mg by mouth bedtime.       timolol (BETIMOL) 0.5 % ophthalmic solution Administer 1 drop into the left eye daily.       vitamins  A,C,E-zinc-copper (PRESERVISION AREDS) 14,320-226-200 unit-mg-unit cap Take by mouth.       clopidogrel (PLAVIX) 75 mg tablet Take 75 mg by mouth daily.       olmesartan (BENICAR) 5 MG tablet Take 5 mg by mouth 2 times a day at 9:00am and 5:00pm.       prednisoLONE sodium phosphate (INFLAMASE FORTE) 1 % ophthalmic solution Administer 1 drop to the right eye daily.       travoprost, benzalkonium, (TRAVATAN) 0.004 % ophthalmic solution Administer 1 drop to both eyes bedtime.       No current facility-administered medications for this visit.       Allergies   Allergen Reactions     Lisinopril      Sulfa (Sulfonamide Antibiotics)          Lab Results    Chemistry/lipid CBC Cardiac Enzymes/BNP/TSH/INR   Lab Results   Component Value Date    CHOL 123 07/25/2018    HDL 47 07/25/2018    LDLCALC 57 07/25/2018    TRIG 94 07/25/2018    CREATININE 0.91 07/25/2018    BUN 18 07/25/2018    K 5.0 07/25/2018     07/25/2018     07/25/2018    CO2 28 07/25/2018    Lab Results   Component Value Date    WBC 5.3 07/21/2016    HGB 14.7 07/21/2016    HCT 44.7 07/21/2016    MCV 98 07/21/2016     07/21/2016    Lab Results   Component Value Date    TSH 1.17 07/21/2016

## 2021-06-28 NOTE — PROGRESS NOTES
Progress Notes by Pam Gardner CNP at 2020  1:28 PM     Author: Pam Gardner CNP Service: -- Author Type: Nurse Practitioner    Filed: 2020  8:33 AM Encounter Date: 2020 Status: Attested    : Pam Gardner CNP (Nurse Practitioner) Cosigner: Polly Sevilla MBBS at 2020  5:31 PM    Attestation signed by Polly Sevilla MBBS at 2020  5:31 PM    Agree with discharge plan                [facility]      NAME:  Zbigniew Alcocer             :  1930  MRN: [id number]  CODE STATUS:  FULL CODE    VISIT TYPE: DISCHARGE SUMMARY  FACILYTY: [facility] [[id number]]      HOSPITALIZATION:  [facility]  to 2020               PRIMARY CARE PROVIDER: Jeferson Scott MD    DISCHARGE DIAGNOSIS:      1. Left hip pain    2. S/p left hip fracture    3. Urinary retention         DISCHARGE MEDICATIONS:         Medication List          Accurate as of 2020 11:59 PM. If you have any questions, ask your nurse or doctor.            CONTINUE taking these medications    acetaminophen 500 MG tablet  Commonly known as:  TYLENOL  Take 2 tablets (1,000 mg total) by mouth every 8 (eight) hours.     acetaminophen-codeine 300-30 mg per tablet  Commonly known as:  TYLENOL #3  Take 1 tablet by mouth every 4 (four) hours as needed.     ALPRAZolam 0.5 MG tablet  Commonly known as:  XANAX  Take 1 tablet (0.5 mg total) by mouth every evening.     aspirin 81 MG EC tablet     brimonidine 0.2 % ophthalmic solution  Commonly known as:  ALPHAGAN     ezetimibe 10 mg tablet  Commonly known as:  ZETIA     latanoprost 0.005 % ophthalmic solution  Commonly known as:  XALATAN     metoprolol succinate 50 MG 24 hr tablet  Commonly known as:  TOPROL-XL  TAKE 1 TABLET(50 MG) BY MOUTH DAILY     nitroglycerin 0.4 MG SL tablet  Commonly known as:  NITROSTAT     pantoprazole 20 MG tablet  Commonly known as:  PROTONIX     PreserVision AREDS 14,320-226-200 unit-mg-unit Cap  Generic  drug:  vitamins  A,C,E-zinc-copper     rosuvastatin 5 MG tablet  Commonly known as:  CRESTOR     tamsulosin 0.4 mg Cap  Commonly known as:  FLOMAX     timolol maleate 0.5 % ophthalmic solution  Commonly known as:  TIMOPTIC        STOP taking these medications    amLODIPine 2.5 MG tablet  Commonly known as:  NORVASC     lidocaine 4 % patch     losartan 50 MG tablet  Commonly known as:  COZAAR     saliva stimulant gel  Commonly known as:  Biotene Oralbalance (glycerin)            HISTORY OF PRESENT ILLNESS: Zbigniew Alcocer is a 89 y.o. male is being seen for a face to face visit for an anticipated dc on 2/520. He was at [facility] from January 14 through January 17 status post fall with a fractured left hip.CT scan revealed a mildly displaced periprosthetic fracture on the left.  He was seen by orthopedics in acute care and they recommended conservative treatment nonoperative but he was kept in acute care because he was having gait problems as well as pain control.  His PMH includes: Hypertension, CAD, history of CABG, anemia, and insomnia. He has been a bit anxious and pulses increasing, nurse reported 120, my apical revealed 98. I will adjust Metaprel to give 25 mg po qam and 25 mg pm for BP coverage, low in past as well. To see  he will discuss benefits of remaining on Flomax as it may be effecting his BP and pulse as well. He is also to follow with his PCP in a week or so.    SKILLED NURSING FACILITY COURSE:  During this TCU stay, patient completed all anticipated goals of therapy.      PHYSICAL EXAMINATION:    Vitals:    02/06/20 0805   BP: 140/85   Pulse: 98   Temp: 98  F (36.7  C)   Weight: 152 lb (68.9 kg)         GENERAL: Awake, Alert, oriented x3, not in any form of acute distress, answers questions appropriately, follows simple commands, conversant  HEENT: Head is normocephalic with normal hair distribution. No evidence of trauma. Ears: No acute purulent discharge. Eyes: Conjunctivae pink with  no scleral jaundice. Nose: Normal mucosa and septum. NECK: Supple with no cervical or supraclavicular lymphadenopathy. Trachea is midline.   CHEST: No tenderness or deformity, no crepitus  LUNG: Clear to auscultation with good chest expansion. There are no crackles or wheezes, normal AP diameter.  BACK: No kyphosis of the thoracic spine. Symmetric, no curvature, ROM normal, no CVA tenderness, no spinal tenderness   CVS: There is good S1  S2, rhythm is regular.  ABDOMEN: Globular and soft, nontender to palpation, non distended, no masses, no organomegaly, good bowel sounds, no rebound or guarding, no peritoneal signs.   EXTREMITIES: Atraumatic. Full range of motion on both upper and lower extremities, there is no tenderness to palpation, trace  pedal edema,  no joint swelling.  SKIN: Warm and dry, no erythema noted, no rashes or lesions.  NEUROLOGICAL: The patient is oriented to person, place and time. Strength and sensation are grossly intact. Face is symmetric.      LABS:  All labs reviewed in the nursing home record.        DISCHARGE PLAN: I certify that this patient is under Dr. Sevilla's care, seen by the NP, and had a face-to-face encounter that meets the physician face-to-face encounter requirements.  The encounter was in whole, or part related to the primary reason for home health.  The Patient is homebound due to: Left hip fracture it is , taxing and it will take a considerable amount of effort for patient to leave the home.  She is dependent on others for transportation.  The patient is confined to her home and needs intermittent skilled nursing, PT,OT, RN, and HHA.  The patient has been under the care of Dr. Sevilla/NP and Dr. Sevilla  initiated the establishment of the plan of care.        Patient to be followed by home care for physical therapy to eval and treat for strengthening, balance, endurance, and safety with mobility, and ambulation.  Patient to be followed by home care for occupational therapy to  eval and treat for strengthening, ADL needs, adaptive equipment, and safety.  Patient to be followed by home care for nursing services for medication set up and teaching, symptom and disease processes monitoring and education.    Patient to be followed by home care for home health aid services for bathing and ADL needs.  Planned discharge.  All therapy goals have been met.  Family will assist with discharge and transportation.      Patient will follow up with PCP within 7 days after discharge for medication mangagment and appropriate lab studies.          Electronically signed by:  Pam Gardner CNP  This progress note was completed using Dragon software and there may be grammatical errors.      For documentation purposes, chart review, medication management, and discharge coordination of care was greater than 35 minutes

## 2021-06-28 NOTE — PROGRESS NOTES
Progress Notes by Lida Stoddard MD at 12/3/2019 11:30 AM     Author: Lida Stoddard MD Service: -- Author Type: Physician    Filed: 12/3/2019 12:24 PM Encounter Date: 12/3/2019 Status: Signed    : Lida Stoddard MD (Physician)           Thank you, Dr. Scott, for asking the [facility] team to see Mr. Zbigniew Alcocer to follow up on coronary artery disease and hypercholesterolemia.    Assessment/Recommendations   Assessment:    1.  Coronary artery disease, status post coronary artery bypass grafting in 2003 with subsequent stenting of an obtuse marginal branch in 2010.  Nuclear stress study performed 1 year ago negative for ischemia.  At this point would continue with current medical management and aggressive risk factor modification.  2.  Hypercholesterolemia, well controlled on current statin therapy.  3.  Essential hypertension, controlled.  Blood pressure slightly elevated today although he tells me of some low blood pressure readings he has gotten at home.  Based on that, would not make any changes in medication.      Plan:  1.  Continue current medications  2.  Follow-up with cardiology as needed       History of Present Illness    Mr. bZigniew Alcocer is a 89 y.o. male with history of coronary artery disease, status post coronary artery bypass grafting in 2003 with subsequent stenting of the obtuse marginal branch in 2010, essential hypertension and hypercholesterolemia who presents today for his yearly visit.  He continues to be bothered by his spinal stenosis and has become even more limited as a result.  He does walk the grocery store and does limited activity outdoors but spends most of the time sitting.  He denies any chest tightness, exertional dyspnea, orthopnea, PND or lower extremity edema.    ECG (personally reviewed): No ECG today    Cardiac Imaging Studies (personally reviewed): No additional imaging     Physical Examination Review of Systems   Vitals:    12/03/19 1134    BP: 144/74   Pulse: 68   Resp: 16     Body mass index is 22.18 kg/m .  Wt Readings from Last 3 Encounters:   12/03/19 159 lb (72.1 kg)   01/11/19 151 lb (68.5 kg)   10/26/18 161 lb (73 kg)     General Appearance:   Awake, Alert, No acute distress.   HEENT:  No scleral icterus; the mucous membranes were pink and moist.   Neck: No cervical bruits or jugular venous distention    Chest: The spine was straight. The chest was symmetric.   Lungs:   Respirations unlabored; the lungs are clear to auscultation. No wheezing   Cardiovascular:    Regular rate and rhythm.  S1, S2 normal.  No murmur or gallop   Abdomen:  No organomegaly, masses, bruits, or tenderness. Bowels sounds are present   Extremities:  No peripheral edema bilaterally   Skin: No xanthelasma. Warm, Dry.   Musculoskeletal: No tenderness.   Neurologic: Mood and affect are appropriate.    General: WNL  Eyes: WNL  Ears/Nose/Throat: WNL  Lungs: WNL  Heart: WNL  Stomach: WNL  Bladder: WNL  Muscle/Joints: Joint Pain, Muscle Pain  Skin: WNL  Nervous System: Falls, Loss of Balance  Mental Health: WNL     Blood: WNL     Medical History  Surgical History Family History Social History   Past Medical History:   Diagnosis Date     Coronary artery disease      Dysphagia      Hyperlipidemia      Hypertension      Lumbar stenosis     Past Surgical History:   Procedure Laterality Date     CORONARY ARTERY BYPASS GRAFT  2003    3 vessel     CORONARY STENT PLACEMENT  2010     ESOPHAGOGASTRODUODENOSCOPY N/A 1/11/2019    Procedure: ESOPHAGOGASTRODUODENOSCOPY WITH DILATION;  Surgeon: John Olguin MD;  Location: [facility];  Service: Gastroenterology     MD CABG, VEIN, SINGLE      Description: CABG (CABG);  Proc Date: 10/30/2003;  Comments: LIMA to LAD, SVG to D1, SVG to PDA     TOTAL HIP ARTHROPLASTY Bilateral     No family history on file. Social History     Socioeconomic History     Marital status:      Spouse name: Not on file     Number of children: Not on file      Years of education: Not on file     Highest education level: Not on file   Occupational History     Not on file   Social Needs     Financial resource strain: Not on file     Food insecurity:     Worry: Not on file     Inability: Not on file     Transportation needs:     Medical: Not on file     Non-medical: Not on file   Tobacco Use     Smoking status: Never Smoker     Smokeless tobacco: Never Used   Substance and Sexual Activity     Alcohol use: Yes     Alcohol/week: 1.0 standard drinks     Types: 1 Glasses of wine per week     Drug use: No     Sexual activity: Not on file   Lifestyle     Physical activity:     Days per week: Not on file     Minutes per session: Not on file     Stress: Not on file   Relationships     Social connections:     Talks on phone: Not on file     Gets together: Not on file     Attends Confucianist service: Not on file     Active member of club or organization: Not on file     Attends meetings of clubs or organizations: Not on file     Relationship status: Not on file     Intimate partner violence:     Fear of current or ex partner: Not on file     Emotionally abused: Not on file     Physically abused: Not on file     Forced sexual activity: Not on file   Other Topics Concern     Not on file   Social History Narrative     Not on file          Medications  Allergies   Current Outpatient Medications   Medication Sig Dispense Refill     acetaminophen (TYLENOL) 500 MG tablet Take 500 mg by mouth as needed.        acetaminophen-codeine (TYLENOL #3) 300-30 mg per tablet Take 1 tablet by mouth 4 (four) times a day.       ALPRAZolam (XANAX) 0.5 MG tablet Take 0.5 mg by mouth every evening.        amLODIPine (NORVASC) 2.5 MG tablet TAKE 1 TABLET BY MOUTH DAILY 90 tablet 2     amoxicillin (AMOXIL) 500 MG capsule DNC  0     aspirin 81 MG EC tablet Take 81 mg by mouth daily.       brimonidine-timolol (COMBIGAN) 0.2-0.5 % ophthalmic solution Administer 1 drop to the right eye 2 (two) times a day.         ezetimibe (ZETIA) 10 mg tablet Take 5 mg by mouth daily. 1/2 daily        latanoprost (XALATAN) 0.005 % ophthalmic solution INT 1 GTT IN RIGHT EYE QPM  1     losartan (COZAAR) 25 MG tablet Take 25 mg by mouth 2 (two) times a day.       metoprolol succinate (TOPROL-XL) 50 MG 24 hr tablet TAKE 1 TABLET(50 MG) BY MOUTH DAILY 90 tablet 0     nitroglycerin (NITROSTAT) 0.4 MG SL tablet Place 0.4 mg under the tongue every 5 (five) minutes as needed for chest pain.       pantoprazole (PROTONIX) 20 MG tablet Take 20 mg by mouth daily.       rosuvastatin (CRESTOR) 5 MG tablet Take 5 mg by mouth bedtime.       timolol maleate (TIMOPTIC) 0.5 % ophthalmic solution INSTILL ONE DROP INTO THE RIGHT EYE QAM  12     vitamins  A,C,E-zinc-copper (PRESERVISION AREDS) 14,320-226-200 unit-mg-unit cap Take 1 tablet by mouth 2 (two) times a day.        acetaminophen-codeine (TYLENOL #4) 300-60 mg per tablet Take 1 tablet by mouth every 4 (four) hours as needed for pain.       amLODIPine (NORVASC) 2.5 MG tablet Take 2.5 mg by mouth.       aspirin-calcium carbonate 81 mg-300 mg calcium(777 mg) Tab Take 81 mg by mouth.       losartan (COZAAR) 50 MG tablet Take 50 mg by mouth daily.  3     metoprolol tartrate (LOPRESSOR) 50 MG tablet Take 50 mg by mouth.       multivitamin (ONE A DAY) per tablet Take 1 tablet by mouth.       nitroglycerin (NITROSTAT) 0.4 MG SL tablet Place 0.4 mg under the tongue.       olmesartan (BENICAR) 5 MG tablet Take 5 mg by mouth 2 times a day at 9:00am and 5:00pm.       prednisoLONE sodium phosphate (INFLAMASE FORTE) 1 % ophthalmic solution Administer 1 drop to the right eye daily.       rosuvastatin (CRESTOR) 5 MG tablet Take 5 mg by mouth.       timolol (BETIMOL) 0.5 % ophthalmic solution Administer 1 drop into the left eye daily.       travoprost, benzalkonium, (TRAVATAN) 0.004 % ophthalmic solution Administer 1 drop to both eyes bedtime.       XTAMPZA ER 9 mg CSpT TK ONE T PO Q 12 H PRN P  0     No current  "facility-administered medications for this visit.       Allergies   Allergen Reactions     Duloxetine      \"Loss of appetite, disoriented\"     Lisinopril      Sulfa (Sulfonamide Antibiotics)          Lab Results    Chemistry/lipid CBC Cardiac Enzymes/BNP/TSH/INR   Lab Results   Component Value Date    CHOL 152 08/19/2019    HDL 52 08/19/2019    LDLCALC 80 08/19/2019    TRIG 102 08/19/2019    CREATININE 0.87 08/19/2019    BUN 22 08/19/2019    K 4.7 08/19/2019     08/19/2019     08/19/2019    CO2 30 08/19/2019    Lab Results   Component Value Date    WBC 7.8 12/20/2018    HGB 14.1 12/20/2018    HCT 44.9 12/20/2018     12/20/2018     12/20/2018    Lab Results   Component Value Date    TSH 1.17 07/21/2016                            "

## 2021-08-18 ENCOUNTER — LAB REQUISITION (OUTPATIENT)
Dept: LAB | Facility: CLINIC | Age: 86
End: 2021-08-18
Payer: MEDICARE

## 2021-08-18 DIAGNOSIS — E78.5 HYPERLIPIDEMIA, UNSPECIFIED: ICD-10-CM

## 2021-08-18 DIAGNOSIS — I10 ESSENTIAL (PRIMARY) HYPERTENSION: ICD-10-CM

## 2021-08-18 LAB
ALBUMIN SERPL-MCNC: 3.5 G/DL (ref 3.5–5)
ALP SERPL-CCNC: 67 U/L (ref 45–120)
ALT SERPL W P-5'-P-CCNC: 16 U/L (ref 0–45)
ANION GAP SERPL CALCULATED.3IONS-SCNC: 9 MMOL/L (ref 5–18)
AST SERPL W P-5'-P-CCNC: 23 U/L (ref 0–40)
BILIRUB SERPL-MCNC: 0.5 MG/DL (ref 0–1)
BUN SERPL-MCNC: 15 MG/DL (ref 8–28)
CALCIUM SERPL-MCNC: 9.5 MG/DL (ref 8.5–10.5)
CHLORIDE BLD-SCNC: 106 MMOL/L (ref 98–107)
CHOLEST SERPL-MCNC: 131 MG/DL
CO2 SERPL-SCNC: 26 MMOL/L (ref 22–31)
CREAT SERPL-MCNC: 1.01 MG/DL (ref 0.7–1.3)
FASTING STATUS PATIENT QL REPORTED: ABNORMAL
GFR SERPL CREATININE-BSD FRML MDRD: 65 ML/MIN/1.73M2
GLUCOSE BLD-MCNC: 100 MG/DL (ref 70–125)
HDLC SERPL-MCNC: 42 MG/DL
LDLC SERPL CALC-MCNC: 48 MG/DL
POTASSIUM BLD-SCNC: 5 MMOL/L (ref 3.5–5)
PROT SERPL-MCNC: 6.5 G/DL (ref 6–8)
SODIUM SERPL-SCNC: 141 MMOL/L (ref 136–145)
TRIGL SERPL-MCNC: 203 MG/DL

## 2021-08-18 PROCEDURE — 80061 LIPID PANEL: CPT | Performed by: FAMILY MEDICINE

## 2021-08-18 PROCEDURE — 80053 COMPREHEN METABOLIC PANEL: CPT | Mod: ORL | Performed by: FAMILY MEDICINE

## 2022-01-01 ENCOUNTER — APPOINTMENT (OUTPATIENT)
Dept: CT IMAGING | Facility: CLINIC | Age: 87
DRG: 871 | End: 2022-01-01
Attending: EMERGENCY MEDICINE
Payer: MEDICARE

## 2022-01-01 ENCOUNTER — APPOINTMENT (OUTPATIENT)
Dept: CARDIOLOGY | Facility: CLINIC | Age: 87
DRG: 871 | End: 2022-01-01
Attending: STUDENT IN AN ORGANIZED HEALTH CARE EDUCATION/TRAINING PROGRAM
Payer: MEDICARE

## 2022-01-01 ENCOUNTER — PATIENT OUTREACH (OUTPATIENT)
Dept: CARE COORDINATION | Facility: CLINIC | Age: 87
End: 2022-01-01

## 2022-01-01 ENCOUNTER — APPOINTMENT (OUTPATIENT)
Dept: SPEECH THERAPY | Facility: CLINIC | Age: 87
DRG: 871 | End: 2022-01-01
Payer: MEDICARE

## 2022-01-01 ENCOUNTER — APPOINTMENT (OUTPATIENT)
Dept: RADIOLOGY | Facility: CLINIC | Age: 87
DRG: 871 | End: 2022-01-01
Attending: EMERGENCY MEDICINE
Payer: MEDICARE

## 2022-01-01 ENCOUNTER — APPOINTMENT (OUTPATIENT)
Dept: RADIOLOGY | Facility: CLINIC | Age: 87
DRG: 871 | End: 2022-01-01
Attending: STUDENT IN AN ORGANIZED HEALTH CARE EDUCATION/TRAINING PROGRAM
Payer: MEDICARE

## 2022-01-01 ENCOUNTER — HOSPITAL ENCOUNTER (INPATIENT)
Facility: CLINIC | Age: 87
LOS: 6 days | Discharge: HOSPICE | DRG: 871 | End: 2022-11-07
Attending: EMERGENCY MEDICINE | Admitting: STUDENT IN AN ORGANIZED HEALTH CARE EDUCATION/TRAINING PROGRAM
Payer: MEDICARE

## 2022-01-01 ENCOUNTER — APPOINTMENT (OUTPATIENT)
Dept: ULTRASOUND IMAGING | Facility: CLINIC | Age: 87
DRG: 871 | End: 2022-01-01
Attending: STUDENT IN AN ORGANIZED HEALTH CARE EDUCATION/TRAINING PROGRAM
Payer: MEDICARE

## 2022-01-01 ENCOUNTER — APPOINTMENT (OUTPATIENT)
Dept: SPEECH THERAPY | Facility: CLINIC | Age: 87
DRG: 871 | End: 2022-01-01
Attending: STUDENT IN AN ORGANIZED HEALTH CARE EDUCATION/TRAINING PROGRAM
Payer: MEDICARE

## 2022-01-01 VITALS
BODY MASS INDEX: 22.01 KG/M2 | DIASTOLIC BLOOD PRESSURE: 88 MMHG | HEIGHT: 71 IN | HEART RATE: 105 BPM | SYSTOLIC BLOOD PRESSURE: 167 MMHG | TEMPERATURE: 97.6 F | RESPIRATION RATE: 20 BRPM | WEIGHT: 157.2 LBS | OXYGEN SATURATION: 92 %

## 2022-01-01 DIAGNOSIS — J18.9 PNEUMONIA OF BOTH LOWER LOBES DUE TO INFECTIOUS ORGANISM: ICD-10-CM

## 2022-01-01 DIAGNOSIS — R09.02 HYPOXIA: ICD-10-CM

## 2022-01-01 DIAGNOSIS — Z51.5 END OF LIFE CARE: Primary | ICD-10-CM

## 2022-01-01 DIAGNOSIS — R79.89 ELEVATED D-DIMER: ICD-10-CM

## 2022-01-01 LAB
ALBUMIN SERPL-MCNC: 1.1 G/DL (ref 3.5–5)
ALBUMIN SERPL-MCNC: 1.1 G/DL (ref 3.5–5)
ALBUMIN SERPL-MCNC: 1.3 G/DL (ref 3.5–5)
ALBUMIN SERPL-MCNC: 1.7 G/DL (ref 3.5–5)
ALP SERPL-CCNC: 202 U/L (ref 45–120)
ALP SERPL-CCNC: 231 U/L (ref 45–120)
ALP SERPL-CCNC: 231 U/L (ref 45–120)
ALP SERPL-CCNC: 291 U/L (ref 45–120)
ALT SERPL W P-5'-P-CCNC: 105 U/L (ref 0–45)
ALT SERPL W P-5'-P-CCNC: 148 U/L (ref 0–45)
ALT SERPL W P-5'-P-CCNC: 88 U/L (ref 0–45)
ALT SERPL W P-5'-P-CCNC: 99 U/L (ref 0–45)
ANION GAP SERPL CALCULATED.3IONS-SCNC: 10 MMOL/L (ref 5–18)
ANION GAP SERPL CALCULATED.3IONS-SCNC: 11 MMOL/L (ref 5–18)
ANION GAP SERPL CALCULATED.3IONS-SCNC: 12 MMOL/L (ref 5–18)
ANION GAP SERPL CALCULATED.3IONS-SCNC: 12 MMOL/L (ref 5–18)
ANION GAP SERPL CALCULATED.3IONS-SCNC: 8 MMOL/L (ref 5–18)
AST SERPL W P-5'-P-CCNC: 103 U/L (ref 0–40)
AST SERPL W P-5'-P-CCNC: 109 U/L (ref 0–40)
AST SERPL W P-5'-P-CCNC: 116 U/L (ref 0–40)
AST SERPL W P-5'-P-CCNC: 134 U/L (ref 0–40)
ATRIAL RATE - MUSE: 87 BPM
BACTERIA BLD CULT: NO GROWTH
BACTERIA BLD CULT: NO GROWTH
BACTERIA SPT CULT: NORMAL
BASE EXCESS BLDA CALC-SCNC: 2.4 MMOL/L
BASE EXCESS BLDV CALC-SCNC: 3.3 MMOL/L
BASOPHILS # BLD AUTO: 0 10E3/UL (ref 0–0.2)
BASOPHILS # BLD AUTO: 0 10E3/UL (ref 0–0.2)
BASOPHILS # BLD MANUAL: 0 10E3/UL (ref 0–0.2)
BASOPHILS # BLD MANUAL: 0 10E3/UL (ref 0–0.2)
BASOPHILS NFR BLD AUTO: 0 %
BASOPHILS NFR BLD AUTO: 0 %
BASOPHILS NFR BLD MANUAL: 0 %
BASOPHILS NFR BLD MANUAL: 0 %
BILIRUB DIRECT SERPL-MCNC: 0.3 MG/DL
BILIRUB SERPL-MCNC: 0.5 MG/DL (ref 0–1)
BILIRUB SERPL-MCNC: 0.5 MG/DL (ref 0–1)
BILIRUB SERPL-MCNC: 0.8 MG/DL (ref 0–1)
BILIRUB SERPL-MCNC: 0.9 MG/DL (ref 0–1)
BNP SERPL-MCNC: 186 PG/ML (ref 0–93)
BUN SERPL-MCNC: 17 MG/DL (ref 8–28)
BUN SERPL-MCNC: 22 MG/DL (ref 8–28)
BUN SERPL-MCNC: 26 MG/DL (ref 8–28)
BUN SERPL-MCNC: 26 MG/DL (ref 8–28)
BUN SERPL-MCNC: 27 MG/DL (ref 8–28)
CALCIUM SERPL-MCNC: 7.7 MG/DL (ref 8.5–10.5)
CALCIUM SERPL-MCNC: 8.2 MG/DL (ref 8.5–10.5)
CALCIUM SERPL-MCNC: 8.4 MG/DL (ref 8.5–10.5)
CALCIUM SERPL-MCNC: 8.9 MG/DL (ref 8.5–10.5)
CALCIUM SERPL-MCNC: 9.2 MG/DL (ref 8.5–10.5)
CHLORIDE BLD-SCNC: 102 MMOL/L (ref 98–107)
CHLORIDE BLD-SCNC: 106 MMOL/L (ref 98–107)
CHLORIDE BLD-SCNC: 107 MMOL/L (ref 98–107)
CHLORIDE BLD-SCNC: 111 MMOL/L (ref 98–107)
CHLORIDE BLD-SCNC: 114 MMOL/L (ref 98–107)
CO2 SERPL-SCNC: 21 MMOL/L (ref 22–31)
CO2 SERPL-SCNC: 22 MMOL/L (ref 22–31)
CO2 SERPL-SCNC: 23 MMOL/L (ref 22–31)
CO2 SERPL-SCNC: 23 MMOL/L (ref 22–31)
CO2 SERPL-SCNC: 25 MMOL/L (ref 22–31)
COHGB MFR BLD: 94.6 % (ref 95–96)
CREAT SERPL-MCNC: 0.76 MG/DL (ref 0.7–1.3)
CREAT SERPL-MCNC: 0.85 MG/DL (ref 0.7–1.3)
CREAT SERPL-MCNC: 0.85 MG/DL (ref 0.7–1.3)
CREAT SERPL-MCNC: 1.08 MG/DL (ref 0.7–1.3)
CREAT SERPL-MCNC: 1.2 MG/DL (ref 0.7–1.3)
D DIMER PPP FEU-MCNC: 3.91 UG/ML FEU (ref 0–0.5)
DIASTOLIC BLOOD PRESSURE - MUSE: 56 MMHG
EOSINOPHIL # BLD AUTO: 0 10E3/UL (ref 0–0.7)
EOSINOPHIL # BLD AUTO: 0.1 10E3/UL (ref 0–0.7)
EOSINOPHIL # BLD MANUAL: 0 10E3/UL (ref 0–0.7)
EOSINOPHIL # BLD MANUAL: 0 10E3/UL (ref 0–0.7)
EOSINOPHIL NFR BLD AUTO: 0 %
EOSINOPHIL NFR BLD AUTO: 1 %
EOSINOPHIL NFR BLD MANUAL: 0 %
EOSINOPHIL NFR BLD MANUAL: 0 %
ERYTHROCYTE [DISTWIDTH] IN BLOOD BY AUTOMATED COUNT: 13.5 % (ref 10–15)
ERYTHROCYTE [DISTWIDTH] IN BLOOD BY AUTOMATED COUNT: 13.7 % (ref 10–15)
ERYTHROCYTE [DISTWIDTH] IN BLOOD BY AUTOMATED COUNT: 13.9 % (ref 10–15)
FLUAV RNA SPEC QL NAA+PROBE: NEGATIVE
FLUBV RNA RESP QL NAA+PROBE: NEGATIVE
GFR SERPL CREATININE-BSD FRML MDRD: 57 ML/MIN/1.73M2
GFR SERPL CREATININE-BSD FRML MDRD: 64 ML/MIN/1.73M2
GFR SERPL CREATININE-BSD FRML MDRD: 82 ML/MIN/1.73M2
GFR SERPL CREATININE-BSD FRML MDRD: 82 ML/MIN/1.73M2
GFR SERPL CREATININE-BSD FRML MDRD: 84 ML/MIN/1.73M2
GLUCOSE BLD-MCNC: 135 MG/DL (ref 70–125)
GLUCOSE BLD-MCNC: 139 MG/DL (ref 70–125)
GLUCOSE BLD-MCNC: 59 MG/DL (ref 70–125)
GLUCOSE BLD-MCNC: 77 MG/DL (ref 70–125)
GLUCOSE BLD-MCNC: 90 MG/DL (ref 70–125)
GLUCOSE BLDC GLUCOMTR-MCNC: 106 MG/DL (ref 70–99)
GLUCOSE BLDC GLUCOMTR-MCNC: 129 MG/DL (ref 70–99)
GLUCOSE BLDC GLUCOMTR-MCNC: 137 MG/DL (ref 70–99)
GLUCOSE BLDC GLUCOMTR-MCNC: 139 MG/DL (ref 70–99)
GLUCOSE BLDC GLUCOMTR-MCNC: 140 MG/DL (ref 70–99)
GLUCOSE BLDC GLUCOMTR-MCNC: 143 MG/DL (ref 70–99)
GLUCOSE BLDC GLUCOMTR-MCNC: 150 MG/DL (ref 70–99)
GLUCOSE BLDC GLUCOMTR-MCNC: 159 MG/DL (ref 70–99)
GLUCOSE BLDC GLUCOMTR-MCNC: 161 MG/DL (ref 70–99)
GLUCOSE BLDC GLUCOMTR-MCNC: 79 MG/DL (ref 70–99)
GLUCOSE BLDC GLUCOMTR-MCNC: 80 MG/DL (ref 70–99)
GLUCOSE BLDC GLUCOMTR-MCNC: 81 MG/DL (ref 70–99)
GLUCOSE BLDC GLUCOMTR-MCNC: 83 MG/DL (ref 70–99)
GLUCOSE BLDC GLUCOMTR-MCNC: 94 MG/DL (ref 70–99)
GLUCOSE BLDC GLUCOMTR-MCNC: 95 MG/DL (ref 70–99)
GLUCOSE BLDC GLUCOMTR-MCNC: 97 MG/DL (ref 70–99)
GRAM STAIN RESULT: NORMAL
HCO3 BLD-SCNC: 27 MMOL/L (ref 23–29)
HCO3 BLDV-SCNC: 27 MMOL/L (ref 24–30)
HCT VFR BLD AUTO: 32.3 % (ref 40–53)
HCT VFR BLD AUTO: 33.5 % (ref 40–53)
HCT VFR BLD AUTO: 34.1 % (ref 40–53)
HCT VFR BLD AUTO: 35.6 % (ref 40–53)
HCT VFR BLD AUTO: 41.2 % (ref 40–53)
HGB BLD-MCNC: 10.1 G/DL (ref 13.3–17.7)
HGB BLD-MCNC: 10.2 G/DL (ref 13.3–17.7)
HGB BLD-MCNC: 10.5 G/DL (ref 13.3–17.7)
HGB BLD-MCNC: 10.9 G/DL (ref 13.3–17.7)
HGB BLD-MCNC: 13 G/DL (ref 13.3–17.7)
HOLD SPECIMEN: NORMAL
IMM GRANULOCYTES # BLD: 0.2 10E3/UL
IMM GRANULOCYTES # BLD: 0.3 10E3/UL
IMM GRANULOCYTES NFR BLD: 1 %
IMM GRANULOCYTES NFR BLD: 2 %
INTERPRETATION ECG - MUSE: NORMAL
LACTATE SERPL-SCNC: 1.8 MMOL/L (ref 0.7–2)
LACTATE SERPL-SCNC: 2.2 MMOL/L (ref 0.7–2)
LACTATE SERPL-SCNC: 2.3 MMOL/L (ref 0.7–2)
LACTATE SERPL-SCNC: 3 MMOL/L (ref 0.7–2)
LACTATE SERPL-SCNC: 3.6 MMOL/L (ref 0.7–2)
LIPASE SERPL-CCNC: 30 U/L (ref 0–52)
LVEF ECHO: NORMAL
LYMPHOCYTES # BLD AUTO: 0.9 10E3/UL (ref 0.8–5.3)
LYMPHOCYTES # BLD AUTO: 1 10E3/UL (ref 0.8–5.3)
LYMPHOCYTES # BLD MANUAL: 0.5 10E3/UL (ref 0.8–5.3)
LYMPHOCYTES # BLD MANUAL: 0.8 10E3/UL (ref 0.8–5.3)
LYMPHOCYTES NFR BLD AUTO: 6 %
LYMPHOCYTES NFR BLD AUTO: 8 %
LYMPHOCYTES NFR BLD MANUAL: 5 %
LYMPHOCYTES NFR BLD MANUAL: 7 %
MAGNESIUM SERPL-MCNC: 1.9 MG/DL (ref 1.8–2.6)
MAGNESIUM SERPL-MCNC: 1.9 MG/DL (ref 1.8–2.6)
MAGNESIUM SERPL-MCNC: 2 MG/DL (ref 1.8–2.6)
MAGNESIUM SERPL-MCNC: 2 MG/DL (ref 1.8–2.6)
MCH RBC QN AUTO: 30.1 PG (ref 26.5–33)
MCH RBC QN AUTO: 30.1 PG (ref 26.5–33)
MCH RBC QN AUTO: 30.2 PG (ref 26.5–33)
MCH RBC QN AUTO: 30.5 PG (ref 26.5–33)
MCH RBC QN AUTO: 30.6 PG (ref 26.5–33)
MCHC RBC AUTO-ENTMCNC: 30.4 G/DL (ref 31.5–36.5)
MCHC RBC AUTO-ENTMCNC: 30.6 G/DL (ref 31.5–36.5)
MCHC RBC AUTO-ENTMCNC: 30.8 G/DL (ref 31.5–36.5)
MCHC RBC AUTO-ENTMCNC: 31.3 G/DL (ref 31.5–36.5)
MCHC RBC AUTO-ENTMCNC: 31.6 G/DL (ref 31.5–36.5)
MCV RBC AUTO: 100 FL (ref 78–100)
MCV RBC AUTO: 96 FL (ref 78–100)
MCV RBC AUTO: 98 FL (ref 78–100)
METAMYELOCYTES # BLD MANUAL: 0.1 10E3/UL
METAMYELOCYTES NFR BLD MANUAL: 1 %
MONOCYTES # BLD AUTO: 0.5 10E3/UL (ref 0–1.3)
MONOCYTES # BLD AUTO: 0.6 10E3/UL (ref 0–1.3)
MONOCYTES # BLD MANUAL: 0.5 10E3/UL (ref 0–1.3)
MONOCYTES # BLD MANUAL: 0.7 10E3/UL (ref 0–1.3)
MONOCYTES NFR BLD AUTO: 4 %
MONOCYTES NFR BLD AUTO: 4 %
MONOCYTES NFR BLD MANUAL: 4 %
MONOCYTES NFR BLD MANUAL: 7 %
MYELOCYTES # BLD MANUAL: 0.1 10E3/UL
MYELOCYTES NFR BLD MANUAL: 1 %
NEUTROPHILS # BLD AUTO: 10.1 10E3/UL (ref 1.6–8.3)
NEUTROPHILS # BLD AUTO: 13.6 10E3/UL (ref 1.6–8.3)
NEUTROPHILS # BLD MANUAL: 10.3 10E3/UL (ref 1.6–8.3)
NEUTROPHILS # BLD MANUAL: 8.7 10E3/UL (ref 1.6–8.3)
NEUTROPHILS NFR BLD AUTO: 85 %
NEUTROPHILS NFR BLD AUTO: 89 %
NEUTROPHILS NFR BLD MANUAL: 87 %
NEUTROPHILS NFR BLD MANUAL: 88 %
NRBC # BLD AUTO: 0 10E3/UL
NRBC # BLD AUTO: 0 10E3/UL
NRBC # BLD AUTO: 0.1 10E3/UL
NRBC BLD AUTO-RTO: 0 /100
NRBC BLD AUTO-RTO: 0 /100
NRBC BLD MANUAL-RTO: 1 %
O2/TOTAL GAS SETTING VFR VENT: 55 %
OXYHGB MFR BLD: 93.7 % (ref 95–96)
OXYHGB MFR BLDV: 63.8 % (ref 70–75)
P AXIS - MUSE: 63 DEGREES
PCO2 BLD: 41 MM HG (ref 35–45)
PCO2 BLDV: 38 MM HG (ref 35–50)
PH BLD: 7.43 [PH] (ref 7.37–7.44)
PH BLDV: 7.46 [PH] (ref 7.35–7.45)
PHOSPHATE SERPL-MCNC: 4 MG/DL (ref 2.5–4.5)
PLAT MORPH BLD: ABNORMAL
PLAT MORPH BLD: ABNORMAL
PLATELET # BLD AUTO: 447 10E3/UL (ref 150–450)
PLATELET # BLD AUTO: 468 10E3/UL (ref 150–450)
PLATELET # BLD AUTO: 477 10E3/UL (ref 150–450)
PLATELET # BLD AUTO: 478 10E3/UL (ref 150–450)
PLATELET # BLD AUTO: 585 10E3/UL (ref 150–450)
PO2 BLD: 71 MM HG (ref 75–85)
PO2 BLDV: 33 MM HG (ref 25–47)
POTASSIUM BLD-SCNC: 3.3 MMOL/L (ref 3.5–5)
POTASSIUM BLD-SCNC: 3.6 MMOL/L (ref 3.5–5)
POTASSIUM BLD-SCNC: 3.7 MMOL/L (ref 3.5–5)
POTASSIUM BLD-SCNC: 3.8 MMOL/L (ref 3.5–5)
POTASSIUM BLD-SCNC: 4.1 MMOL/L (ref 3.5–5)
POTASSIUM BLD-SCNC: 4.4 MMOL/L (ref 3.5–5)
POTASSIUM BLD-SCNC: 5.2 MMOL/L (ref 3.5–5)
PR INTERVAL - MUSE: 128 MS
PROT SERPL-MCNC: 4.9 G/DL (ref 6–8)
PROT SERPL-MCNC: 5 G/DL (ref 6–8)
PROT SERPL-MCNC: 5.5 G/DL (ref 6–8)
PROT SERPL-MCNC: 6.9 G/DL (ref 6–8)
QRS DURATION - MUSE: 78 MS
QT - MUSE: 354 MS
QTC - MUSE: 425 MS
R AXIS - MUSE: -1 DEGREES
RBC # BLD AUTO: 3.3 10E6/UL (ref 4.4–5.9)
RBC # BLD AUTO: 3.34 10E6/UL (ref 4.4–5.9)
RBC # BLD AUTO: 3.49 10E6/UL (ref 4.4–5.9)
RBC # BLD AUTO: 3.62 10E6/UL (ref 4.4–5.9)
RBC # BLD AUTO: 4.31 10E6/UL (ref 4.4–5.9)
RBC MORPH BLD: ABNORMAL
RBC MORPH BLD: ABNORMAL
RSV RNA SPEC NAA+PROBE: NEGATIVE
SAO2 % BLDV: 64.6 % (ref 70–75)
SARS-COV-2 RNA RESP QL NAA+PROBE: NEGATIVE
SODIUM SERPL-SCNC: 135 MMOL/L (ref 136–145)
SODIUM SERPL-SCNC: 136 MMOL/L (ref 136–145)
SODIUM SERPL-SCNC: 143 MMOL/L (ref 136–145)
SODIUM SERPL-SCNC: 144 MMOL/L (ref 136–145)
SODIUM SERPL-SCNC: 149 MMOL/L (ref 136–145)
SYSTOLIC BLOOD PRESSURE - MUSE: 129 MMHG
T AXIS - MUSE: 79 DEGREES
TEMPERATURE: 37 DEGREES C
TROPONIN I SERPL-MCNC: 0.02 NG/ML (ref 0–0.29)
VENTRICULAR RATE- MUSE: 87 BPM
WBC # BLD AUTO: 10 10E3/UL (ref 4–11)
WBC # BLD AUTO: 11.7 10E3/UL (ref 4–11)
WBC # BLD AUTO: 11.9 10E3/UL (ref 4–11)
WBC # BLD AUTO: 14.6 10E3/UL (ref 4–11)
WBC # BLD AUTO: 15.2 10E3/UL (ref 4–11)

## 2022-01-01 PROCEDURE — 250N000013 HC RX MED GY IP 250 OP 250 PS 637: Performed by: INTERNAL MEDICINE

## 2022-01-01 PROCEDURE — 120N000013 HC R&B IMCU

## 2022-01-01 PROCEDURE — 200N000001 HC R&B ICU

## 2022-01-01 PROCEDURE — 85004 AUTOMATED DIFF WBC COUNT: CPT | Performed by: STUDENT IN AN ORGANIZED HEALTH CARE EDUCATION/TRAINING PROGRAM

## 2022-01-01 PROCEDURE — 120N000004 HC R&B MS OVERFLOW

## 2022-01-01 PROCEDURE — 250N000011 HC RX IP 250 OP 636: Performed by: STUDENT IN AN ORGANIZED HEALTH CARE EDUCATION/TRAINING PROGRAM

## 2022-01-01 PROCEDURE — 83735 ASSAY OF MAGNESIUM: CPT | Performed by: STUDENT IN AN ORGANIZED HEALTH CARE EDUCATION/TRAINING PROGRAM

## 2022-01-01 PROCEDURE — 258N000003 HC RX IP 258 OP 636: Performed by: STUDENT IN AN ORGANIZED HEALTH CARE EDUCATION/TRAINING PROGRAM

## 2022-01-01 PROCEDURE — 87040 BLOOD CULTURE FOR BACTERIA: CPT | Performed by: STUDENT IN AN ORGANIZED HEALTH CARE EDUCATION/TRAINING PROGRAM

## 2022-01-01 PROCEDURE — 258N000003 HC RX IP 258 OP 636: Performed by: INTERNAL MEDICINE

## 2022-01-01 PROCEDURE — 85014 HEMATOCRIT: CPT

## 2022-01-01 PROCEDURE — 83605 ASSAY OF LACTIC ACID: CPT | Performed by: STUDENT IN AN ORGANIZED HEALTH CARE EDUCATION/TRAINING PROGRAM

## 2022-01-01 PROCEDURE — 999N000157 HC STATISTIC RCP TIME EA 10 MIN

## 2022-01-01 PROCEDURE — 250N000013 HC RX MED GY IP 250 OP 250 PS 637: Performed by: CLINICAL NURSE SPECIALIST

## 2022-01-01 PROCEDURE — 250N000011 HC RX IP 250 OP 636: Performed by: INTERNAL MEDICINE

## 2022-01-01 PROCEDURE — 255N000002 HC RX 255 OP 636: Performed by: STUDENT IN AN ORGANIZED HEALTH CARE EDUCATION/TRAINING PROGRAM

## 2022-01-01 PROCEDURE — 272N000054 HC CANNULA HIGH FLOW, ADULT

## 2022-01-01 PROCEDURE — 36600 WITHDRAWAL OF ARTERIAL BLOOD: CPT

## 2022-01-01 PROCEDURE — 92610 EVALUATE SWALLOWING FUNCTION: CPT | Mod: GN

## 2022-01-01 PROCEDURE — 99223 1ST HOSP IP/OBS HIGH 75: CPT | Performed by: CLINICAL NURSE SPECIALIST

## 2022-01-01 PROCEDURE — 82805 BLOOD GASES W/O2 SATURATION: CPT | Performed by: INTERNAL MEDICINE

## 2022-01-01 PROCEDURE — C9113 INJ PANTOPRAZOLE SODIUM, VIA: HCPCS

## 2022-01-01 PROCEDURE — C9803 HOPD COVID-19 SPEC COLLECT: HCPCS

## 2022-01-01 PROCEDURE — 250N000009 HC RX 250: Performed by: INTERNAL MEDICINE

## 2022-01-01 PROCEDURE — 93306 TTE W/DOPPLER COMPLETE: CPT | Mod: 26 | Performed by: INTERNAL MEDICINE

## 2022-01-01 PROCEDURE — 83605 ASSAY OF LACTIC ACID: CPT | Performed by: EMERGENCY MEDICINE

## 2022-01-01 PROCEDURE — 36415 COLL VENOUS BLD VENIPUNCTURE: CPT | Performed by: INTERNAL MEDICINE

## 2022-01-01 PROCEDURE — 83880 ASSAY OF NATRIURETIC PEPTIDE: CPT | Performed by: EMERGENCY MEDICINE

## 2022-01-01 PROCEDURE — 80048 BASIC METABOLIC PNL TOTAL CA: CPT

## 2022-01-01 PROCEDURE — 85027 COMPLETE CBC AUTOMATED: CPT | Performed by: STUDENT IN AN ORGANIZED HEALTH CARE EDUCATION/TRAINING PROGRAM

## 2022-01-01 PROCEDURE — 85025 COMPLETE CBC W/AUTO DIFF WBC: CPT | Performed by: STUDENT IN AN ORGANIZED HEALTH CARE EDUCATION/TRAINING PROGRAM

## 2022-01-01 PROCEDURE — 96366 THER/PROPH/DIAG IV INF ADDON: CPT

## 2022-01-01 PROCEDURE — 250N000011 HC RX IP 250 OP 636

## 2022-01-01 PROCEDURE — 92526 ORAL FUNCTION THERAPY: CPT | Mod: GN

## 2022-01-01 PROCEDURE — 36415 COLL VENOUS BLD VENIPUNCTURE: CPT | Performed by: STUDENT IN AN ORGANIZED HEALTH CARE EDUCATION/TRAINING PROGRAM

## 2022-01-01 PROCEDURE — 82248 BILIRUBIN DIRECT: CPT | Performed by: INTERNAL MEDICINE

## 2022-01-01 PROCEDURE — 76705 ECHO EXAM OF ABDOMEN: CPT

## 2022-01-01 PROCEDURE — 82310 ASSAY OF CALCIUM: CPT | Performed by: STUDENT IN AN ORGANIZED HEALTH CARE EDUCATION/TRAINING PROGRAM

## 2022-01-01 PROCEDURE — 84100 ASSAY OF PHOSPHORUS: CPT | Performed by: STUDENT IN AN ORGANIZED HEALTH CARE EDUCATION/TRAINING PROGRAM

## 2022-01-01 PROCEDURE — 85379 FIBRIN DEGRADATION QUANT: CPT | Performed by: EMERGENCY MEDICINE

## 2022-01-01 PROCEDURE — 80053 COMPREHEN METABOLIC PANEL: CPT | Performed by: STUDENT IN AN ORGANIZED HEALTH CARE EDUCATION/TRAINING PROGRAM

## 2022-01-01 PROCEDURE — 99233 SBSQ HOSP IP/OBS HIGH 50: CPT | Performed by: STUDENT IN AN ORGANIZED HEALTH CARE EDUCATION/TRAINING PROGRAM

## 2022-01-01 PROCEDURE — 85018 HEMOGLOBIN: CPT | Performed by: EMERGENCY MEDICINE

## 2022-01-01 PROCEDURE — 999N000208 ECHOCARDIOGRAM COMPLETE

## 2022-01-01 PROCEDURE — 99291 CRITICAL CARE FIRST HOUR: CPT | Performed by: INTERNAL MEDICINE

## 2022-01-01 PROCEDURE — 250N000013 HC RX MED GY IP 250 OP 250 PS 637: Performed by: STUDENT IN AN ORGANIZED HEALTH CARE EDUCATION/TRAINING PROGRAM

## 2022-01-01 PROCEDURE — 94640 AIRWAY INHALATION TREATMENT: CPT

## 2022-01-01 PROCEDURE — 82805 BLOOD GASES W/O2 SATURATION: CPT | Performed by: EMERGENCY MEDICINE

## 2022-01-01 PROCEDURE — 84484 ASSAY OF TROPONIN QUANT: CPT | Performed by: EMERGENCY MEDICINE

## 2022-01-01 PROCEDURE — 36415 COLL VENOUS BLD VENIPUNCTURE: CPT | Performed by: EMERGENCY MEDICINE

## 2022-01-01 PROCEDURE — 36415 COLL VENOUS BLD VENIPUNCTURE: CPT

## 2022-01-01 PROCEDURE — 250N000011 HC RX IP 250 OP 636: Performed by: EMERGENCY MEDICINE

## 2022-01-01 PROCEDURE — 999N000215 HC STATISTIC HFNC ADULT NON-CPAP

## 2022-01-01 PROCEDURE — 96365 THER/PROPH/DIAG IV INF INIT: CPT | Mod: 59

## 2022-01-01 PROCEDURE — 83735 ASSAY OF MAGNESIUM: CPT | Performed by: INTERNAL MEDICINE

## 2022-01-01 PROCEDURE — 99285 EMERGENCY DEPT VISIT HI MDM: CPT | Mod: 25

## 2022-01-01 PROCEDURE — 258N000003 HC RX IP 258 OP 636: Performed by: EMERGENCY MEDICINE

## 2022-01-01 PROCEDURE — 250N000009 HC RX 250

## 2022-01-01 PROCEDURE — 99356 PR PROLONGED SERV,INPATIENT,1ST HR: CPT | Performed by: CLINICAL NURSE SPECIALIST

## 2022-01-01 PROCEDURE — 87205 SMEAR GRAM STAIN: CPT | Performed by: STUDENT IN AN ORGANIZED HEALTH CARE EDUCATION/TRAINING PROGRAM

## 2022-01-01 PROCEDURE — 84132 ASSAY OF SERUM POTASSIUM: CPT | Performed by: INTERNAL MEDICINE

## 2022-01-01 PROCEDURE — 83605 ASSAY OF LACTIC ACID: CPT | Performed by: INTERNAL MEDICINE

## 2022-01-01 PROCEDURE — 99239 HOSP IP/OBS DSCHRG MGMT >30: CPT | Performed by: STUDENT IN AN ORGANIZED HEALTH CARE EDUCATION/TRAINING PROGRAM

## 2022-01-01 PROCEDURE — 87637 SARSCOV2&INF A&B&RSV AMP PRB: CPT | Performed by: EMERGENCY MEDICINE

## 2022-01-01 PROCEDURE — 80053 COMPREHEN METABOLIC PANEL: CPT | Performed by: EMERGENCY MEDICINE

## 2022-01-01 PROCEDURE — 92526 ORAL FUNCTION THERAPY: CPT | Mod: GN | Performed by: SPEECH-LANGUAGE PATHOLOGIST

## 2022-01-01 PROCEDURE — 94640 AIRWAY INHALATION TREATMENT: CPT | Mod: 76

## 2022-01-01 PROCEDURE — 71045 X-RAY EXAM CHEST 1 VIEW: CPT

## 2022-01-01 PROCEDURE — 85007 BL SMEAR W/DIFF WBC COUNT: CPT | Performed by: EMERGENCY MEDICINE

## 2022-01-01 PROCEDURE — 96361 HYDRATE IV INFUSION ADD-ON: CPT

## 2022-01-01 PROCEDURE — 85007 BL SMEAR W/DIFF WBC COUNT: CPT | Performed by: STUDENT IN AN ORGANIZED HEALTH CARE EDUCATION/TRAINING PROGRAM

## 2022-01-01 PROCEDURE — 93005 ELECTROCARDIOGRAM TRACING: CPT | Performed by: EMERGENCY MEDICINE

## 2022-01-01 PROCEDURE — 96372 THER/PROPH/DIAG INJ SC/IM: CPT | Mod: 59

## 2022-01-01 PROCEDURE — 96375 TX/PRO/DX INJ NEW DRUG ADDON: CPT

## 2022-01-01 PROCEDURE — 99223 1ST HOSP IP/OBS HIGH 75: CPT | Mod: AI | Performed by: STUDENT IN AN ORGANIZED HEALTH CARE EDUCATION/TRAINING PROGRAM

## 2022-01-01 PROCEDURE — 82040 ASSAY OF SERUM ALBUMIN: CPT | Performed by: EMERGENCY MEDICINE

## 2022-01-01 PROCEDURE — 258N000001 HC RX 258: Performed by: INTERNAL MEDICINE

## 2022-01-01 PROCEDURE — 74230 X-RAY XM SWLNG FUNCJ C+: CPT

## 2022-01-01 PROCEDURE — 71275 CT ANGIOGRAPHY CHEST: CPT | Mod: MA

## 2022-01-01 PROCEDURE — 92611 MOTION FLUOROSCOPY/SWALLOW: CPT | Mod: GN

## 2022-01-01 PROCEDURE — 83690 ASSAY OF LIPASE: CPT | Performed by: EMERGENCY MEDICINE

## 2022-01-01 PROCEDURE — 250N000013 HC RX MED GY IP 250 OP 250 PS 637: Performed by: HOSPITALIST

## 2022-01-01 PROCEDURE — C9113 INJ PANTOPRAZOLE SODIUM, VIA: HCPCS | Performed by: INTERNAL MEDICINE

## 2022-01-01 PROCEDURE — 250N000009 HC RX 250: Performed by: HOSPITALIST

## 2022-01-01 RX ORDER — BISACODYL 10 MG
10 SUPPOSITORY, RECTAL RECTAL DAILY PRN
Qty: 20 SUPPOSITORY | Refills: 0 | Status: SHIPPED | OUTPATIENT
Start: 2022-01-01

## 2022-01-01 RX ORDER — LIDOCAINE 4 G/G
1 PATCH TOPICAL
Status: DISCONTINUED | OUTPATIENT
Start: 2022-01-01 | End: 2022-01-01 | Stop reason: HOSPADM

## 2022-01-01 RX ORDER — ALPRAZOLAM 0.25 MG
0.5 TABLET ORAL EVERY EVENING
Status: DISCONTINUED | OUTPATIENT
Start: 2022-01-01 | End: 2022-01-01 | Stop reason: HOSPADM

## 2022-01-01 RX ORDER — BISACODYL 10 MG
10 SUPPOSITORY, RECTAL RECTAL DAILY PRN
Status: DISCONTINUED | OUTPATIENT
Start: 2022-01-01 | End: 2022-01-01 | Stop reason: HOSPADM

## 2022-01-01 RX ORDER — CARBOXYMETHYLCELLULOSE SODIUM 5 MG/ML
1 SOLUTION/ DROPS OPHTHALMIC 2 TIMES DAILY
Status: ON HOLD | COMMUNITY
End: 2022-01-01

## 2022-01-01 RX ORDER — METOPROLOL SUCCINATE 25 MG/1
25 TABLET, EXTENDED RELEASE ORAL 2 TIMES DAILY
Status: DISCONTINUED | OUTPATIENT
Start: 2022-01-01 | End: 2022-01-01 | Stop reason: HOSPADM

## 2022-01-01 RX ORDER — SALIVA STIMULANT COMB. NO.3
2 SPRAY, NON-AEROSOL (ML) MUCOUS MEMBRANE 4 TIMES DAILY
Status: DISCONTINUED | OUTPATIENT
Start: 2022-01-01 | End: 2022-01-01

## 2022-01-01 RX ORDER — FENTANYL CITRATE 50 UG/ML
12.5 INJECTION, SOLUTION INTRAMUSCULAR; INTRAVENOUS EVERY 4 HOURS PRN
Qty: 10 ML | Refills: 0
Start: 2022-01-01 | End: 2022-01-01

## 2022-01-01 RX ORDER — SALIVA STIMULANT COMB. NO.3
1 SPRAY, NON-AEROSOL (ML) MUCOUS MEMBRANE 4 TIMES DAILY PRN
Status: DISCONTINUED | OUTPATIENT
Start: 2022-01-01 | End: 2022-01-01 | Stop reason: RX

## 2022-01-01 RX ORDER — NALOXONE HYDROCHLORIDE 0.4 MG/ML
0.4 INJECTION, SOLUTION INTRAMUSCULAR; INTRAVENOUS; SUBCUTANEOUS
Status: DISCONTINUED | OUTPATIENT
Start: 2022-01-01 | End: 2022-01-01 | Stop reason: HOSPADM

## 2022-01-01 RX ORDER — MORPHINE SULFATE 10 MG/5ML
2 SOLUTION ORAL
Status: DISCONTINUED | OUTPATIENT
Start: 2022-01-01 | End: 2022-01-01 | Stop reason: HOSPADM

## 2022-01-01 RX ORDER — TIMOLOL MALEATE 5 MG/ML
1 SOLUTION/ DROPS OPHTHALMIC EVERY MORNING
Status: DISCONTINUED | OUTPATIENT
Start: 2022-01-01 | End: 2022-01-01

## 2022-01-01 RX ORDER — ATROPINE SULFATE 10 MG/ML
2 SOLUTION/ DROPS OPHTHALMIC
Status: DISCONTINUED | OUTPATIENT
Start: 2022-01-01 | End: 2022-01-01 | Stop reason: HOSPADM

## 2022-01-01 RX ORDER — METOPROLOL SUCCINATE 50 MG/1
25 TABLET, EXTENDED RELEASE ORAL 2 TIMES DAILY
Status: ON HOLD | COMMUNITY
End: 2022-01-01

## 2022-01-01 RX ORDER — DEXTROSE MONOHYDRATE 25 G/50ML
25-50 INJECTION, SOLUTION INTRAVENOUS
Status: DISCONTINUED | OUTPATIENT
Start: 2022-01-01 | End: 2022-01-01 | Stop reason: HOSPADM

## 2022-01-01 RX ORDER — NALOXONE HYDROCHLORIDE 0.4 MG/ML
0.2 INJECTION, SOLUTION INTRAMUSCULAR; INTRAVENOUS; SUBCUTANEOUS
Status: DISCONTINUED | OUTPATIENT
Start: 2022-01-01 | End: 2022-01-01 | Stop reason: HOSPADM

## 2022-01-01 RX ORDER — DIPHENHYDRAMINE HCL 25 MG
25 CAPSULE ORAL
Status: ON HOLD | COMMUNITY
End: 2022-01-01

## 2022-01-01 RX ORDER — LATANOPROST 50 UG/ML
1 SOLUTION/ DROPS OPHTHALMIC AT BEDTIME
Status: DISCONTINUED | OUTPATIENT
Start: 2022-01-01 | End: 2022-01-01

## 2022-01-01 RX ORDER — MAGNESIUM SULFATE HEPTAHYDRATE 40 MG/ML
2 INJECTION, SOLUTION INTRAVENOUS ONCE
Status: COMPLETED | OUTPATIENT
Start: 2022-01-01 | End: 2022-01-01

## 2022-01-01 RX ORDER — MORPHINE SULFATE 2 MG/ML
1-2 INJECTION, SOLUTION INTRAMUSCULAR; INTRAVENOUS
Status: DISCONTINUED | OUTPATIENT
Start: 2022-01-01 | End: 2022-01-01 | Stop reason: HOSPADM

## 2022-01-01 RX ORDER — PIPERACILLIN SODIUM, TAZOBACTAM SODIUM 3; .375 G/15ML; G/15ML
3.38 INJECTION, POWDER, LYOPHILIZED, FOR SOLUTION INTRAVENOUS EVERY 8 HOURS
Status: DISCONTINUED | OUTPATIENT
Start: 2022-01-01 | End: 2022-01-01

## 2022-01-01 RX ORDER — POTASSIUM CHLORIDE 7.45 MG/ML
10 INJECTION INTRAVENOUS
Status: COMPLETED | OUTPATIENT
Start: 2022-01-01 | End: 2022-01-01

## 2022-01-01 RX ORDER — ENOXAPARIN SODIUM 100 MG/ML
40 INJECTION SUBCUTANEOUS EVERY 24 HOURS
Status: DISCONTINUED | OUTPATIENT
Start: 2022-01-01 | End: 2022-01-01

## 2022-01-01 RX ORDER — AMLODIPINE BESYLATE 2.5 MG/1
2.5 TABLET ORAL DAILY
Status: ON HOLD | COMMUNITY
End: 2022-01-01

## 2022-01-01 RX ORDER — ACETYLCYSTEINE 200 MG/ML
2 SOLUTION ORAL; RESPIRATORY (INHALATION)
Status: DISCONTINUED | OUTPATIENT
Start: 2022-01-01 | End: 2022-01-01

## 2022-01-01 RX ORDER — BARIUM SULFATE 400 MG/ML
SUSPENSION ORAL ONCE
Status: COMPLETED | OUTPATIENT
Start: 2022-01-01 | End: 2022-01-01

## 2022-01-01 RX ORDER — AMOXICILLIN 500 MG/1
2000 CAPSULE ORAL DAILY PRN
Status: ON HOLD | COMMUNITY
End: 2022-01-01

## 2022-01-01 RX ORDER — PANTOPRAZOLE SODIUM 20 MG/1
20 TABLET, DELAYED RELEASE ORAL DAILY
Status: DISCONTINUED | OUTPATIENT
Start: 2022-01-01 | End: 2022-01-01 | Stop reason: HOSPADM

## 2022-01-01 RX ORDER — ASPIRIN 81 MG/1
81 TABLET ORAL DAILY
Status: DISCONTINUED | OUTPATIENT
Start: 2022-01-01 | End: 2022-01-01 | Stop reason: HOSPADM

## 2022-01-01 RX ORDER — MORPHINE SULFATE 10 MG/5ML
5 SOLUTION ORAL EVERY 4 HOURS PRN
Qty: 20 ML | Refills: 0 | Status: SHIPPED | OUTPATIENT
Start: 2022-01-01 | End: 2022-01-01

## 2022-01-01 RX ORDER — METOPROLOL TARTRATE 1 MG/ML
2.5 INJECTION, SOLUTION INTRAVENOUS EVERY 6 HOURS
Status: DISCONTINUED | OUTPATIENT
Start: 2022-01-01 | End: 2022-01-01

## 2022-01-01 RX ORDER — CARBOXYMETHYLCELLULOSE SODIUM 5 MG/ML
1-2 SOLUTION/ DROPS OPHTHALMIC
Status: DISCONTINUED | OUTPATIENT
Start: 2022-01-01 | End: 2022-01-01 | Stop reason: HOSPADM

## 2022-01-01 RX ORDER — BRIMONIDINE TARTRATE 2 MG/ML
1 SOLUTION/ DROPS OPHTHALMIC 2 TIMES DAILY
Status: DISCONTINUED | OUTPATIENT
Start: 2022-01-01 | End: 2022-01-01

## 2022-01-01 RX ORDER — LORAZEPAM 2 MG/ML
1 CONCENTRATE ORAL EVERY 4 HOURS PRN
Status: DISCONTINUED | OUTPATIENT
Start: 2022-01-01 | End: 2022-01-01 | Stop reason: HOSPADM

## 2022-01-01 RX ORDER — CARBOXYMETHYLCELLULOSE SODIUM 5 MG/ML
1 SOLUTION/ DROPS OPHTHALMIC 2 TIMES DAILY
Status: DISCONTINUED | OUTPATIENT
Start: 2022-01-01 | End: 2022-01-01

## 2022-01-01 RX ORDER — POTASSIUM CHLORIDE 1500 MG/1
40 TABLET, EXTENDED RELEASE ORAL ONCE
Status: DISCONTINUED | OUTPATIENT
Start: 2022-01-01 | End: 2022-01-01

## 2022-01-01 RX ORDER — PIPERACILLIN SODIUM, TAZOBACTAM SODIUM 4; .5 G/20ML; G/20ML
4.5 INJECTION, POWDER, LYOPHILIZED, FOR SOLUTION INTRAVENOUS EVERY 6 HOURS
Status: DISCONTINUED | OUTPATIENT
Start: 2022-01-01 | End: 2022-01-01 | Stop reason: CLARIF

## 2022-01-01 RX ORDER — FENTANYL CITRATE 50 UG/ML
12.5 INJECTION, SOLUTION INTRAMUSCULAR; INTRAVENOUS EVERY 4 HOURS PRN
Status: DISCONTINUED | OUTPATIENT
Start: 2022-01-01 | End: 2022-01-01 | Stop reason: HOSPADM

## 2022-01-01 RX ORDER — ONDANSETRON 4 MG/1
4 TABLET, ORALLY DISINTEGRATING ORAL EVERY 6 HOURS PRN
Status: DISCONTINUED | OUTPATIENT
Start: 2022-01-01 | End: 2022-01-01 | Stop reason: HOSPADM

## 2022-01-01 RX ORDER — SODIUM CHLORIDE, SODIUM LACTATE, POTASSIUM CHLORIDE, CALCIUM CHLORIDE 600; 310; 30; 20 MG/100ML; MG/100ML; MG/100ML; MG/100ML
INJECTION, SOLUTION INTRAVENOUS CONTINUOUS
Status: ACTIVE | OUTPATIENT
Start: 2022-01-01 | End: 2022-01-01

## 2022-01-01 RX ORDER — MORPHINE SULFATE 2 MG/ML
1-2 INJECTION, SOLUTION INTRAMUSCULAR; INTRAVENOUS
Qty: 10 ML | Refills: 0
Start: 2022-01-01 | End: 2022-01-01

## 2022-01-01 RX ORDER — ONDANSETRON 2 MG/ML
4 INJECTION INTRAMUSCULAR; INTRAVENOUS EVERY 6 HOURS PRN
Qty: 50 ML | Refills: 0 | Status: SHIPPED | OUTPATIENT
Start: 2022-01-01

## 2022-01-01 RX ORDER — PIPERACILLIN SODIUM, TAZOBACTAM SODIUM 3; .375 G/15ML; G/15ML
3.38 INJECTION, POWDER, LYOPHILIZED, FOR SOLUTION INTRAVENOUS ONCE
Status: COMPLETED | OUTPATIENT
Start: 2022-01-01 | End: 2022-01-01

## 2022-01-01 RX ORDER — FLUORIDE TOOTHPASTE
15 TOOTHPASTE DENTAL 4 TIMES DAILY PRN
Qty: 100 ML | Refills: 0 | Status: SHIPPED | OUTPATIENT
Start: 2022-01-01

## 2022-01-01 RX ORDER — FLUORIDE TOOTHPASTE
15 TOOTHPASTE DENTAL
Status: DISCONTINUED | OUTPATIENT
Start: 2022-01-01 | End: 2022-01-01 | Stop reason: HOSPADM

## 2022-01-01 RX ORDER — FLUORIDE TOOTHPASTE
15 TOOTHPASTE DENTAL 4 TIMES DAILY PRN
Status: DISCONTINUED | OUTPATIENT
Start: 2022-01-01 | End: 2022-01-01 | Stop reason: HOSPADM

## 2022-01-01 RX ORDER — NALOXONE HYDROCHLORIDE 0.4 MG/ML
0.1 INJECTION, SOLUTION INTRAMUSCULAR; INTRAVENOUS; SUBCUTANEOUS
Status: DISCONTINUED | OUTPATIENT
Start: 2022-01-01 | End: 2022-01-01 | Stop reason: HOSPADM

## 2022-01-01 RX ORDER — MORPHINE SULFATE 10 MG/5ML
5 SOLUTION ORAL EVERY 4 HOURS PRN
Qty: 40 ML | Refills: 0 | Status: SHIPPED | OUTPATIENT
Start: 2022-01-01 | End: 2022-01-01

## 2022-01-01 RX ORDER — ACETAMINOPHEN 650 MG/1
650 SUPPOSITORY RECTAL 3 TIMES DAILY
Status: DISCONTINUED | OUTPATIENT
Start: 2022-01-01 | End: 2022-01-01 | Stop reason: HOSPADM

## 2022-01-01 RX ORDER — CARBOXYMETHYLCELLULOSE SODIUM 5 MG/ML
1 SOLUTION/ DROPS OPHTHALMIC 2 TIMES DAILY
Status: DISCONTINUED | OUTPATIENT
Start: 2022-01-01 | End: 2022-01-01 | Stop reason: RX

## 2022-01-01 RX ORDER — DEXTROSE MONOHYDRATE 50 MG/ML
INJECTION, SOLUTION INTRAVENOUS CONTINUOUS
Status: DISCONTINUED | OUTPATIENT
Start: 2022-01-01 | End: 2022-01-01

## 2022-01-01 RX ORDER — NICOTINE POLACRILEX 4 MG
15-30 LOZENGE BUCCAL
Status: DISCONTINUED | OUTPATIENT
Start: 2022-01-01 | End: 2022-01-01 | Stop reason: HOSPADM

## 2022-01-01 RX ORDER — TRIMETHOPRIM 100 MG/1
100 TABLET ORAL DAILY
Status: ON HOLD | COMMUNITY
End: 2022-01-01

## 2022-01-01 RX ORDER — DIPHENHYDRAMINE HCL 25 MG
25 CAPSULE ORAL
Status: DISCONTINUED | OUTPATIENT
Start: 2022-01-01 | End: 2022-01-01

## 2022-01-01 RX ORDER — ONDANSETRON 2 MG/ML
4 INJECTION INTRAMUSCULAR; INTRAVENOUS EVERY 6 HOURS PRN
Status: DISCONTINUED | OUTPATIENT
Start: 2022-01-01 | End: 2022-01-01 | Stop reason: HOSPADM

## 2022-01-01 RX ORDER — ALBUTEROL SULFATE 0.83 MG/ML
2.5 SOLUTION RESPIRATORY (INHALATION)
Status: DISCONTINUED | OUTPATIENT
Start: 2022-01-01 | End: 2022-01-01

## 2022-01-01 RX ORDER — IOPAMIDOL 755 MG/ML
75 INJECTION, SOLUTION INTRAVASCULAR ONCE
Status: COMPLETED | OUTPATIENT
Start: 2022-01-01 | End: 2022-01-01

## 2022-01-01 RX ORDER — POTASSIUM CHLORIDE 7.45 MG/ML
10 INJECTION INTRAVENOUS
Status: DISPENSED | OUTPATIENT
Start: 2022-01-01 | End: 2022-01-01

## 2022-01-01 RX ADMIN — DEXTROSE MONOHYDRATE: 50 INJECTION, SOLUTION INTRAVENOUS at 13:38

## 2022-01-01 RX ADMIN — SODIUM CHLORIDE 500 ML: 9 INJECTION, SOLUTION INTRAVENOUS at 18:39

## 2022-01-01 RX ADMIN — METOPROLOL TARTRATE 2.5 MG: 5 INJECTION INTRAVENOUS at 00:40

## 2022-01-01 RX ADMIN — POTASSIUM CHLORIDE 10 MEQ: 7.46 INJECTION, SOLUTION INTRAVENOUS at 13:39

## 2022-01-01 RX ADMIN — FENTANYL CITRATE 12.5 MCG: 50 INJECTION, SOLUTION INTRAMUSCULAR; INTRAVENOUS at 07:57

## 2022-01-01 RX ADMIN — PIPERACILLIN AND TAZOBACTAM 3.38 G: 3; .375 INJECTION, POWDER, FOR SOLUTION INTRAVENOUS at 18:14

## 2022-01-01 RX ADMIN — DEXTROSE MONOHYDRATE: 50 INJECTION, SOLUTION INTRAVENOUS at 08:04

## 2022-01-01 RX ADMIN — BRIMONIDINE TARTRATE 1 DROP: 2 SOLUTION/ DROPS OPHTHALMIC at 08:09

## 2022-01-01 RX ADMIN — METOPROLOL TARTRATE 2.5 MG: 5 INJECTION INTRAVENOUS at 07:20

## 2022-01-01 RX ADMIN — MORPHINE SULFATE 2 MG: 10 SOLUTION ORAL at 12:14

## 2022-01-01 RX ADMIN — FENTANYL CITRATE 12.5 MCG: 50 INJECTION, SOLUTION INTRAMUSCULAR; INTRAVENOUS at 18:33

## 2022-01-01 RX ADMIN — LATANOPROST 1 DROP: 50 SOLUTION/ DROPS OPHTHALMIC at 21:37

## 2022-01-01 RX ADMIN — ALBUTEROL SULFATE 2.5 MG: 2.5 SOLUTION RESPIRATORY (INHALATION) at 14:07

## 2022-01-01 RX ADMIN — PANTOPRAZOLE SODIUM 40 MG: 40 INJECTION, POWDER, FOR SOLUTION INTRAVENOUS at 07:15

## 2022-01-01 RX ADMIN — MORPHINE SULFATE 2 MG: 2 INJECTION, SOLUTION INTRAMUSCULAR; INTRAVENOUS at 10:00

## 2022-01-01 RX ADMIN — ACETAMINOPHEN AND CODEINE PHOSPHATE 1 TABLET: 300; 30 TABLET ORAL at 22:05

## 2022-01-01 RX ADMIN — ACETAMINOPHEN 650 MG: 650 SUPPOSITORY RECTAL at 20:24

## 2022-01-01 RX ADMIN — ATROPINE SULFATE 2 DROP: 10 SOLUTION/ DROPS OPHTHALMIC at 00:02

## 2022-01-01 RX ADMIN — BRIMONIDINE TARTRATE 1 DROP: 2 SOLUTION/ DROPS OPHTHALMIC at 21:00

## 2022-01-01 RX ADMIN — DEXTROSE MONOHYDRATE 25 ML: 25 INJECTION, SOLUTION INTRAVENOUS at 06:16

## 2022-01-01 RX ADMIN — PANTOPRAZOLE SODIUM 20 MG: 20 TABLET, DELAYED RELEASE ORAL at 07:45

## 2022-01-01 RX ADMIN — CARBOXYMETHYLCELLULOSE SODIUM 1 DROP: 5 SOLUTION/ DROPS OPHTHALMIC at 20:40

## 2022-01-01 RX ADMIN — SODIUM CHLORIDE, POTASSIUM CHLORIDE, SODIUM LACTATE AND CALCIUM CHLORIDE: 600; 310; 30; 20 INJECTION, SOLUTION INTRAVENOUS at 20:22

## 2022-01-01 RX ADMIN — MORPHINE SULFATE 2 MG: 2 INJECTION, SOLUTION INTRAMUSCULAR; INTRAVENOUS at 09:27

## 2022-01-01 RX ADMIN — LORAZEPAM 1 MG: 2 LIQUID ORAL at 14:34

## 2022-01-01 RX ADMIN — ACETYLCYSTEINE 2 ML: 200 SOLUTION ORAL; RESPIRATORY (INHALATION) at 20:17

## 2022-01-01 RX ADMIN — CARBOXYMETHYLCELLULOSE SODIUM 1 DROP: 5 SOLUTION/ DROPS OPHTHALMIC at 20:12

## 2022-01-01 RX ADMIN — CARBOXYMETHYLCELLULOSE SODIUM 1 DROP: 5 SOLUTION/ DROPS OPHTHALMIC at 07:44

## 2022-01-01 RX ADMIN — PIPERACILLIN AND TAZOBACTAM 3.38 G: 3; .375 INJECTION, POWDER, FOR SOLUTION INTRAVENOUS at 23:26

## 2022-01-01 RX ADMIN — POTASSIUM CHLORIDE 10 MEQ: 7.46 INJECTION, SOLUTION INTRAVENOUS at 14:32

## 2022-01-01 RX ADMIN — METOPROLOL TARTRATE 2.5 MG: 5 INJECTION INTRAVENOUS at 00:10

## 2022-01-01 RX ADMIN — PIPERACILLIN AND TAZOBACTAM 3.38 G: 3; .375 INJECTION, POWDER, FOR SOLUTION INTRAVENOUS at 00:02

## 2022-01-01 RX ADMIN — SODIUM CHLORIDE, POTASSIUM CHLORIDE, SODIUM LACTATE AND CALCIUM CHLORIDE: 600; 310; 30; 20 INJECTION, SOLUTION INTRAVENOUS at 01:04

## 2022-01-01 RX ADMIN — MAGNESIUM SULFATE HEPTAHYDRATE 2 G: 40 INJECTION, SOLUTION INTRAVENOUS at 09:32

## 2022-01-01 RX ADMIN — MORPHINE SULFATE 2 MG: 2 INJECTION, SOLUTION INTRAMUSCULAR; INTRAVENOUS at 18:03

## 2022-01-01 RX ADMIN — CARBOXYMETHYLCELLULOSE SODIUM 1 DROP: 5 SOLUTION/ DROPS OPHTHALMIC at 21:00

## 2022-01-01 RX ADMIN — FENTANYL CITRATE 12.5 MCG: 50 INJECTION, SOLUTION INTRAMUSCULAR; INTRAVENOUS at 12:20

## 2022-01-01 RX ADMIN — BRIMONIDINE TARTRATE 1 DROP: 2 SOLUTION/ DROPS OPHTHALMIC at 10:40

## 2022-01-01 RX ADMIN — ACETAMINOPHEN 650 MG: 650 SUPPOSITORY RECTAL at 20:35

## 2022-01-01 RX ADMIN — METOPROLOL SUCCINATE 25 MG: 25 TABLET, EXTENDED RELEASE ORAL at 21:01

## 2022-01-01 RX ADMIN — MORPHINE SULFATE 2 MG: 10 SOLUTION ORAL at 09:06

## 2022-01-01 RX ADMIN — ALBUTEROL SULFATE 2.5 MG: 2.5 SOLUTION RESPIRATORY (INHALATION) at 20:17

## 2022-01-01 RX ADMIN — PIPERACILLIN AND TAZOBACTAM 3.38 G: 3; .375 INJECTION, POWDER, FOR SOLUTION INTRAVENOUS at 09:05

## 2022-01-01 RX ADMIN — ALBUTEROL SULFATE 2.5 MG: 2.5 SOLUTION RESPIRATORY (INHALATION) at 13:16

## 2022-01-01 RX ADMIN — ACETAMINOPHEN 650 MG: 650 SUPPOSITORY RECTAL at 13:38

## 2022-01-01 RX ADMIN — LATANOPROST 1 DROP: 50 SOLUTION/ DROPS OPHTHALMIC at 22:40

## 2022-01-01 RX ADMIN — LIDOCAINE 1 PATCH: 246 PATCH TOPICAL at 20:12

## 2022-01-01 RX ADMIN — TIMOLOL MALEATE 1 DROP: 5 SOLUTION/ DROPS OPHTHALMIC at 07:45

## 2022-01-01 RX ADMIN — PIPERACILLIN AND TAZOBACTAM 3.38 G: 3; .375 INJECTION, POWDER, FOR SOLUTION INTRAVENOUS at 10:40

## 2022-01-01 RX ADMIN — DEXTROSE AND SODIUM CHLORIDE: 5; 900 INJECTION, SOLUTION INTRAVENOUS at 05:59

## 2022-01-01 RX ADMIN — BARIUM SULFATE 20 ML: 400 SUSPENSION ORAL at 10:21

## 2022-01-01 RX ADMIN — ENOXAPARIN SODIUM 40 MG: 100 INJECTION SUBCUTANEOUS at 20:40

## 2022-01-01 RX ADMIN — METOPROLOL TARTRATE 2.5 MG: 5 INJECTION INTRAVENOUS at 12:07

## 2022-01-01 RX ADMIN — TIMOLOL MALEATE 1 DROP: 5 SOLUTION/ DROPS OPHTHALMIC at 08:03

## 2022-01-01 RX ADMIN — VANCOMYCIN HYDROCHLORIDE 1000 MG: 5 INJECTION, POWDER, LYOPHILIZED, FOR SOLUTION INTRAVENOUS at 21:58

## 2022-01-01 RX ADMIN — PIPERACILLIN AND TAZOBACTAM 3.38 G: 3; .375 INJECTION, POWDER, FOR SOLUTION INTRAVENOUS at 00:10

## 2022-01-01 RX ADMIN — PIPERACILLIN AND TAZOBACTAM 3.38 G: 3; .375 INJECTION, POWDER, FOR SOLUTION INTRAVENOUS at 07:44

## 2022-01-01 RX ADMIN — ACETAMINOPHEN 650 MG: 650 SUPPOSITORY RECTAL at 09:06

## 2022-01-01 RX ADMIN — IOPAMIDOL 75 ML: 755 INJECTION, SOLUTION INTRAVENOUS at 19:23

## 2022-01-01 RX ADMIN — DEXTROSE MONOHYDRATE 25 ML: 25 INJECTION, SOLUTION INTRAVENOUS at 05:59

## 2022-01-01 RX ADMIN — POTASSIUM CHLORIDE 10 MEQ: 7.46 INJECTION, SOLUTION INTRAVENOUS at 15:45

## 2022-01-01 RX ADMIN — MORPHINE SULFATE 2 MG: 10 SOLUTION ORAL at 14:05

## 2022-01-01 RX ADMIN — CARBOXYMETHYLCELLULOSE SODIUM 1 DROP: 5 SOLUTION/ DROPS OPHTHALMIC at 09:04

## 2022-01-01 RX ADMIN — ASPIRIN 81 MG: 81 TABLET, COATED ORAL at 07:44

## 2022-01-01 RX ADMIN — ENOXAPARIN SODIUM 40 MG: 100 INJECTION SUBCUTANEOUS at 20:12

## 2022-01-01 RX ADMIN — VANCOMYCIN HYDROCHLORIDE 1000 MG: 5 INJECTION, POWDER, LYOPHILIZED, FOR SOLUTION INTRAVENOUS at 20:27

## 2022-01-01 RX ADMIN — BRIMONIDINE TARTRATE 1 DROP: 2 SOLUTION/ DROPS OPHTHALMIC at 21:37

## 2022-01-01 RX ADMIN — PIPERACILLIN AND TAZOBACTAM 3.38 G: 3; .375 INJECTION, POWDER, FOR SOLUTION INTRAVENOUS at 16:44

## 2022-01-01 RX ADMIN — ALBUTEROL SULFATE 2.5 MG: 2.5 SOLUTION RESPIRATORY (INHALATION) at 02:07

## 2022-01-01 RX ADMIN — POTASSIUM CHLORIDE 10 MEQ: 7.46 INJECTION, SOLUTION INTRAVENOUS at 09:31

## 2022-01-01 RX ADMIN — MORPHINE SULFATE 2 MG: 10 SOLUTION ORAL at 06:11

## 2022-01-01 RX ADMIN — BRIMONIDINE TARTRATE 1 DROP: 2 SOLUTION/ DROPS OPHTHALMIC at 20:41

## 2022-01-01 RX ADMIN — ACETYLCYSTEINE 2 ML: 200 SOLUTION ORAL; RESPIRATORY (INHALATION) at 13:16

## 2022-01-01 RX ADMIN — Medication 15 ML: at 18:13

## 2022-01-01 RX ADMIN — METOPROLOL SUCCINATE 25 MG: 25 TABLET, EXTENDED RELEASE ORAL at 07:44

## 2022-01-01 RX ADMIN — CARBOXYMETHYLCELLULOSE SODIUM 1 DROP: 5 SOLUTION/ DROPS OPHTHALMIC at 08:15

## 2022-01-01 RX ADMIN — CARBOXYMETHYLCELLULOSE SODIUM 1 DROP: 5 SOLUTION/ DROPS OPHTHALMIC at 21:37

## 2022-01-01 RX ADMIN — VANCOMYCIN HYDROCHLORIDE 1250 MG: 5 INJECTION, POWDER, LYOPHILIZED, FOR SOLUTION INTRAVENOUS at 19:36

## 2022-01-01 RX ADMIN — Medication 15 ML: at 09:04

## 2022-01-01 RX ADMIN — PIPERACILLIN AND TAZOBACTAM 3.38 G: 3; .375 INJECTION, POWDER, FOR SOLUTION INTRAVENOUS at 00:19

## 2022-01-01 RX ADMIN — ACETYLCYSTEINE 2 ML: 200 SOLUTION ORAL; RESPIRATORY (INHALATION) at 08:01

## 2022-01-01 RX ADMIN — ENOXAPARIN SODIUM 40 MG: 100 INJECTION SUBCUTANEOUS at 20:23

## 2022-01-01 RX ADMIN — ACETYLCYSTEINE 2 ML: 200 SOLUTION ORAL; RESPIRATORY (INHALATION) at 02:07

## 2022-01-01 RX ADMIN — ALPRAZOLAM 0.5 MG: 0.25 TABLET ORAL at 20:23

## 2022-01-01 RX ADMIN — TIMOLOL MALEATE 1 DROP: 5 SOLUTION/ DROPS OPHTHALMIC at 10:41

## 2022-01-01 RX ADMIN — Medication 2 SPRAY: at 09:04

## 2022-01-01 RX ADMIN — POTASSIUM CHLORIDE 10 MEQ: 7.46 INJECTION, SOLUTION INTRAVENOUS at 11:41

## 2022-01-01 RX ADMIN — BRIMONIDINE TARTRATE 1 DROP: 2 SOLUTION/ DROPS OPHTHALMIC at 20:13

## 2022-01-01 RX ADMIN — CARBOXYMETHYLCELLULOSE SODIUM 1 DROP: 5 SOLUTION/ DROPS OPHTHALMIC at 10:41

## 2022-01-01 RX ADMIN — TIMOLOL MALEATE 1 DROP: 5 SOLUTION/ DROPS OPHTHALMIC at 09:05

## 2022-01-01 RX ADMIN — LIDOCAINE 1 PATCH: 246 PATCH TOPICAL at 20:35

## 2022-01-01 RX ADMIN — METOPROLOL TARTRATE 2.5 MG: 5 INJECTION INTRAVENOUS at 13:06

## 2022-01-01 RX ADMIN — DEXTROSE MONOHYDRATE: 50 INJECTION, SOLUTION INTRAVENOUS at 17:20

## 2022-01-01 RX ADMIN — MORPHINE SULFATE 2 MG: 2 INJECTION, SOLUTION INTRAMUSCULAR; INTRAVENOUS at 08:30

## 2022-01-01 RX ADMIN — FENTANYL CITRATE 12.5 MCG: 50 INJECTION, SOLUTION INTRAMUSCULAR; INTRAVENOUS at 12:05

## 2022-01-01 RX ADMIN — LATANOPROST 1 DROP: 50 SOLUTION/ DROPS OPHTHALMIC at 21:59

## 2022-01-01 RX ADMIN — ENOXAPARIN SODIUM 40 MG: 100 INJECTION SUBCUTANEOUS at 20:58

## 2022-01-01 RX ADMIN — MAGNESIUM SULFATE HEPTAHYDRATE 2 G: 40 INJECTION, SOLUTION INTRAVENOUS at 06:34

## 2022-01-01 RX ADMIN — FENTANYL CITRATE 12.5 MCG: 50 INJECTION, SOLUTION INTRAMUSCULAR; INTRAVENOUS at 17:04

## 2022-01-01 RX ADMIN — PIPERACILLIN AND TAZOBACTAM 3.38 G: 3; .375 INJECTION, POWDER, FOR SOLUTION INTRAVENOUS at 15:55

## 2022-01-01 RX ADMIN — PIPERACILLIN AND TAZOBACTAM 3.38 G: 3; .375 INJECTION, POWDER, FOR SOLUTION INTRAVENOUS at 15:46

## 2022-01-01 RX ADMIN — METOPROLOL TARTRATE 2.5 MG: 5 INJECTION INTRAVENOUS at 06:25

## 2022-01-01 RX ADMIN — LATANOPROST 1 DROP: 50 SOLUTION/ DROPS OPHTHALMIC at 22:27

## 2022-01-01 RX ADMIN — ACETYLCYSTEINE 2 ML: 200 SOLUTION ORAL; RESPIRATORY (INHALATION) at 14:07

## 2022-01-01 RX ADMIN — POTASSIUM CHLORIDE 10 MEQ: 7.46 INJECTION, SOLUTION INTRAVENOUS at 07:15

## 2022-01-01 RX ADMIN — ALPRAZOLAM 0.5 MG: 0.25 TABLET ORAL at 20:58

## 2022-01-01 RX ADMIN — ALBUTEROL SULFATE 2.5 MG: 2.5 SOLUTION RESPIRATORY (INHALATION) at 08:01

## 2022-01-01 RX ADMIN — DEXTROSE MONOHYDRATE: 50 INJECTION, SOLUTION INTRAVENOUS at 03:30

## 2022-01-01 RX ADMIN — METOPROLOL TARTRATE 2.5 MG: 5 INJECTION INTRAVENOUS at 18:12

## 2022-01-01 RX ADMIN — PANTOPRAZOLE SODIUM 40 MG: 40 INJECTION, POWDER, FOR SOLUTION INTRAVENOUS at 08:04

## 2022-01-01 RX ADMIN — BRIMONIDINE TARTRATE 1 DROP: 2 SOLUTION/ DROPS OPHTHALMIC at 07:45

## 2022-01-01 RX ADMIN — BRIMONIDINE TARTRATE 1 DROP: 2 SOLUTION/ DROPS OPHTHALMIC at 09:04

## 2022-01-01 RX ADMIN — LIDOCAINE 1 PATCH: 246 PATCH TOPICAL at 20:40

## 2022-01-01 RX ADMIN — PERFLUTREN 2 ML: 6.52 INJECTION, SUSPENSION INTRAVENOUS at 08:50

## 2022-01-01 ASSESSMENT — ACTIVITIES OF DAILY LIVING (ADL)
ADLS_ACUITY_SCORE: 35
ADLS_ACUITY_SCORE: 39
ADLS_ACUITY_SCORE: 39
TOILETING_ISSUES: YES
ADLS_ACUITY_SCORE: 43
ADLS_ACUITY_SCORE: 41
ADLS_ACUITY_SCORE: 39
EATING/SWALLOWING: SWALLOWING LIQUIDS;SWALLOWING SOLID FOOD
ADLS_ACUITY_SCORE: 41
ADLS_ACUITY_SCORE: 41
ADLS_ACUITY_SCORE: 36
ADLS_ACUITY_SCORE: 39
ADLS_ACUITY_SCORE: 43
ADLS_ACUITY_SCORE: 39
FALL_HISTORY_WITHIN_LAST_SIX_MONTHS: NO
ADLS_ACUITY_SCORE: 39
ADLS_ACUITY_SCORE: 41
EATING: 0-->INDEPENDENT
ADLS_ACUITY_SCORE: 36
ADLS_ACUITY_SCORE: 45
ADLS_ACUITY_SCORE: 39
ADLS_ACUITY_SCORE: 39
ADLS_ACUITY_SCORE: 43
ADLS_ACUITY_SCORE: 41
ADLS_ACUITY_SCORE: 39
ADLS_ACUITY_SCORE: 41
ADLS_ACUITY_SCORE: 39
ADLS_ACUITY_SCORE: 39
ADLS_ACUITY_SCORE: 35
ADLS_ACUITY_SCORE: 37
ADLS_ACUITY_SCORE: 39
ADLS_ACUITY_SCORE: 36
TOILETING_ASSISTANCE: TOILETING DIFFICULTY, ASSISTANCE 1 PERSON
ADLS_ACUITY_SCORE: 39
EATING/SWALLOWING_MANAGEMENT: SWALLOW EVAL
ADLS_ACUITY_SCORE: 35
ADLS_ACUITY_SCORE: 35
ADLS_ACUITY_SCORE: 43
ADLS_ACUITY_SCORE: 41
ADLS_ACUITY_SCORE: 41
SWALLOWING: 2-->DIFFICULTY SWALLOWING LIQUIDS/FOODS
TOILETING: 2-->COMPLETELY DEPENDENT
DOING_ERRANDS_INDEPENDENTLY_DIFFICULTY: YES
ADLS_ACUITY_SCORE: 41
ADLS_ACUITY_SCORE: 36
ADLS_ACUITY_SCORE: 39
ADLS_ACUITY_SCORE: 41
WALKING_OR_CLIMBING_STAIRS_DIFFICULTY: NO
ADLS_ACUITY_SCORE: 39
TOILETING: 2-->COMPLETELY DEPENDENT (NOT DEVELOPMENTALLY APPROPRIATE)
CHANGE_IN_FUNCTIONAL_STATUS_SINCE_ONSET_OF_CURRENT_ILLNESS/INJURY: YES
ADLS_ACUITY_SCORE: 41
ADLS_ACUITY_SCORE: 39
DIFFICULTY_EATING/SWALLOWING: YES
ADLS_ACUITY_SCORE: 37
ADLS_ACUITY_SCORE: 37
ADLS_ACUITY_SCORE: 35
ADLS_ACUITY_SCORE: 39
ADLS_ACUITY_SCORE: 43
ADLS_ACUITY_SCORE: 41
ADLS_ACUITY_SCORE: 43
ADLS_ACUITY_SCORE: 35
ADLS_ACUITY_SCORE: 41
ADLS_ACUITY_SCORE: 41
ADLS_ACUITY_SCORE: 39
ADLS_ACUITY_SCORE: 39
CONCENTRATING,_REMEMBERING_OR_MAKING_DECISIONS_DIFFICULTY: NO
ADLS_ACUITY_SCORE: 39
ADLS_ACUITY_SCORE: 41
ADLS_ACUITY_SCORE: 45
ADLS_ACUITY_SCORE: 35
ADLS_ACUITY_SCORE: 36
WEAR_GLASSES_OR_BLIND: NO
ADLS_ACUITY_SCORE: 43
DRESSING/BATHING_DIFFICULTY: NO
ADLS_ACUITY_SCORE: 35
ADLS_ACUITY_SCORE: 36
SWALLOWING: 2-->DIFFICULTY SWALLOWING LIQUIDS/FOODS
ADLS_ACUITY_SCORE: 41
ADLS_ACUITY_SCORE: 43
ADLS_ACUITY_SCORE: 43
ADLS_ACUITY_SCORE: 39
ADLS_ACUITY_SCORE: 41
EATING: 0-->INDEPENDENT
ADLS_ACUITY_SCORE: 41
ADLS_ACUITY_SCORE: 36
ADLS_ACUITY_SCORE: 39

## 2022-01-01 ASSESSMENT — ENCOUNTER SYMPTOMS
COUGH: 1
SHORTNESS OF BREATH: 1

## 2022-10-31 NOTE — PROGRESS NOTES
Clinic Care Coordination Contact  [facility]/Voicemail       Clinical Data: Care Coordinator Outreach  Outreach attempted x 1.  Left message on patient's son Ramirez' voicemail with call back information and requested return call.  Plan: Care Coordinator will try to reach patient again in 1-2 business days.    Sharee Sandra [facility]  Social Work Care Coordinator - [facility]  Care Coordination  [email or web address]  Cell Phone: [phone]  Gender pronouns: she/her  Employed by [employer]

## 2022-11-01 PROBLEM — R79.89 ELEVATED D-DIMER: Status: ACTIVE | Noted: 2022-01-01

## 2022-11-01 PROBLEM — J18.9 PNEUMONIA OF BOTH LOWER LOBES DUE TO INFECTIOUS ORGANISM: Status: ACTIVE | Noted: 2022-01-01

## 2022-11-01 PROBLEM — R09.02 HYPOXIA: Status: ACTIVE | Noted: 2022-01-01

## 2022-11-01 NOTE — PHARMACY-ADMISSION MEDICATION HISTORY
Pharmacy Note - Admission Medication History    Pertinent Provider Information: can bring eye drops     ______________________________________________________________________    Prior To Admission (PTA) med list completed and updated in EMR.       PTA Med List   Medication Sig Last Dose     acetaminophen-codeine (TYLENOL #3) 300-30 mg per tablet [ACETAMINOPHEN-CODEINE (TYLENOL #3) 300-30 MG PER TABLET] Take 1 tablet by mouth every 4 (four) hours as needed. 11/1/2022 at 1400     ALPRAZolam (XANAX) 0.5 MG tablet [ALPRAZOLAM (XANAX) 0.5 MG TABLET] Take 1 tablet (0.5 mg total) by mouth every evening. 10/31/2022     amLODIPine (NORVASC) 2.5 MG tablet Take 2.5 mg by mouth daily 11/1/2022     amoxicillin (AMOXIL) 500 MG capsule Take 2,000 mg by mouth daily as needed (pre-dental) More than a month     aspirin 81 MG EC tablet [ASPIRIN 81 MG EC TABLET] Take 81 mg by mouth daily. 11/1/2022     brimonidine (ALPHAGAN) 0.2 % ophthalmic solution [BRIMONIDINE (ALPHAGAN) 0.2 % OPHTHALMIC SOLUTION] Administer 1 drop to the right eye 2 (two) times a day. 11/1/2022 at am.  can bring     carboxymethylcellulose PF (REFRESH PLUS) 0.5 % ophthalmic solution Place 1 drop Into the left eye 2 times daily 11/1/2022 at can bring     diphenhydrAMINE (BENADRYL) 25 MG capsule Take 25 mg by mouth nightly as needed for sleep Past Week     ezetimibe (ZETIA) 10 mg tablet [EZETIMIBE (ZETIA) 10 MG TABLET] Take 5 mg by mouth every evening.  10/31/2022     HEMP OIL OR EXTRACT OR OTHER CBD CANNABINOID, NOT MEDICAL CANNABIS, Apply 1 Application topically as needed Cbd cream Past Week     latanoprost (XALATAN) 0.005 % ophthalmic solution [LATANOPROST (XALATAN) 0.005 % OPHTHALMIC SOLUTION] Administer 1 drop to the right eye at bedtime.  10/31/2022 at can bring     Methyl Salicylate 20 % GEL Externally apply 1 Application topically 3 times daily Past Week     metoprolol succinate ER (TOPROL XL) 50 MG 24 hr tablet Take 25 mg by mouth 2 times daily 11/1/2022 at  am     nitroglycerin (NITROSTAT) 0.4 MG SL tablet [NITROGLYCERIN (NITROSTAT) 0.4 MG SL TABLET] Place 0.4 mg under the tongue every 5 (five) minutes as needed for chest pain. PRN     pantoprazole (PROTONIX) 20 MG tablet [PANTOPRAZOLE (PROTONIX) 20 MG TABLET] Take 20 mg by mouth daily. 11/1/2022 at am     rosuvastatin (CRESTOR) 5 MG tablet [ROSUVASTATIN (CRESTOR) 5 MG TABLET] Take 5 mg by mouth bedtime. 10/31/2022     timolol maleate (TIMOPTIC) 0.5 % ophthalmic solution Place 1 drop into the right eye every morning 11/1/2022 at can bring     trimethoprim (TRIMPEX) 100 MG tablet Take 100 mg by mouth daily 11/1/2022 at am     vitamins  A,C,E-zinc-copper (PRESERVISION AREDS) [phone] unit-mg-unit cap [VITAMINS  A,C,E-ZINC-COPPER (PRESERVISION AREDS) 14,320-226-200 UNIT-MG-UNIT CAP] Take 1 tablet by mouth 2 (two) times a day.  11/1/2022 at am       Information source(s): Patient, Family member and [facility]  Method of interview communication: in-person    Summary of Changes to PTA Med List  New: benadryl, methyl salicylate, refresh, trimethoprim, amlodipine, cbd cream, amoxicillin  Discontinued: tylenol, flomax  Changed: metoprolol dosing     Patient was asked about OTC/herbal products specifically.  PTA med list reflects this.    In the past week, patient estimated taking medication this percent of the time:  greater than 90%.    Allergies were reviewed, assessed, and updated with the patient.      Medications currently not available for use during hospital stay. Family/Patient representative states they will bring eye drops to [facility].    The information provided in this note is only as accurate as the sources available at the time of the update(s).    Thank you for the opportunity to participate in the care of this patient.    Cherelle Booker [facility]  11/1/2022 6:08 PM

## 2022-11-01 NOTE — PHARMACY-VANCOMYCIN DOSING SERVICE
"Pharmacy Vancomycin Initial Note  Date of Service 2022  Patient's  1930  89 year old, male    Indication: Community Acquired Pneumonia    Current estimated CrCl = CrCl cannot be calculated (Unknown ideal weight.).    Creatinine for last 3 days  2022:  3:41 PM Creatinine 1.20 mg/dL    Recent Vancomycin Level(s) for last 3 days  No results found for requested labs within last 72 hours.      Vancomycin IV Administrations (past 72 hours)      No vancomycin orders with administrations in past 72 hours.                Nephrotoxins and other renal medications (From now, onward)    Start     Dose/Rate Route Frequency Ordered Stop    22 1900  vancomycin (VANCOCIN) 1,250 mg in 0.9% NaCl 250 mL intermittent infusion         1,250 mg  over 90 Minutes Intravenous ONCE 22 1840      22 1730  piperacillin-tazobactam (ZOSYN) 3.375 g vial to attach to  mL bag        Note to Pharmacy: For [facility], [facility] and [facility]: For Zosyn-naive patients, use the \"Zosyn initial dose + extended infusion\" order panel.    3.375 g  over 30 Minutes Intravenous ONCE 22 1709            Contrast Orders - past 72 hours (72h ago, onward)    None              Plan:  1. Start vancomycin  1250 mg IV once  2. Vancomycin monitoring method: AUC  3. Vancomycin therapeutic monitoring goal: 400-600 mg*h/L  4. Please re-order vanco if intent is to continue inpatient     Cherelle Booker RP    "

## 2022-11-01 NOTE — ED TRIAGE NOTES
Pt presents to the ED with c/o SOB and cough that has been going on for a week. EMS reports hx of pneumonia. Pt states last time he had it was 4 years ago. Pt comes from a townhouse, lives with wife and daughter. Daughter has been keeping an eye on O2, called EMS with low O2 sats. EMS reported 81% on RA. Oxymask placed upon arrival, O2 85% on 15L. RT placed high flow NC.      Triage Assessment     Row Name 11/01/22 6537       Triage Assessment (Adult)    Airway WDL WDL       Respiratory WDL    Respiratory WDL cough    Cough Frequency infrequent    Cough Type dry       Skin Circulation/Temperature WDL    Skin Circulation/Temperature WDL WDL       Cardiac WDL    Cardiac WDL WDL       Peripheral/Neurovascular WDL    Peripheral Neurovascular WDL WDL       Cognitive/Neuro/Behavioral WDL    Cognitive/Neuro/Behavioral WDL WDL

## 2022-11-01 NOTE — ED NOTES
Bed: [facility]ED-12  Expected date:   Expected time:   Means of arrival:   Comments:  LAUREN 89 y.o high flow

## 2022-11-01 NOTE — ED NOTES
Pt came in via EMS. Pt was on 4 lpm. Pt needing more oxygen at this time. Pt was placed on HFNC 60 lpm and titrated up to 84%. Pt is 94-96% on these setting. RT will continue to monitor.    Debbie Lazaro, RT

## 2022-11-01 NOTE — ED PROVIDER NOTES
EMERGENCY DEPARTMENT ENCOUNTER      NAME: Zbigniew Alcocer  AGE: 89 year old male  YOB: 1930  MRN: [id number]  EVALUATION DATE & TIME: 11/1/2022  3:26 PM    PCP: Jeferson Scott    ED PROVIDER: Greg Pool M.D.      Chief Complaint   Patient presents with     Shortness of Breath     Cough         FINAL IMPRESSION:  1.  Acute dyspnea.  2.  Acute hypoxia.  3.  Acute bilateral pneumonia.    ED COURSE & MEDICAL DECISION MAKING:    3:45 PM I met with the patient to gather history and to perform my initial exam. We discussed plans for the ED course, including diagnostic testing and treatment. PPE worn: cloth mask, gloves, glasses.  Patient with shortness of breath and cough coming on for at least a week.  Hypoxic on room air.  Sats now up to 95% on high flow nasal cannula.  Patient wishes to be resuscitated and put on a ventilator if he should stop breathing.  Chest x-ray showing new bibasilar infiltrates.  D-dimer also elevated.  Chest CTA ordered.  [facility]  Patient and family in agreement.  5:44 PM.  Chest x-ray showing infiltrates.  Hospitalist called back and is in agreement with patient mission to [facility] inpatient.  He is aware that a chest CTA for PE of elation is pending and he will follow up on those results.    Critical care time exclusive of procedures: 30 minutes.    Pertinent Labs & Imaging studies reviewed. (See chart for details)    89 year old male presents to the Emergency Department for evaluation of shortness of breath.    At the conclusion of the encounter I discussed the results of all of the tests and the disposition. The questions were answered. The patient or family acknowledged understanding and was agreeable with the care plan.     Medical Decision Making    Supplemental history from: Family Member    External Record(s) Reviewed: Inpatient Record    Differential Diagnosis: See MDM charting for differential considered.   Multiple considerations include cardiac and respiratory causes such as pneumonia, blood clot, congestive heart failure, cardiac events, etc.    I performed an independent interpretation of the: EKG: See my documentation.    Discussed with radiology regarding test interpretation: N/A    Discussion of management with another provider: See ED Course and Hospitalist    The following testing was considered but ultimately not selected: None    I considered prescription management with: N/A    The patient's care impacted: None    Consideration of Admission/Observation: N/A - Patient admitted    Care significantly affected by Social Determinants of Health including: N/A       MEDICATIONS GIVEN IN THE EMERGENCY:  Medications - No data to display    NEW PRESCRIPTIONS STARTED AT TODAY'S ER VISIT  New Prescriptions    No medications on file          =================================================================    HPI    Patient information was obtained from: Patient    Use of : N/A       Zbigniew Alcocer is a 89 year old male with a pertinent history of CAD, hypertension, and hyperlipidemia who presents to this ED by EMS for evaluation of shortness of breath and cough.    Patient reports that they have had shortness of breath and a cough for a week now with leg swelling. His breathing is worse with laying down. Does have a history of pneumonia. EMS reported patient was on 81% on room air. Patient would like to be put on a ventilator if he stops breathing.     He does not identify any waxing or waning symptoms otherwise, exacerbating or alleviating features,associated symptoms except as mentioned. He denies any pain related complaints.    REVIEW OF SYSTEMS   Review of Systems   Respiratory: Positive for cough and shortness of breath.    Cardiovascular: Positive for leg swelling.   All other systems reviewed and are negative.       PAST MEDICAL HISTORY:  History reviewed. No pertinent past medical  "history.    PAST SURGICAL HISTORY:  Past Surgical History:   Procedure Laterality Date     BYPASS GRAFT ARTERY CORONARY  2003    3 vessel     CORONARY STENT PLACEMENT  2010     ESOPHAGOSCOPY, GASTROSCOPY, DUODENOSCOPY (EGD), COMBINED N/A 1/11/2019    Procedure: ESOPHAGOGASTRODUODENOSCOPY WITH DILATION;  Surgeon: John Olguin MD;  Location: [facility];  Service: Gastroenterology     TOTAL HIP ARTHROPLASTY Bilateral      ZZC CABG, VEIN, SINGLE      Description: CABG (CABG);  Proc Date: 10/30/2003;  Comments: LIMA to LAD, SVG to D1, SVG to PDA           CURRENT MEDICATIONS:    acetaminophen (TYLENOL) 500 MG tablet  acetaminophen-codeine (TYLENOL #3) 300-30 mg per tablet  ALPRAZolam (XANAX) 0.5 MG tablet  aspirin 81 MG EC tablet  brimonidine (ALPHAGAN) 0.2 % ophthalmic solution  ezetimibe (ZETIA) 10 mg tablet  latanoprost (XALATAN) 0.005 % ophthalmic solution  metoprolol succinate (TOPROL-XL) 50 MG 24 hr tablet  nitroglycerin (NITROSTAT) 0.4 MG SL tablet  pantoprazole (PROTONIX) 20 MG tablet  rosuvastatin (CRESTOR) 5 MG tablet  tamsulosin (FLOMAX) 0.4 mg cap  timolol maleate (TIMOPTIC) 0.5 % ophthalmic solution  vitamins  A,C,E-zinc-copper (PRESERVISION AREDS) 14,320-226-200 unit-mg-unit cap        ALLERGIES:  Allergies   Allergen Reactions     Duloxetine Unknown     \"Loss of appetite, disoriented\"     Lisinopril Cough     Pravastatin Unknown     Dry mouth     Sulfa (Sulfonamide Antibiotics) [Sulfa Drugs] Unknown     Multiple side effects - sensitive hearing, headache, ect.      Xtampza Er [No Clinical Screening - See Comments] Unknown     Chills, night sweats       FAMILY HISTORY:  History reviewed. No pertinent family history.    SOCIAL HISTORY:   Social History     Socioeconomic History     Marital status:      Spouse name: None     Number of children: None     Years of education: None     Highest education level: None   Tobacco Use     Smoking status: Never     Smokeless tobacco: Never   Substance and " Sexual Activity     Alcohol use: Yes     Alcohol/week: 1.0 standard drink     Drug use: No   Occasional alcohol.  No drugs or tobacco.    VITALS:  /56   Pulse 102   Temp 99.3  F (37.4  C) (Temporal)   Resp 20   SpO2 (!) 85%     PHYSICAL EXAM    Vital Signs:  /56   Pulse 102   Temp 99.3  F (37.4  C) (Temporal)   Resp 20   SpO2 (!) 85% Sats now up to 95%.  General:  On entering the room he is in no apparent distress.  Breathing comfortably at this time.  Neck:  Neck supple with full range of motion and nontender.    Back:  Back and spine are nontender.  No costovertebral angle tenderness.    HEENT:  Oropharynx clear with moist mucous membranes.  HEENT unremarkable.    Pulmonary:  Chest clear to auscultation without rhonchi rales or wheezing.    Cardiovascular:  Cardiac regular rate and rhythm without murmurs rubs or gallops.    Abdomen:  Abdomen soft nontender.  There is no rebound or guarding.    Muskuloskeletal:  he moves all 4 without any difficulty and has normal neurovascular exams.  Extremities without clubbing, cyanosis, or edema.  Legs and calves are nontender.    Neuro:  he is alert and oriented ×3 and moves all extremities symmetrically.    Psych:  Normal affect.    Skin:  Unremarkable and warm and dry.       LAB:  All pertinent labs reviewed and interpreted.  Labs Ordered and Resulted from Time of ED Arrival to Time of ED Departure   COMPREHENSIVE METABOLIC PANEL - Abnormal       Result Value    Sodium 135 (*)     Potassium 5.2 (*)     Chloride 102      Carbon Dioxide (CO2) 21 (*)     Anion Gap 12      Urea Nitrogen 26      Creatinine 1.20      Calcium 9.2      Glucose 139 (*)     Alkaline Phosphatase 291 (*)      (*)      (*)     Protein Total 6.9      Albumin 1.7 (*)     Bilirubin Total 0.9      GFR Estimate 57 (*)    B-TYPE NATRIURETIC PEPTIDE ( EAST ONLY) - Abnormal     (*)    D DIMER QUANTITATIVE - Abnormal    D-Dimer Quantitative 3.91 (*)    BLOOD GAS VENOUS  - Abnormal    pH Venous 7.46 (*)     pCO2 Venous 38      pO2 Venous 33      Bicarbonate Venous 27      Base Excess/Deficit (+/-) 3.3      Oxyhemoglobin Venous 63.8 (*)     O2 Sat, Venous 64.6 (*)    CBC WITH PLATELETS AND DIFFERENTIAL - Abnormal    WBC Count 10.0      RBC Count 4.31 (*)     Hemoglobin 13.0 (*)     Hematocrit 41.2      MCV 96      MCH 30.2      MCHC 31.6      RDW 13.5      Platelet Count 585 (*)    DIFFERENTIAL - Abnormal    % Neutrophils 87      % Lymphocytes 5      % Monocytes 7      % Eosinophils 0      % Basophils 0      % Myelocytes 1      Absolute Neutrophils 8.7 (*)     Absolute Lymphocytes 0.5 (*)     Absolute Monocytes 0.7      Absolute Eosinophils 0.0      Absolute Basophils 0.0      Absolute Myelocytes 0.1 (*)     RBC Morphology Confirmed RBC Indices      Platelet Assessment        Value: Automated Count Confirmed. Platelet morphology is normal.   LIPASE - Normal    Lipase 30     TROPONIN I - Normal    Troponin I 0.02     INFLUENZA A/B & SARS-COV2 PCR MULTIPLEX - Normal    Influenza A PCR Negative      Influenza B PCR Negative      RSV PCR Negative      SARS CoV2 PCR Negative         RADIOLOGY:  Reviewed all pertinent imaging. Please see official radiology report.  XR Chest Port 1 View   Final Result   IMPRESSION: Sternotomy. New mild bibasilar pulmonary infiltrates concerning for pneumonia.      CT Chest Pulmonary Embolism w Contrast    (Results Pending)              EKG:    Normal sinus rhythm 87, PVCs in a trigeminy and quadrigeminy pattern, questionable old anterior wall MI.  PVCs are new since April 2010.    I have independently reviewed and interpreted the EKG(s) documented above.    PROCEDURES:         I, Rustam Castro, am serving as a scribe to document services personally performed by Dr. Pool based on my observation and the provider's statements to me. I, Greg Pool MD attest that Rustam Castro is acting in a scribe capacity, has observed my performance of the services and  has documented them in accordance with my direction.    Greg Pool M.D.  Emergency Medicine  [facility] EMERGENCY ROOM  [facility] [facility] [id number] [phone]  Dept: [phone]     Greg Pool MD  11/01/22 1725       Greg Pool MD  11/01/22 1745

## 2022-11-02 NOTE — PLAN OF CARE
([id number])  Patient remains alert and oriented. He was wakeful majority of overnight due to frequently bending his left arm, which was causing the IV pump to frequently alarm. Peripheral IV's placed x2 due to due to IV incompatibility of maintenance fluids and scheduled antibiotics and per pt preference to minimize sleep interruptions caused by IV alarms. Remains on highflow nasal cannula, able to wean FiO2 to 70% while maintaining O2 sats above 92%. No MD order for code status present. Pt asked what his wishes for resuscitation are and he verbalized he wanted to be a Full Code. Chart Review reflects patient was full code during previous hospital admission. MD paged about code status. Defer to day MD.     Problem: Plan of Care - These are the overarching goals to be used throughout the patient stay.    Goal: Absence of Hospital-Acquired Illness or Injury  Outcome: Progressing  Intervention: Identify and Manage Fall Risk  Recent Flowsheet Documentation  Taken 11/2/2022 0000 by Naima Young, RN  Safety Promotion/Fall Prevention:    activity supervised    assistive device/personal items within reach    clutter free environment maintained    fall prevention program maintained    increased rounding and observation    lighting adjusted    nonskid shoes/slippers when out of bed    patient and family education    room near nurse's station    room organization consistent    safety round/check completed    treat reversible contributory factors    treat underlying cause  Intervention: Prevent Skin Injury  Recent Flowsheet Documentation  Taken 11/2/2022 0000 by Naima Young, RN  Body Position: position changed independently  Goal: Optimal Comfort and Wellbeing  Outcome: Progressing  Intervention: Monitor Pain and Promote Comfort  Recent Flowsheet Documentation  Taken 11/2/2022 0000 by Naima Young, RN  Pain Management Interventions:    declines    emotional support    environmental changes    quiet environment  facilitated  Intervention: Provide Person-Centered Care  Recent Flowsheet Documentation  Taken 11/2/2022 0000 by Naima Young, RN  Trust Relationship/Rapport:    care explained    choices provided    emotional support provided    empathic listening provided    questions answered    questions encouraged    reassurance provided    thoughts/feelings acknowledged     Problem: Risk for Delirium  Goal: Optimal Coping  Outcome: Progressing  Goal: Improved Sleep  Outcome: Progressing     Problem: Gas Exchange Impaired  Goal: Optimal Gas Exchange  Outcome: Progressing  Intervention: Optimize Oxygenation and Ventilation  Recent Flowsheet Documentation  Taken 11/2/2022 0000 by Naima Young, RN  Airway/Ventilation Management: airway patency maintained  Head of Bed (HOB) Positioning: HOB at 60-90 degrees   Goal Outcome Evaluation:

## 2022-11-02 NOTE — PROGRESS NOTES
[facility] MEDICINE PROGRESS NOTE      Zbigniew Alcocer is a 89 year old man with past medical history significant for coronary artery disease status post CABG, hypertension, GERD who presented to the hospital on 11/1/2022 with 2 weeks of productive cough and generalized weakness.  Currently being treated for sepsis due to aspiration pneumonia.        #Sepsis due to aspiration pneumonia  #Acute hypoxic respiratory failure due to pneumonia.  -2 weeks of productive cough.  -Reported coughing with food consumption.  -CTA ruled out pulmonary embolism.  It showed bilateral basilar infiltrate suggestive of aspiration pneumonia.  -Patient was started on vancomycin and Zosyn on admission.  -Initially he was on high flow nasal cannula 60 L 85% saturating around 90%.  -Elevated lactic acid which resolved with IV fluid.  -It appears that blood cultures were not sent in the emergency department.     Plan:  [] Send blood cultures.  [] Sputum culture.  [] Continue vancomycin and Zosyn.  [] Continue high flow nasal cannula and wean as possible. (Currently at 60 L 60%.)  [] If the patient is unable to tolerate liquid, will start IV fluid on slow rate.  [] Upgrade the patient to ICU status given high requirement of high flow nasal cannula.  [] Consults pulmonology (per nursing staff pulmonology consult indicated for patients on high oxygen requirement on high flow nasal cannula)  [] Follow-up with SLP evaluation given possible aspiration.  [] Keep n.p.o. for now until SLP evaluation.     #Hyperkalemia  -Hyperkalemic on presentation most likely in the setting of mild MARION due to decreased p.o. intake.  -Potassium level improved to 4.4.     Plan:  [] Continue to monitor.     #Transaminitis  #Elevated alkaline phosphate  -Bilirubin normal.  -Abdominal ultrasound unremarkable.  -Transaminitis improving.    Plan:  []  Continue to monitor.    #Hypertension  -At home on amlodipine 2.5 mg daily.  Held on  presentation.  -Blood pressures currently around 145/70.     Plan:  [] Continue to hold antihypertensive.       #History of coronary artery disease status post CABG  #Dyslipidemia  -At home on aspirin 81 mg daily, ezetimibe 10 mg daily, rosuvastatin 5 mg daily metoprolol succinate 50 mg twice daily, nitroglycerin sublingual as needed  -Held rosuvastatin ezetimibe on admission given transaminitis.  -Transthoracic echocardiogram on 11/1/2022 showed mild concentric left ventricular hypertrophy. Normal ejection fraction, no regional wall motion abnormalities. Moderate aortic sclerosis, mild tricuspid regurgitation.    Plan:  [] Continue aspirin 81 mg daily.  [] Continue to Hold rosuvastatin and ezetimibe.  [] Continue metoprolol succinate 25 mg twice daily.       #GERD     Plan:  [] Continue home pantoprazole.     #Spinal stenosis  -At home on acetaminophen/codeine     Plan:  [] Hold pain medication for now.    Diet: NPO for Medical/Clinical Reasons Except for: Meds, Ice Chips, Other; Specify: Little sips of water is okay per Dr. Lynch.  DVT Prophylaxis:  Enoxaparin (Lovenox) SQ  Code Status: Full Code    Anticipated possible discharge in 2 days to 3 once hypoxia resolves, SLP evaluation completed.      Disposition Plan      Expected Discharge Date: 11/06/2022                The patient's care was discussed with the Bedside Nurse and Patient.    LEONARDO LYNCH MD  [facility] Medicine  [facility]  Phone: #[phone]    Interval History/Subjective:  Patient continues to have productive cough. Continues to have some shortness of breath.  He denies any chest pain or lightheadedness.  He denies any back pain.  He denies any abdominal pain.    Physical Exam/Objective:  Temp:  [97.6  F (36.4  C)-99.3  F (37.4  C)] 97.8  F (36.6  C)  Pulse:  [] 81  Resp:  [10-29] 28  BP: ()/() 146/68  FiO2 (%):  [50 %-84 %] 60 %  SpO2:  [85 %-100 %] 97 %  Body mass index is 21.38  kg/m .    Physical Exam  Constitutional:       Appearance: He is ill-appearing.   HENT:      Mouth/Throat:      Mouth: Mucous membranes are dry.   Cardiovascular:      Rate and Rhythm: Normal rate and regular rhythm.      Heart sounds: Normal heart sounds. No murmur heard.  Pulmonary:      Effort: No respiratory distress.      Breath sounds: Rhonchi present. No wheezing.      Comments: On high flow nasal cannula.  Bibasilar crackles.  Abdominal:      General: Abdomen is flat.      Palpations: Abdomen is soft.      Tenderness: There is no abdominal tenderness.   Musculoskeletal:         General: No swelling.   Skin:     General: Skin is warm and dry.      Coloration: Skin is not pale.   Neurological:      General: No focal deficit present.      Mental Status: He is alert. Mental status is at baseline.   Psychiatric:         Mood and Affect: Mood normal.         Behavior: Behavior normal.         Data reviewed today: I personally reviewed all new medications, labs, imaging/diagnostics reports over the past 24 hours. Pertinent findings include:  -Potassium 4.4  -Lactic acid 2.2  -Creatinine 1.08.    Imaging:   Recent Results (from the past 24 hour(s))   XR Chest Port 1 View    Narrative    EXAM: XR CHEST PORT 1 VIEW  LOCATION: [facility]  DATE/TIME: 11/1/2022 4:32 PM    INDICATION: Short of breath, coughing  COMPARISON: None.      Impression    IMPRESSION: Sternotomy. New mild bibasilar pulmonary infiltrates concerning for pneumonia.   CT Chest Pulmonary Embolism w Contrast    Narrative    EXAM: CT CHEST PULMONARY EMBOLISM W CONTRAST  LOCATION: [facility]  DATE/TIME: 11/1/2022 7:27 PM    INDICATION: Short of breath, elevated D dimer  COMPARISON: None.  TECHNIQUE: CT chest pulmonary angiogram during arterial phase injection of IV contrast. Multiplanar reformats and MIP reconstructions were performed. Dose reduction techniques were used.   CONTRAST: 75 mL Isovue 370      FINDINGS:  ANGIOGRAM CHEST: No pulmonary embolism. Pulmonary arteries normal in caliber. Thoracic aorta normal in caliber. No aortic dissection.    HEART: Prior CABG procedure. There are 3 opacified bypass grafts. Left ventricle mildly dilated with abnormal wall thinning. Severe native coronary artery calcification.    MEDIASTINUM: No adenopathy or mass.    LUNGS AND PLEURA: Multisegment consolidation in the lower lobes bilaterally, dependently. Numerous tree-in-bud densities are present in the superior segment of the lower lobes bilaterally. Prominent mucoid impaction of the lower lobe airways is present.   Minimal groundglass density in the right upper lobe, as well as a few scattered tree-in-bud densities in the right middle lobe. No pleural effusion or pneumothorax.    LIMITED UPPER ABDOMEN: Negative.    MUSCULOSKELETAL: Healed sternotomy. No suspicious bone lesion.      Impression    IMPRESSION:  1.  No evidence for pulmonary embolism.   2.  Severe bilateral lower lobe pneumonia which may be secondary to aspiration, please correlate clinically.   US Abdomen Limited    Narrative    EXAM: US ABDOMEN LIMITED  LOCATION: [facility]  DATE/TIME: 11/1/2022 9:29 PM    INDICATION: Transaminitis with elevated alkaline phosphate.  COMPARISON: None.  TECHNIQUE: Limited abdominal ultrasound.    FINDINGS:    GALLBLADDER: Normal. No gallstones, wall thickening, or pericholecystic fluid. Negative sonographic Moreno's sign.    BILE DUCTS: No biliary dilatation. The common duct measures 3 mm. The duct is only partially visualized.    LIVER: Normal parenchyma with smooth contour. No focal mass.    RIGHT KIDNEY: No hydronephrosis.    PANCREAS: The limited visualized portions are normal.    No ascites.      Impression    IMPRESSION:  1.  Unremarkable abdominal ultrasound, bowel gas does limit visualization of liver and pancreas.       Echocardiogram Complete   Result Value    LVEF  65-70%    Narrative     [id number]  [id number]  [id number]  [id number]^CRIS^[place] [address]     Name: GALINA DRAPER  MRN: [id number]  : 1930  Study Date: 2022 08:15 AM  Age: 89 yrs  Gender: Male  Patient Location: [facility]  Reason For Study: CAD  Ordering Physician: LEONARDO LYNCH  Performed By: FESTUS     BSA: 1.9 m2  Height: 71 in  Weight: 153 lb  HR: 90  BP: 146/65 mmHg  ______________________________________________________________________________  Procedure  Complete Portable Echo Adult. Definity (NDC #80943-472) given intravenously.  Technically difficult study. Poor acoustic windows.  ______________________________________________________________________________  Interpretation Summary     There is mild concentric left ventricular hypertrophy.  The visual ejection fraction is 65-70%.  No regional wall motion abnormalities noted.  There is moderate trileaflet aortic sclerosis.  Limited doppler interrogation performed. Suspect some degree of aortic valve  stenosis.  There is mild (1+) tricuspid regurgitation.  There is no comparison study available.  ______________________________________________________________________________  Left Ventricle  The left ventricle is normal in size. There is mild concentric left  ventricular hypertrophy. The visual ejection fraction is 65-70%. No regional  wall motion abnormalities noted.     Right Ventricle  The right ventricle is not well visualized.     Atria  Normal left atrial size. Right atrial size is normal. Intact atrial septum.     Mitral Valve  Mitral valve leaflets appear normal. There is no evidence of mitral stenosis  or clinically significant mitral regurgitation.     Tricuspid Valve  Tricuspid valve leaflets appear normal. There is mild (1+) tricuspid  regurgitation. Right ventricle systolic pressure estimate normal.     Aortic Valve  There is moderate trileaflet aortic sclerosis. No aortic regurgitation is  present.  Limited doppler interrogation performed. Suspect some degree of  aortic valve stenosis.     Pulmonic Valve  The pulmonic valve is normal in structure and function.     Vessels  The aorta root is normal. Normal size ascending aorta.     Pericardium  There is no pericardial effusion.     ______________________________________________________________________________  MMode/2D Measurements & Calculations  IVSd: 1.3 cm  LVIDd: 4.0 cm  LVIDs: 2.6 cm  LVPWd: 1.1 cm  FS: 33.6 %     LV mass(C)d: 169.5 grams  LV mass(C)dI: 90.1 grams/m2  LA dimension: 3.5 cm  asc Aorta Diam: 3.4 cm  LA Volume (BP): 23.0 ml  LA Volume Index (BP): 12.2 ml/m2     LA Volume Indexed (AL/bp): 13.1 ml/m2  RWT: 0.57     Doppler Measurements & Calculations  MV E max seth: 79.0 cm/sec  MV A max seth: 112.0 cm/sec  MV E/A: 0.71  MV max P.6 mmHg  MV mean PG: 3.0 mmHg  MV V2 VTI: 23.7 cm  MV dec slope: 299.6 cm/sec2  MV dec time: 0.27 sec  LV V1 max P.4 mmHg  LV V1 max: 77.1 cm/sec  LV V1 VTI: 15.0 cm  PA V2 max: 87.7 cm/sec  PA max PG: 3.1 mmHg  PA mean P.5 mmHg  PA V2 VTI: 16.7 cm  PA acc time: 0.06 sec  TR max seth: 238.0 cm/sec  TR max P.7 mmHg  E/E': 13.9  E/E' avg: 15.8  Lateral E/e': 14.0  Medial E/e': 17.7     Peak E' Seth: 5.7 cm/sec     ______________________________________________________________________________  Report approved by: Corky Long 2022 11:14 AM             Labs:  Most Recent 3 CBC's:  Recent Labs   Lab Test 22  0907 22  1541 10/08/20  1210   WBC 11.7* 10.0 7.8   HGB 10.9* 13.0* 13.5*   MCV 98 96 99   * 585* 313     Most Recent 3 BMP's:  Recent Labs   Lab Test 22  0907 22  1541 21  1335    135* 141   POTASSIUM 4.4 5.2* 5.0   CHLORIDE 107 102 106   CO2 25 21* 26   BUN 26 26 15   CR 1.08 1.20 1.01   ANIONGAP 11 12 9   PLACIDO 8.9 9.2 9.5   GLC 77 139* 100       Medications:   Personally Reviewed.  Medications       ALPRAZolam  0.5 mg Oral QPM     aspirin  81 mg Oral  Daily     brimonidine  1 drop Right Eye BID     carboxymethylcellulose  1 drop Left Eye BID     enoxaparin ANTICOAGULANT  40 mg Subcutaneous Q24H     latanoprost  1 drop Right Eye At Bedtime     metoprolol succinate ER  25 mg Oral BID     pantoprazole  20 mg Oral Daily     piperacillin-tazobactam  3.375 g Intravenous Q8H     timolol maleate  1 drop Right Eye QAM     vancomycin  1,000 mg Intravenous Q24H

## 2022-11-02 NOTE — H&P
[facility] MEDICINE ADMISSION HISTORY AND PHYSICAL     Assessment & Plan       Zbigniew Alcocer is a 89 year old man with past medical history significant for coronary artery disease status post CABG, hypertension, GERD who presented to the hospital on 11/1/2022 with 2 weeks of productive cough and generalized weakness.  Currently being treated for sepsis due to pneumonia.      #Sepsis due to pneumonia  #Acute hypoxic respiratory failure due to pneumonia vs PE  -2 weeks of productive cough.  -Chest x-ray showed bilateral basilar infiltrates suggestive of pneumonia.  -Blood pressure dropped to around 90/50 in the emergency department with MAP around 70  -Patient meets sepsis criteria, tachycardic, tachypneic with suspected source of infection pneumonia.  Also he has elevated lactic acid.  -Received vancomycin and Zosyn in the ED.    Plan:  [] Follow-up with CTA chest.  [] Patient was given 500 cc of IV fluid with adequate blood pressure response.  [] Continue vancomycin and Zosyn.  [] Blood cultures.  [] Continue high flow nasal cannula and wean as possible.  [] LR at 150 for 12 hours (30 mL/kg) patient denies any history of cardiomyopathy.  [] Check lactic acid in 4 hours.  [] If the patient's blood pressure drops despite fluid resuscitation, will move to ICU.    #Hyperkalemia  -Potassium mildly elevated at 5.2.    Plan:  [] Continue to monitor.    #Transaminitis  #Elevated alkaline phosphate  -Bilirubin normal.  -No abdominal symptoms, nausea or vomiting.  -Transaminitis could be in the setting of sepsis, however, cannot rule out cholelithiasis.    Plan:  [] Abdominal ultrasound.    #Hypertension  -At home on amlodipine 2.5 mg daily    Plan:  [] Hold antihypertensive.    #History of coronary artery disease status post CABG  #Dyslipidemia  -At home on aspirin 81 mg daily, ezetimibe 10 mg daily, rosuvastatin 5 mg daily metoprolol succinate 50 mg twice daily, nitroglycerin sublingual as  needed    Plan:  [] Continue aspirin 81 mg daily.  [] Hold rosuvastatin and ezetimibe.  [] Continue metoprolol succinate 50 mg twice daily tomorrow  [] Echocardiogram.      #GERD    Plan:  [] Continue home pantoprazole.    #Spinal stenosis  -At home on acetaminophen/codeine    Plan:  [] Hold pain medication for now.        DVTP: Lovenox prophylactic dose   Code Status: No Order  Disposition: Inpatient   Expected LOS: 3+ days   Goals for the hospitalization: Further evaluation of acute hypoxic respiratory failure.  Improvement of oxygenation.  Disposition Plan Pending further evaluation     Expected Discharge Date: 11/03/2022                The patient's care was discussed with the Bedside Nurse, Patient and Patient's Family.  Chief Complaint Cough     HISTORY     Zbigniew Alcocer is a 89 year old man with past medical history significant for coronary artery disease status post CABG, hypertension, GERD who presented to the hospital on 11/1/2022 with 2 weeks of productive cough and generalized weakness.      Patient was in his usual state of health until around 2 weeks ago when he started to experience intermittent productive cough (clear to whitish sputum) associated with mild shortness of breath and chills.  He also reported generalized weakness which has worsened over the last week.  No fevers.  No sick contact.  Denies any chest pain, heart palpitation, or loss of consciousness.    Vitals on presentation: Blood pressure around 130/60, heart rate around 90, SPO2 around 80 on room air.  Patient was put on high flow nasal cannula 60 L / 85% with SPO2 around 95%.   Labs significant for potassium of 5.2, creatinine 1.2, AST and ALT elevated at around 140, alkaline phosphate elevated at 290,  and lactic acid 3.6, WBC 10, hemoglobin 13, D-dimer 3.9          Past Medical History     No past medical history on file.     Surgical History     Past Surgical History:   Procedure Laterality Date     BYPASS GRAFT  "ARTERY CORONARY  2003    3 vessel     CORONARY STENT PLACEMENT  2010     ESOPHAGOSCOPY, GASTROSCOPY, DUODENOSCOPY (EGD), COMBINED N/A 1/11/2019    Procedure: ESOPHAGOGASTRODUODENOSCOPY WITH DILATION;  Surgeon: John Olguin MD;  Location: [facility];  Service: Gastroenterology     TOTAL HIP ARTHROPLASTY Bilateral      ZZC CABG, VEIN, SINGLE      Description: CABG (CABG);  Proc Date: 10/30/2003;  Comments: LIMA to LAD, SVG to D1, SVG to PDA     Family History    Reviewed, and History reviewed. No pertinent family history.     Social History      Social History     Tobacco Use     Smoking status: Never     Smokeless tobacco: Never   Substance Use Topics     Alcohol use: Yes     Alcohol/week: 1.0 standard drink     Drug use: No     Allergies     Allergies   Allergen Reactions     Duloxetine Unknown     \"Loss of appetite, disoriented\"     Lisinopril Cough     Pravastatin Unknown     Dry mouth     Sulfa (Sulfonamide Antibiotics) [Sulfa Drugs] Unknown     Multiple side effects - sensitive hearing, headache, ect.      Xtampza Er [No Clinical Screening - See Comments] Unknown     Chills, night sweats     Prior to Admission Medications      Prior to Admission Medications   Prescriptions Last Dose Informant Patient Reported? Taking?   ALPRAZolam (XANAX) 0.5 MG tablet 10/31/2022  No Yes   Sig: [ALPRAZOLAM (XANAX) 0.5 MG TABLET] Take 1 tablet (0.5 mg total) by mouth every evening.   HEMP OIL OR EXTRACT OR OTHER CBD CANNABINOID, NOT MEDICAL CANNABIS, Past Week  Yes Yes   Sig: Apply 1 Application topically as needed Cbd cream   Methyl Salicylate 20 % GEL Past Week  Yes Yes   Sig: Externally apply 1 Application topically 3 times daily   acetaminophen-codeine (TYLENOL #3) 300-30 mg per tablet 11/1/2022 at 1400  No Yes   Sig: [ACETAMINOPHEN-CODEINE (TYLENOL #3) 300-30 MG PER TABLET] Take 1 tablet by mouth every 4 (four) hours as needed.   amLODIPine (NORVASC) 2.5 MG tablet 11/1/2022  Yes Yes   Sig: Take 2.5 mg by mouth daily "   amoxicillin (AMOXIL) 500 MG capsule More than a month  Yes Yes   Sig: Take 2,000 mg by mouth daily as needed (pre-dental)   aspirin 81 MG EC tablet 11/1/2022  Yes Yes   Sig: [ASPIRIN 81 MG EC TABLET] Take 81 mg by mouth daily.   brimonidine (ALPHAGAN) 0.2 % ophthalmic solution 11/1/2022 at am.  can bring  Yes Yes   Sig: [BRIMONIDINE (ALPHAGAN) 0.2 % OPHTHALMIC SOLUTION] Administer 1 drop to the right eye 2 (two) times a day.   carboxymethylcellulose PF (REFRESH PLUS) 0.5 % ophthalmic solution 11/1/2022 at can bring  Yes Yes   Sig: Place 1 drop Into the left eye 2 times daily   diphenhydrAMINE (BENADRYL) 25 MG capsule Past Week  Yes Yes   Sig: Take 25 mg by mouth nightly as needed for sleep   ezetimibe (ZETIA) 10 mg tablet 10/31/2022  Yes Yes   Sig: [EZETIMIBE (ZETIA) 10 MG TABLET] Take 5 mg by mouth every evening.    latanoprost (XALATAN) 0.005 % ophthalmic solution 10/31/2022 at can bring  Yes Yes   Sig: [LATANOPROST (XALATAN) 0.005 % OPHTHALMIC SOLUTION] Administer 1 drop to the right eye at bedtime.    metoprolol succinate ER (TOPROL XL) 50 MG 24 hr tablet 11/1/2022 at am  Yes Yes   Sig: Take 25 mg by mouth 2 times daily   nitroglycerin (NITROSTAT) 0.4 MG SL tablet PRN  Yes Yes   Sig: [NITROGLYCERIN (NITROSTAT) 0.4 MG SL TABLET] Place 0.4 mg under the tongue every 5 (five) minutes as needed for chest pain.   pantoprazole (PROTONIX) 20 MG tablet 11/1/2022 at am  Yes Yes   Sig: [PANTOPRAZOLE (PROTONIX) 20 MG TABLET] Take 20 mg by mouth daily.   rosuvastatin (CRESTOR) 5 MG tablet 10/31/2022  Yes Yes   Sig: [ROSUVASTATIN (CRESTOR) 5 MG TABLET] Take 5 mg by mouth bedtime.   timolol maleate (TIMOPTIC) 0.5 % ophthalmic solution 11/1/2022 at can bring  Yes Yes   Sig: Place 1 drop into the right eye every morning   trimethoprim (TRIMPEX) 100 MG tablet 11/1/2022 at am  Yes Yes   Sig: Take 100 mg by mouth daily   vitamins  A,C,E-zinc-copper (PRESERVISION AREDS) 14,320-101-200 unit-mg-unit cap 11/1/2022 at am  Yes Yes    Sig: [VITAMINS  A,C,E-ZINC-COPPER (PRESERVISION AREDS) 14,320-085-200 UNIT-MG-UNIT CAP] Take 1 tablet by mouth 2 (two) times a day.       Facility-Administered Medications: None      Review of Systems     A 12 point comprehensive review of systems was negative except as noted above in HPI.    PHYSICAL EXAMINATION       Vitals      Temp:  [99.3  F (37.4  C)] 99.3  F (37.4  C)  Pulse:  [] 93  Resp:  [10-22] 12  BP: (129-135)/(56-57) 135/57  FiO2 (%):  [84 %] 84 %  SpO2:  [85 %-98 %] 96 %    Examination   Physical Exam  Constitutional:       Appearance: He is ill-appearing and toxic-appearing.   HENT:      Mouth/Throat:      Mouth: Mucous membranes are moist.   Cardiovascular:      Rate and Rhythm: Regular rhythm. Tachycardia present.      Heart sounds: Normal heart sounds. No murmur heard.  Pulmonary:      Breath sounds: No wheezing.      Comments: Bibasilar crackles.  Chest:      Chest wall: No tenderness.   Abdominal:      General: Bowel sounds are normal. There is no distension.      Palpations: Abdomen is soft.   Musculoskeletal:         General: No swelling.   Skin:     General: Skin is warm and dry.   Neurological:      General: No focal deficit present.      Mental Status: He is alert.   Psychiatric:         Mood and Affect: Mood normal.         Behavior: Behavior normal.         Pertinent Lab     Most Recent 3 CBC's:Recent Labs   Lab Test 11/01/22  1541 10/08/20  1210 01/16/20  0804 01/15/20  1151   WBC 10.0 7.8  --  6.8   HGB 13.0* 13.5*  --  12.1*   MCV 96 99  --  99   * 313 194 178     Most Recent 3 BMP's:Recent Labs   Lab Test 11/01/22  1541 08/18/21  1335 10/08/20  1210   * 141 140   POTASSIUM 5.2* 5.0 4.9   CHLORIDE 102 106 107   CO2 21* 26 22   BUN 26 15 21   CR 1.20 1.01 0.88   ANIONGAP 12 9 11   PLACIDO 9.2 9.5 9.2   * 100 128*         Pertinent Radiology     Radiology Results:   Recent Results (from the past 24 hour(s))   XR Chest Port 1 View    Narrative    EXAM: XR CHEST  PORT 1 VIEW  LOCATION: [facility]  DATE/TIME: 11/1/2022 4:32 PM    INDICATION: Short of breath, coughing  COMPARISON: None.      Impression    IMPRESSION: Sternotomy. New mild bibasilar pulmonary infiltrates concerning for pneumonia.     Advance Care Planning        LEONARDO LYNCH MD  Hospitalist  [facility]  [facility]   Phone: #[phone]

## 2022-11-02 NOTE — PHARMACY-VANCOMYCIN DOSING SERVICE
"Pharmacy Vancomycin Initial Note  Date of Service 2022  Patient's  1930  89 year old, male    Indication: Community Acquired Pneumonia and Sepsis    Current estimated CrCl = Estimated Creatinine Clearance: 40.1 mL/min (based on SCr of 1.2 mg/dL).    Creatinine for last 3 days  2022:  3:41 PM Creatinine 1.20 mg/dL    Recent Vancomycin Level(s) for last 3 days  No results found for requested labs within last 72 hours.      Vancomycin IV Administrations (past 72 hours)                   vancomycin (VANCOCIN) 1,250 mg in 0.9% NaCl 250 mL intermittent infusion (mg) 1,250 mg New Bag 22 193                Nephrotoxins and other renal medications (From now, onward)    Start     Dose/Rate Route Frequency Ordered Stop    22 2100  vancomycin (VANCOCIN) 1,000 mg in 0.9% NaCl 250 mL intermittent infusion         1,000 mg  over 60 Minutes Intravenous EVERY 24 HOURS 22 1938      22 0000  piperacillin-tazobactam (ZOSYN) 3.375 g vial to attach to  mL bag        Note to Pharmacy: For [facility], [facility] and [facility]: For Zosyn-naive patients, use the \"Zosyn initial dose + extended infusion\" order panel.    3.375 g  over 240 Minutes Intravenous EVERY 8 HOURS 22 1900  vancomycin (VANCOCIN) 1,250 mg in 0.9% NaCl 250 mL intermittent infusion         1,250 mg  over 90 Minutes Intravenous ONCE 22 1840            Contrast Orders - past 72 hours (72h ago, onward)    Start     Dose/Rate Route Frequency Stop    22 193  iopamidol (ISOVUE-370) solution 75 mL         75 mL Intravenous ONCE 22 1923          [employer] Prediction of Planned Initial Vancomycin Regimen  Loading dose: 1250 mg IV x1.  Regimen: 1000 mg IV every 24 hours.  Start time: 19:37 on 2022  Exposure target: AUC24 (range)400-600 mg/L.hr   AUC24,ss: 503 mg/L.hr  Probability of AUC24 > 400: 76 %  Ctrough,ss: 16.1 mg/L  Probability of Ctrough,ss > 20: 28 %  Probability of nephrotoxicity (Lodise " JULIA 2009): 11 %          Plan:  1. Start vancomycin  1000 mg IV q24h.   2. Vancomycin monitoring method: AUC  3. Vancomycin therapeutic monitoring goal: 400-600 mg*h/L  4. Pharmacy will check vancomycin levels as appropriate in 1-3 Days.    5. Serum creatinine levels will be ordered daily for the first week of therapy and at least twice weekly for subsequent weeks.      Shalonda Roberts, [facility]

## 2022-11-02 NOTE — PROGRESS NOTES
Speech-Language Pathology: Clinical Swallow Evaluation     11/02/22 1400   Appointment Info   Signing Clinician's Name / Credentials (SLP) REECE Castillo   General Information   Onset of Illness/Injury or Date of Surgery 11/01/22   Pertinent History of Current Problem Zbigniew Alcocer is a 89 year old man with past medical history significant for coronary artery disease status post CABG, hypertension, GERD who presented to the hospital on 11/1/2022 with 2 weeks of productive cough and generalized weakness.  Currently being treated for sepsis due to pneumonia.   General Observations sleepy but woke up with bed being adjusted and pt very conversant. Son came in mid-session and provided valuable information re: hx dysphagia   Type of Evaluation   Type of Evaluation Swallow Evaluation   Oral Motor   Oral Musculature generally intact   Structural Abnormalities other (see comments)  (lesion on surface of tongue. Nurse notified)   Dentition (Oral Motor)   Dentition (Oral Motor) adequate dentition;poor condition   Facial Symmetry (Oral Motor)   Facial Symmetry (Oral Motor) WNL   Lip Function (Oral Motor)   Lip Range of Motion (Oral Motor) WNL   Lip Strength (Oral Motor) WNL   Tongue Function (Oral Motor)   Tongue Strength (Oral Motor) WNL   Tongue Coordination/Speed (Oral Motor) WNL   Tongue ROM (Oral Motor) WNL   Comment, Tongue Function (Oral Motor) lesion on surface of tongue. Nurse notified   Jaw Function (Oral Motor)   Jaw Function (Oral Motor) WNL   Cough/Swallow/Gag Reflex (Oral Motor)   Soft Palate/Velum (Oral Motor) WNL   Volitional Throat Clear/Cough (Oral Motor) WNL   Volitional Swallow (Oral Motor) effortful;mildly delayed   Vocal Quality/Secretion Management (Oral Motor)   Vocal Quality (Oral Motor) wet/gurgly;other (see comments)  (wet sounding after drinking sips of water)   Secretion Management (Oral Motor) WNL   General Swallowing Observations   Past History of Dysphagia Son/pt report esophageal  issues. Shatzki's ring. Sx's esophageal dysmotility with pt regurgitating food at times. He has to eat small bites and eat slowly to compensate. He has hx esophageal dilitations. Currently, nursing reports coughing/choking with water. Son reports pt coughing a lot.   Respiratory Support (General Swallowing Observations) other (see comments)  (HFNC 30L)   Current Diet/Method of Nutritional Intake (General Swallowing Observations, NIS) NPO   Swallowing Evaluation Clinical swallow evaluation   Clinical Swallow Evaluation   Feeding Assistance set up only required   Clinical Swallow Evaluation Textures Trialed thin liquids;pureed   Clinical Swallow Eval: Thin Liquid Texture Trial   Mode of Presentation, Thin Liquids cup;straw   Volume of Liquid or Food Presented a few sips   Oral Phase of Swallow WFL   Pharyngeal Phase of Swallow coughing/choking;wet vocal quality after swallow   Diagnostic Statement Pt showing consistent sx's aspiration with wet sounding voice and consistent throat clear or cough   Clinical Swallow Evaluation: Puree Solid Texture Trial   Mode of Presentation, Puree spoon   Volume of Puree Presented 2 small bites   Oral Phase, Puree WFL   Pharyngeal Phase, Puree wet vocal quality after swallow   Diagnostic Statement Wet sounding vocal quality with throat clear.   Esophageal Phase of Swallow   Patient reports or presents with symptoms of esophageal dysphagia Yes   Swallowing Recommendations   Diet Consistency Recommendations NPO;ice chips only;other (see comments)  (ok for small amount of applesauce with pills until VFSS can be compeleted)   Instrumental Assessment Recommendations VFSS (videofluoroscopic swallowing study)   Comment, Swallowing Recommendations NPO except ice chips and if necessary, pills with small amount of applesauce   Clinical Impression   Criteria for Skilled Therapeutic Interventions Met (SLP Eval) Yes, treatment indicated   Clinical Impression Comments Pt showing overt sx's  aspiration with small sips of thin and small amount of puree. Pt/son report long hx esophageal dysphagia with sx's coughing with po intake. Question pharyngeal dysphagia as well. VFSS recommended and will be completed tomorrow.   SLP Total Evaluation Time   Eval: oral/pharyngeal swallow function, clinical swallow Minutes (23432) 30   SLP Discharge Planning   SLP Plan VFSS Thurs   Total Session Time   Total Session Time (sum of timed and untimed services) 30

## 2022-11-02 NOTE — PLAN OF CARE
Major Shift Events:  pt remains on HFNC, titration accordingly. NPO w ice chips per speech. Echo completed.   Plan: Continue to monitor/tx c abx. Formal swallow study 11/3.   For vital signs and complete assessments, please see documentation flowsheets.     Pts son Ramirez Alcocer [phone] would like to speak w care manager regarding his concerns for pts care at home, and feels pt may benefit from skilled care at time of discharge.

## 2022-11-02 NOTE — PROGRESS NOTES
"/54   Pulse 81   Temp 97.7  F (36.5  C) (Axillary)   Resp 29   Ht 1.803 m (5' 11\")   Wt 69.5 kg (153 lb 4.8 oz)   SpO2 93%   BMI 21.38 kg/m      Pt on HFNC throughout the day. Beginning of shift at 60L and 70%; SpO2 >96% when seen; weaned HFNC down in the afternoon to 40L and 50%. Pt sleeping/resting, A&O, and WOB seems at baseline when assessed. Will continue to monitor and assess pt.     Viktor Tillman, RT on 11/2/2022 at 5:14 PM    "

## 2022-11-02 NOTE — PROGRESS NOTES
"/63 (BP Location: Right arm)   Pulse 90   Temp 98  F (36.7  C) (Axillary)   Resp 15   Ht 1.803 m (5' 11\")   Wt 72.1 kg (159 lb)   SpO2 98%   BMI 22.18 kg/m        The PT arrived from the ER were he was maintained on a HFNC. Currently, he is on 60 liters and I have just weaned the FIO2 from ~80% to ~70%. SAT's are in the high 90's. RT will continue to follow with current MD orders.  "

## 2022-11-03 NOTE — PLAN OF CARE
Problem: Sepsis/Septic Shock  Goal: Optimal Nutrition Intake  Outcome: Not Progressing     Problem: Gas Exchange Impaired  Goal: Optimal Gas Exchange  Outcome: Progressing    Patient A&O.  Lethargic this AM but improved this afternoon.  On HFNC 40L 55%.  Sats low 90s.  Continues with weak cough.  PRN oral suctioning.  Failed video swallow today.  Remains NPO with ice chips only.  Frequently requesting them d/t c/o dry mouth.  Biotene rinse ordered and given.  BG remains in 80s.  D5NS infusing.  External catheter in place.  No BM.  MD aware.  Chronic back pain.  Unable to give T3 d/t NPO status.  Son states they've tried multiple pain modalities with T3 being the only relief.  MD paged and lidocaine patch ordered.  [id number] to maintain skin integrity. Palliative will meet with patient and children tomorrow at 1000.  Continue to closely monitor.   Pt presents to the ER via private vehicle with c/o head, arm injury and s/p fell down a hill when going to retrieve a volleyball, reports LOC, unknown how long. Wife reports patient laid down to go to bed, wife woke patient up and he was unable to recall how many children he had, where he worked, and slow to respond. Pt struck tree with left arm. Pt denies blurred vision, N/V/D.      Nay Cool RN  05/20/19 [id number]

## 2022-11-03 NOTE — PROGRESS NOTES
[facility] MEDICINE PROGRESS NOTE      Zbigniew Alcocer is a 89 year old man with past medical history significant for coronary artery disease status post CABG, hypertension, GERD who presented to the hospital on 11/1/2022 with 2 weeks of productive cough and generalized weakness.  Currently being treated for sepsis due to aspiration pneumonia.  Underwent swallow evaluation on 11/3 he has a very weak cough and recurrent aspiration due to pharyngeal stasis.  Patient is kept NPO.         #Sepsis due to aspiration pneumonia  #Acute hypoxic respiratory failure due to pneumonia.  -2 weeks of productive cough.  -Reported coughing with food consumption.  -CTA ruled out pulmonary embolism.  It showed bilateral basilar infiltrate suggestive of aspiration pneumonia.  -Patient was started on vancomycin and Zosyn on admission.  -Initially he was on high flow nasal cannula 60 L 85% saturating around 90%.  Wean down over the last 2 days and currently at 40 L / 55%  -Elevated lactic acid which resolved with IV fluid.  -Blood cultures remain negative to date.  -Sputum cultures Limited study.  -Pulmonology on board.     Plan:  [] Follow-up of blood cultures.  [] recollect sputum culture if possible.  [] Continue vancomycin and Zosyn.  [] Wean off high flow nasal cannula as tolerated.  [] Patient is critically ill and given his dysphagia and respiratory status, pulm placed a consult for palliative team for goals of care.      #Dysphagia  -History dysphagia 2/2 esophageal stricture s/p dilation.  -Reported that over the last 2 to 3 weeks he has been having difficulty with swallowing with recurrent cough.  -SLP evaluation today 11/3/2022 showed oropharyngeal dysphagia with recurrent aspiration.  Recommended keeping the patient n.p.o.  -Dysphagia cause at this point is unclear, possibly in the setting of acute illness.  -Patient was hypoglycemic overnight and therefore he is currently on D5 normal saline  at 25 cc/h.    Plan:  [] Continue D5 normal saline at 25 cc/h.  [] Consults gastroenterology.  [] Keep patient NPO.  [] If the patient's swallowing did not improve over the next 48 hours, will consider alternative means of nutrition.  [] Pulmonary team has switched his oral medications to IV.       #Hyperkalemia  -Resolved.    Plan:  [] Continue to monitor.     #Transaminitis  #Elevated alkaline phosphate  -Resolving..    Plan:  []  Continue to monitor.    #Hypertension  -At home on amlodipine 2.5 mg daily.  Held on presentation.  -Blood pressures currently around 150/70.     Plan:  [] Continue to hold antihypertensive.       #History of coronary artery disease status post CABG  #Dyslipidemia  -At home on aspirin 81 mg daily, ezetimibe 10 mg daily, rosuvastatin 5 mg daily metoprolol succinate 50 mg twice daily, nitroglycerin sublingual as needed  -Held rosuvastatin ezetimibe on admission given transaminitis.  -Transthoracic echocardiogram on 11/1/2022 showed mild concentric left ventricular hypertrophy. Normal ejection fraction, no regional wall motion abnormalities. Moderate aortic sclerosis, mild tricuspid regurgitation.    Plan:  [] Hold aspirin given n.p.o. status.  [] Continue to Hold rosuvastatin and ezetimibe.  []  Metoprolol succinate 25 mg was switched to metoprolol IV 2.5 mg every 6 hours.     #GERD  -At home on pantoprazole.     Plan:  []  Switch to oral pantoprazole to IV PPI.       #Spinal stenosis  -At home on acetaminophen/codeine     Plan:  [] Hold pain medication for now.    Diet: NPO for Medical/Clinical Reasons Except for: Meds, Ice Chips, Other; Specify: limited ice chips for comfort after thorough oral cares have been completed per Speech  DVT Prophylaxis:  Enoxaparin (Lovenox) SQ  Code Status: Full Code    Anticipated possible discharge in 3 days to 5 once hypoxia resolves, GI evaluation completed, dysphagia improves or alternative nutrition plans.    Disposition Plan Pending further  evaluation.     Expected Discharge Date: 11/06/2022      Destination: home with family;home with help/services  Discharge Comments: hf O2        The patient's care was discussed with the Bedside Nurse and Patient.    LEONARDO LYNCH MD  [facility]  Phone: #[phone]    Interval History/Subjective:  Patient feels more tired today.  He continues to have cough and mild shortness of breath.  He denies any chest pain.  He denies any nausea or vomiting.  He denies any fever or chills.      Physical Exam/Objective:  Temp:  [97.3  F (36.3  C)-98.4  F (36.9  C)] 98.4  F (36.9  C)  Pulse:  [59-85] 69  Resp:  [12-35] 18  BP: ()/(52-69) 156/69  FiO2 (%):  [40 %-55 %] 55 %  SpO2:  [88 %-99 %] 92 %  Body mass index is 19.9 kg/m .    Physical Exam  Constitutional:       Appearance: He is ill-appearing.      Comments: Appears slightly more lethargic than yesterday.   HENT:      Mouth/Throat:      Mouth: Mucous membranes are dry.   Cardiovascular:      Rate and Rhythm: Normal rate and regular rhythm.      Heart sounds: Normal heart sounds. No murmur heard.  Pulmonary:      Effort: No respiratory distress.      Breath sounds: Rhonchi present. No wheezing.      Comments: On high flow nasal cannula.  Bibasilar crackles.  Abdominal:      General: Abdomen is flat.      Palpations: Abdomen is soft.      Tenderness: There is no abdominal tenderness.   Musculoskeletal:         General: No swelling.   Skin:     General: Skin is warm and dry.      Coloration: Skin is not pale.   Neurological:      General: No focal deficit present.      Mental Status: He is alert.   Psychiatric:         Mood and Affect: Mood normal.         Behavior: Behavior normal.         Data reviewed today: I personally reviewed all new medications, labs, imaging/diagnostics reports over the past 24 hours. Pertinent findings include:      Imaging:   Recent Results (from the past 24 hour(s))   XR Video  Swallow with SLP or OT    Narrative    EXAM: XR VIDEO SWALLOW WITH SLP OR OT  LOCATION: [facility]  DATE/TIME: 11/3/2022 10:14 AM    INDICATION: Difficulty swallowing.  COMPARISON: None.    TECHNIQUE: Routine swallow study with speech pathology using multiple barium thicknesses.    FINDINGS:   FLUOROSCOPIC TIME: 2.37 minutes  NUMBER OF IMAGES: 6    Swallow study with Speech Pathology using multiple barium thicknesses.     Oral phase appeared normal.  Posterior epiglottic motion.  Delayed swallow reflex.  These abnormalities cause significant vallecular and piriform sinus stasis, particularly for the puree.  There is thin barium aspiration with a weak cough. Aspiration also occurred following the puree swallow, likely due to pour over or kick back from the vallecular or piriform sinus stasis.      Impression    IMPRESSION:  1.  Thin barium aspiration with weak cough.  2.  Additional aspiration secondary to the multicomponent barium stasis         Labs:  Most Recent 3 CBC's:  Recent Labs   Lab Test 11/03/22  0451 11/02/22  0907 11/01/22  1541   WBC 15.2* 11.7* 10.0   HGB 10.2* 10.9* 13.0*    98 96    468* 585*     Most Recent 3 BMP's:  Recent Labs   Lab Test 11/03/22  1300 11/03/22  1139 11/03/22  0738 11/03/22  0615 11/03/22  0451 11/02/22  0907 11/01/22  1541   NA  --   --   --   --  144 143 135*   POTASSIUM  --   --   --   --  3.8 4.4 5.2*   CHLORIDE  --   --   --   --  111* 107 102   CO2  --   --   --   --  23 25 21*   BUN  --   --   --   --  27 26 26   CR  --   --   --   --  0.85 1.08 1.20   ANIONGAP  --   --   --   --  10 11 12   PLACIDO  --   --   --   --  8.4* 8.9 9.2   GLC 80 106* 161*   < > 59* 77 139*    < > = values in this interval not displayed.       Medications:   Personally Reviewed.  Medications     dextrose 5% and 0.9% NaCl 25 mL/hr at 11/03/22 1303       [Held by provider] ALPRAZolam  0.5 mg Oral QPM     [Held by provider] aspirin  81 mg Oral Daily      brimonidine  1 drop Right Eye BID     carboxymethylcellulose  1 drop Left Eye BID     enoxaparin ANTICOAGULANT  40 mg Subcutaneous Q24H     influenza vac high-dose quad  0.7 mL Intramuscular Prior to discharge     latanoprost  1 drop Right Eye At Bedtime     metoprolol  2.5 mg Intravenous Q6H     [Held by provider] metoprolol succinate ER  25 mg Oral BID     [Held by provider] pantoprazole  20 mg Oral Daily     [START ON 11/4/2022] pantoprazole  40 mg Intravenous Daily with breakfast     piperacillin-tazobactam  3.375 g Intravenous Q8H     timolol maleate  1 drop Right Eye QAM     vancomycin  1,000 mg Intravenous Q24H

## 2022-11-03 NOTE — PROGRESS NOTES
[facility] - Palliative Care Note      Palliative care consult noted.  Discussed with bedside RN, RIAZ CROW and patient's son.  Patient will be seen for consultation tomorrow at 10 am with son and daughter at 10 am. Please call if any questions.   Thank you,   MAGDI Hernandez, GCNS-BC  Clinical Nurse Specialist  [facility]  [phone]

## 2022-11-03 NOTE — PROGRESS NOTES
"BP 96/53   Pulse 59   Temp 97.8  F (36.6  C) (Oral)   Resp 24   Ht 1.803 m (5' 11\")   Wt 69.5 kg (153 lb 4.8 oz)   SpO2 95%   BMI 21.38 kg/m        The PT was maintained on the HFNC at 40 liters at 50% with SAT's typically in the low to mid 90s. RT will continue to follow with current MD orders.  "

## 2022-11-03 NOTE — PROGRESS NOTES
"/60   Pulse 82   Temp 99.7  F (37.6  C) (Axillary)   Resp 24   Ht 1.803 m (5' 11\")   Wt 64.7 kg (142 lb 11.2 oz)   SpO2 91%   BMI 19.90 kg/m      Pt on continuous HFNC throughout day shift. Weaning attempted in the AM to 40L 40% but required more oxygen support to maintain SpO2 >90%. Currently on 40L 55%. Pt had been sleeping when seen; able to arouse and wake up when directed. ABG ordered and collected. Will continue with current therapy and await further cares.    Viktor Tillman, RT on 11/3/2022 at 5:27 PM    "

## 2022-11-03 NOTE — CONSULTS
"CRITICAL CARE CONSULT:    Admitted 11/1/2022  3:26 PM    CCx: Bilateral lower lobe pneumonia    HPI: Zbigniew Alcocer is a 89yo male with PMH significant for CAD s/p CABG, HTN, GERD, and dysphagia 2/2 esophageal stricture s/p dilation who presented on 11/1/22 with dyspnea and productive cough. In ED, , T99.3, O2 85% on RA that improved to 95% on HFNC. Became hypotensive, which resolved with IV fluids, CXR showing new bibasilar pulmonary infiltrates concerning for pneumonia, lactate 3.6, K 5.2, elevated ALT/AST/alk phos, respiratory panel negative, abdominal US negative. D-dimer 3.91; CT PE negative for PE but showed several bilateral LL pneumonia. Pt started on vanc/Zosyn and admitted. Is full code.     Past Medical History:  1. CAD s/p CABG - PTA ASA, ezetimibe, rosuvastatin, metoprolol, nitroglycerin  2. Chronic dysphagia 2/2 esophageal stricture s/p esophageal dilation - per chart review, follows with [facility] in past and has had multiple EGDs with dilations  3. GERD  4. Spinal stenosis - on Tylenol #4  5. Hypertension  6. Anemia    Past Surgical History:  1. CABG x 3 2003  2. Total L hip arthroplasty 1/2020  3. EGD with esophageal dilation, last in 1/2019    Social History:  Marital status:   Number of children: son Ramirez    Allergies:  Allergies   Allergen Reactions     Duloxetine Unknown     \"Loss of appetite, disoriented\"     Lisinopril Cough     Pravastatin Unknown     Dry mouth     Sulfa (Sulfonamide Antibiotics) [Sulfa Drugs] Unknown     Multiple side effects - sensitive hearing, headache, ect.      Xtampza Er [No Clinical Screening - See Comments] Unknown     Chills, night sweats     MAR Reviewed      ICU DAILY CHECKLIST             Can patient transfer out of MICU? no    FAST HUG:    Feeding:  Feeding: no.  Patient is receiving NPO    Mello:yes  Analgesia/Sedation:no  Thromboembolic prophylaxis: Yes; Mode:  Lovenox  HOB>30:  Yes  Stress Ulcer Protocol Active: No; Mode: on PTA " "pantoprazole  Glycemic Control: Any glucose > 180 no; Mode of Insulin Therapy: Not indicated    PHYSICAL THERAPY AND MOBILITY:  Can patient have PT and mobility trial: yes  Activity: as tolerated with assist    Physical Exam:  Vent settings for last 24 hours:  FiO2 (%): 55 %  Resp: 25      BP (!) 142/65   Pulse 64   Temp 98.4  F (36.9  C) (Axillary)   Resp 25   Ht 1.803 m (5' 11\")   Wt 64.7 kg (142 lb 11.2 oz)   SpO2 94%   BMI 19.90 kg/m      Intake/Output last 3 shifts:  I/O last 3 completed shifts:  In: 550 [P.O.:200; I.V.:350]  Out: 525 [Urine:525]  Intake/Output this shift:  I/O this shift:  In: 155.83 [I.V.:155.83]  Out: 200 [Urine:200]    Physical Exam:  General: NAD, lying in bed, sleepy but wakes to voice/touch and answers some questions  CV: RRR, no LE edema bilaterally  Resp: limited due to patient sleepiness; upper lobes clear to auscultation anteriorly  GI: soft, nondistended, some epigastric tenderness to palpation per pt    Lines/Drains/Tubes:  Peripheral IV 11/01/22 Left Upper arm (Active)   Site Assessment [facility] 11/03/22 1000   Line Status Saline locked 11/03/22 1000   Dressing Intervention New dressing  11/01/22 2000   Phlebitis Scale 0-->no symptoms 11/03/22 1000   Infiltration Scale 0 11/03/22 1000   Infiltration Site Treatment Method  None 11/03/22 0200   Number of days: 2       Peripheral IV 11/02/22 Anterior;Left Lower forearm (Active)   Site Assessment [facility] 11/03/22 1000   Line Status Saline locked 11/03/22 1000   Dressing Intervention New dressing  11/02/22 0400   Phlebitis Scale 0-->no symptoms 11/03/22 1000   Infiltration Scale 0 11/03/22 1000   Infiltration Site Treatment Method  None 11/03/22 0200   Number of days: 1       Peripheral IV 11/02/22 Anterior;Left Upper arm (Active)   Site Assessment [facility] 11/03/22 1000   Line Status Infusing 11/03/22 1000   Dressing Intervention New dressing  11/02/22 0330   Phlebitis Scale 0-->no symptoms 11/03/22 1000   Infiltration Scale 0 11/03/22 1000 "   Infiltration Site Treatment Method  None 11/03/22 0200   Number of days: 1       LAB:  ROUTINE ICU LABS (Last four results)  CMP  Recent Labs   Lab 11/03/22  1139 11/03/22  0738 11/03/22  0635 11/03/22  0615 11/03/22  0451 11/02/22  0907 11/01/22  1541   NA  --   --   --   --  144 143 135*   POTASSIUM  --   --   --   --  3.8 4.4 5.2*   CHLORIDE  --   --   --   --  111* 107 102   CO2  --   --   --   --  23 25 21*   ANIONGAP  --   --   --   --  10 11 12   * 161* 129* 79 59* 77 139*   BUN  --   --   --   --  27 26 26   CR  --   --   --   --  0.85 1.08 1.20   GFRESTIMATED  --   --   --   --  82 64 57*   PLACIDO  --   --   --   --  8.4* 8.9 9.2   MAG  --   --   --   --   --  1.9 2.0   PHOS  --   --   --   --   --  4.0  --    PROTTOTAL  --   --   --   --  5.0* 5.5* 6.9   ALBUMIN  --   --   --   --  1.1* 1.3* 1.7*   BILITOTAL  --   --   --   --  0.5 0.8 0.9   ALKPHOS  --   --   --   --  202* 231* 291*   AST  --   --   --   --  109* 103* 134*   ALT  --   --   --   --  88* 105* 148*     CBC  Recent Labs   Lab 11/03/22  0451 11/02/22  0907 11/01/22  1541   WBC 15.2* 11.7* 10.0   RBC 3.34* 3.62* 4.31*   HGB 10.2* 10.9* 13.0*   HCT 33.5* 35.6* 41.2    98 96   MCH 30.5 30.1 30.2   MCHC 30.4* 30.6* 31.6   RDW 13.9 13.7 13.5    468* 585*     INRNo lab results found in last 7 days.  Arterial Blood Gas  Recent Labs   Lab 11/03/22  0921   PH 7.43   PCO2 41   PO2 71*   HCO3 27   O2PER 55       MICRO:  - On vanc/Zosyn since 11/1  - BC x 2 NGTD  - Respiratory panel negative    IMAGING:  CXR 11/1:  IMPRESSION: Sternotomy. New mild bibasilar pulmonary infiltrates concerning for pneumonia.    CT PE 11/1:  IMPRESSION:  1.  No evidence for pulmonary embolism.   2.  Severe bilateral lower lobe pneumonia which may be secondary to aspiration, please correlate clinically.    Abdominal US 11/1:  IMPRESSION:  1.  Unremarkable abdominal ultrasound, bowel gas does limit visualization of liver and pancreas.    Video swallow study  11/3:  IMPRESSION:  1.  Thin barium aspiration with weak cough.  2.  Additional aspiration secondary to the multicomponent barium stasis    Assessment/Plan:  Zbigniew Alcocer is a 89 year old male with a past medical history significant for CAD s/p CABG, HTN, GERD, and dysphagia 2/2 esophageal stricture s/p dilation presenting to the hospital for severe sepsis 2/2 bilateral lower lobe pneumonia. The likely etiology of pneumonia is aspiration given significant hx of dysphagia.    1. NEURO: Not sedated. Drowsy but wakes with voice/touch and answering questions appropriately.  2. CVS: Has a known medical history of CAD s/p CABG x3; additionally has HTN and hyperlipidemia. Initial hypotension resolved with IV fluids; likely 2/2 sepsis.    Holding PTA amlodipine, rosuvastatin, ezetimibe    Hold ASA for now given NPO status    Switch PO metoprolol to IV 2.5 mg q6h    On telemetry  3. RESP: Admitted with hypoxia requiring HFNC; oxygen needs decreasing. Hypoxia 2/2 bilateral lower lobe pneumonia likely due to aspiration.    Maintain SpO2 of >92%.  FiO2 (%): 55 %  Resp: 25    4. GI: Prior history of GERD and significant dysphagia 2/2 esophageal strictures and s/p dilation. Followed with [facility]. Suspect dysphagia causing aspiration that may have led to bilateral pneumonia. Last BM unknown. Failed video swallow study with SLP today.    Continue PTA Protonix - switch to IV given NPO status    SLP consulted, appreciate recs and cares    Continue NPO; recommend consideration of alternative means of nutrition    TCU or home care with speech therapy  5. RENAL: No MARION.    Follow Cr    Follow electrolytes and correct as abnormalities arise.  6. ID: Presented with hypoxia and productive cough; CT PE showing bilateral LL pneumonia. Suspect etiology is aspiration given significant hx of dysphagia and pt failing video swallow study today. On vanc/Zosyn - O2 needs slightly improved today but WBC trending up. If WBC continues to trend up  tomorrow, consider switching Zosyn to meropenem.    BC pending    Continue vanc/Zosyn    Trend CBC    NPO as above    Pt is full code - given dysphagia, will consult Palliative for goals of care  7. HEMATOLOGIC: Per chart review, has hx of anemia but no notes of workup. Baseline Hgb seems to be 12-13. Hgb is downtrending during admission.    Monitor CBC  8. ENDOCRINE: D5 + NS for hypoglycemia. Decreased to 25 ml/hr as BG 160s today.  9. ICU PROPHYLAXIS: Protonix, Lovenox.  10. DISPO: Unclear. Continues to require ICU levels of care given HFNC needs.    Consult Palliative for goals of care    Clinically Significant Risk Factors Present on Admission                     Total Critical Care Time : 45 Minutes    Mariza Huber MD PGY-1  Pulmonary and Critical Care

## 2022-11-03 NOTE — CONSULTS
GI CONSULT NOTE      Name: Zbigniew Alcocer  Medical Record #: [id number]  YOB: 1930  Date of Admission: 11/1/2022  Date/Time: 11/3/2022/2:35 PM     CHIEF COMPLAINT:     HISTORY OF PRESENT ILLNESS: We were asked to see Zbigniew Alcocer by Dr. Dumont for dysphagia. History obtained from chart review, son and patient.     Zbigniew Alcocer is a 89 year old year old male with history of CAD s/p CABG x3, HTN, GERD, esophageal stricture who was admitted on 11/1/22 with dyspnea and productive cough. Imaging consistent with severe bilateral LL pneumonia, in ICU on IV Zosyn or vancomycin. On high flow nasal canula 40L/55%. Lactate elevated, now normal. Blood cultures negative.     In addition, he reports 2-3 weeks history of dysphagia associated with his cough. He was seen by SLP, VFSS showed oropharyngeal dysphagia with significant pharyngeal stasis present after swallow. SLP recommend NPO and palliative care consult to clarify goals of care.     He does have a history of distal esophageal stricture and has undergone 2 EGDs with our group (2004 and 2019). EGD 1/11/19 showed distal esophageal stricture s/p dilation with 58 F savary dilator. He did well for several months after that EGD, but then slowly had recurrence of esophageal dysphagia symptoms including feeling of solid food getting stuck in esophagus and then vomiting. He was able to improve symptoms by taking small bites and chewing carefully. More recently over the past few months, he has had coughing, choking when eating, especially if he tried to talk while eating.     REVIEW OF SYSTEMS (ROS): Complete review of systems negative other than listed in HPI.    PAST MEDICAL HISTORY:  CAD  CABG  HTN  GERD    FAMILY HISTORY:  History reviewed. No pertinent family history.    SOCIAL HISTORY:  Social History     Socioeconomic History     Marital status:      Spouse name: Not on file     Number of children: Not on file     Years of  education: Not on file     Highest education level: Not on file   Occupational History     Not on file   Tobacco Use     Smoking status: Never     Smokeless tobacco: Never   Substance and Sexual Activity     Alcohol use: Yes     Alcohol/week: 1.0 standard drink     Drug use: No     Sexual activity: Not on file   Other Topics Concern     Not on file   Social History Narrative     Not on file     Social Determinants of Health     Financial Resource Strain: Not on file   Food Insecurity: Not on file   Transportation Needs: Not on file   Physical Activity: Not on file   Stress: Not on file   Social Connections: Not on file   Intimate Partner Violence: Not on file   Housing Stability: Not on file     MEDICATIONS PRIOR TO ADMISSION:   Medications Prior to Admission   Medication Sig Dispense Refill Last Dose     acetaminophen-codeine (TYLENOL #3) 300-30 mg per tablet [ACETAMINOPHEN-CODEINE (TYLENOL #3) 300-30 MG PER TABLET] Take 1 tablet by mouth every 4 (four) hours as needed. 22 tablet 0 11/1/2022 at 1400     ALPRAZolam (XANAX) 0.5 MG tablet [ALPRAZOLAM (XANAX) 0.5 MG TABLET] Take 1 tablet (0.5 mg total) by mouth every evening. 30 tablet 0 10/31/2022     amLODIPine (NORVASC) 2.5 MG tablet Take 2.5 mg by mouth daily   11/1/2022     amoxicillin (AMOXIL) 500 MG capsule Take 2,000 mg by mouth daily as needed (pre-dental)   More than a month     aspirin 81 MG EC tablet [ASPIRIN 81 MG EC TABLET] Take 81 mg by mouth daily.   11/1/2022     brimonidine (ALPHAGAN) 0.2 % ophthalmic solution [BRIMONIDINE (ALPHAGAN) 0.2 % OPHTHALMIC SOLUTION] Administer 1 drop to the right eye 2 (two) times a day.   11/1/2022 at am.  can bring     carboxymethylcellulose PF (REFRESH PLUS) 0.5 % ophthalmic solution Place 1 drop Into the left eye 2 times daily   11/1/2022 at can bring     diphenhydrAMINE (BENADRYL) 25 MG capsule Take 25 mg by mouth nightly as needed for sleep   Past Week     ezetimibe (ZETIA) 10 mg tablet [EZETIMIBE (ZETIA) 10 MG  "TABLET] Take 5 mg by mouth every evening.    10/31/2022     HEMP OIL OR EXTRACT OR OTHER CBD CANNABINOID, NOT MEDICAL CANNABIS, Apply 1 Application topically as needed Cbd cream   Past Week     latanoprost (XALATAN) 0.005 % ophthalmic solution [LATANOPROST (XALATAN) 0.005 % OPHTHALMIC SOLUTION] Administer 1 drop to the right eye at bedtime.   1 10/31/2022 at can bring     Methyl Salicylate 20 % GEL Externally apply 1 Application topically 3 times daily   Past Week     metoprolol succinate ER (TOPROL XL) 50 MG 24 hr tablet Take 25 mg by mouth 2 times daily   11/1/2022 at am     nitroglycerin (NITROSTAT) 0.4 MG SL tablet [NITROGLYCERIN (NITROSTAT) 0.4 MG SL TABLET] Place 0.4 mg under the tongue every 5 (five) minutes as needed for chest pain.   PRN     pantoprazole (PROTONIX) 20 MG tablet [PANTOPRAZOLE (PROTONIX) 20 MG TABLET] Take 20 mg by mouth daily.   11/1/2022 at am     rosuvastatin (CRESTOR) 5 MG tablet [ROSUVASTATIN (CRESTOR) 5 MG TABLET] Take 5 mg by mouth bedtime.   10/31/2022     timolol maleate (TIMOPTIC) 0.5 % ophthalmic solution Place 1 drop into the right eye every morning  12 11/1/2022 at can bring     trimethoprim (TRIMPEX) 100 MG tablet Take 100 mg by mouth daily   11/1/2022 at am     vitamins  A,C,E-zinc-copper (PRESERVISION AREDS) [phone] unit-mg-unit cap [VITAMINS  A,C,E-ZINC-COPPER (PRESERVISION AREDS) [phone] UNIT-MG-UNIT CAP] Take 1 tablet by mouth 2 (two) times a day.    11/1/2022 at am        ALLERGIES: Duloxetine, Lisinopril, Pravastatin, Sulfa (sulfonamide antibiotics) [sulfa drugs], and Xtampza er [no clinical screening - see comments]    PHYSICAL EXAM:    /57   Pulse 66   Temp 98.4  F (36.9  C) (Axillary)   Resp 18   Ht 1.803 m (5' 11\")   Wt 64.7 kg (142 lb 11.2 oz)   SpO2 93%   BMI 19.90 kg/m      GENERAL: Frail, elderly male. Son at bedside   NECK: Supple without adenopathy  EYES: No scleral icterus  LUNGS: On high flow nasal canula, distant breath " sounds.  HEART: Regular rate and rhythm, S1 and S2 present, no lower extremity edema  ABDOMEN: Non-distended. Positive bowel sounds. Soft, non-tender, no guarding/rebound/mass, no obvious organomegaly  MUSKULOSKELETAL:  Warm and well perfused, moves all extremities well  SKIN: No jaundice  NEUROLOGIC: Fatigued, orientated.   PSYCHIATRIC: Normal affect    LAB DATA:  CMP Results:   Recent Labs   Lab Test 11/03/22  1300 11/03/22  1139 11/03/22  0738 11/03/22  0615 11/03/22  0451 11/02/22  0907 11/01/22  1541   NA  --   --   --   --  144 143 135*   POTASSIUM  --   --   --   --  3.8 4.4 5.2*   CHLORIDE  --   --   --   --  111* 107 102   CO2  --   --   --   --  23 25 21*   ANIONGAP  --   --   --   --  10 11 12   GLC 80 106* 161*   < > 59* 77 139*   BUN  --   --   --   --  27 26 26   CR  --   --   --   --  0.85 1.08 1.20   BILITOTAL  --   --   --   --  0.5 0.8 0.9   ALKPHOS  --   --   --   --  202* 231* 291*   ALT  --   --   --   --  88* 105* 148*   AST  --   --   --   --  109* 103* 134*    < > = values in this interval not displayed.      CBC  Recent Labs   Lab 11/03/22  0451 11/02/22  0907 11/01/22  1541   WBC 15.2* 11.7* 10.0   RBC 3.34* 3.62* 4.31*   HGB 10.2* 10.9* 13.0*   HCT 33.5* 35.6* 41.2    98 96   MCH 30.5 30.1 30.2   MCHC 30.4* 30.6* 31.6   RDW 13.9 13.7 13.5    468* 585*     INRNo lab results found in last 7 days.   Lipase   Date Value Ref Range Status   11/01/2022 30 0 - 52 U/L Final     IMAGING:  EXAM: XR VIDEO SWALLOW WITH SLP OR OT  LOCATION: [facility]  DATE/TIME: 11/3/2022 10:14 AM     INDICATION: Difficulty swallowing.  COMPARISON: None.     TECHNIQUE: Routine swallow study with speech pathology using multiple barium thicknesses.     FINDINGS:   FLUOROSCOPIC TIME: 2.37 minutes  NUMBER OF IMAGES: 6     Swallow study with Speech Pathology using multiple barium thicknesses.      Oral phase appeared normal.  Posterior epiglottic motion.  Delayed swallow  reflex.  These abnormalities cause significant vallecular and piriform sinus stasis, particularly for the puree.  There is thin barium aspiration with a weak cough. Aspiration also occurred following the puree swallow, likely due to pour over or kick back from the vallecular or piriform sinus stasis.                                                                      IMPRESSION:  1.  Thin barium aspiration with weak cough.  2.  Additional aspiration secondary to the multicomponent barium stasis    ASSESSMENT:  1. Dysphagia  2. Oropharyngeal dysphagia   89 year old male with history Schatzki ring, admitted with pneumonia requiring O2 high flow nasal canula, IV antibiotics. He has struggled with worsening transfer dysphagia symptoms recently, SLP evaluation today showed oropharyngeal dysphagia and NPO status was recommended. There are plans to trial pharyngeal strengthening exercises, but per SLP this may take weeks. He likely has some component of esophageal dysphagia related to stricture and possibly esophageal dysmotility. We could perform an EGD in the future, but would not pursue one at this time given his respiratory status - patient agrees. EGD would not treat his current oropharyngeal issues regardless.     Discussed this with patient and son. Pt may be interested in EGD at later date after his respiratory symptoms resolve, but he is not sure. They understand reason for current NPO status. Palliative care plans to see tomorrow. Patient may be interested in feeding tube, which we briefly discussed today.     PLAN:  1. No plan for further GI work-up at this time  2. Consider EGD when respiratory status improved, if patient desires.   3. Agree with palliative care consult     We will sign off. Please call with questions.     Discussed with Dr. Randolph, GI attending.     TIME SPENT: 45 min including chart review, patient interview and care coordination (ICU nursing, palliative team, attending).                                                 Lluvia Ang PA-C  Thank you for the opportunity to participate in the care of this patient.   Please feel free to call me with any questions or concerns.  Phone number [phone].

## 2022-11-03 NOTE — PROGRESS NOTES
"VIDEOFLUOROSCOPIC SWALLOW STUDY WITH SPEECH PATHOLOGY     11/03/22 1000   Appointment Info   Signing Clinician's Name / Credentials (SLP) Adelina Cooper MA, CCC-SLP   General Information   Onset of Illness/Injury or Date of Surgery 11/01/22   Referring Physician Dr. Dumont   Pertinent History of Current Problem Per provider notes, \"Zbigniew Alcocer is a 89 year old man with past medical history significant for coronary artery disease status post CABG, hypertension, GERD who presented to the hospital on 11/1/2022 with 2 weeks of productive cough and generalized weakness.  Currently being treated for sepsis due to aspiration pneumonia.\"   General Observations Patient drowsy but woke up with being repositioned in bed. He mostly kept his eyes closed, but was able to engage in conversation.   Type of Evaluation   Type of Evaluation Swallow Evaluation   General Swallowing Observations   Comment, General Swallowing Observations Patient presented with overt clinical signs and symptoms of aspiration with small sips of thin liquid and a small amount of puree during clinical swallow evaluation (11/2). Per patient/son report yesterday, patient has long hx esophageal dysphagia and symptoms of coughing with PO intake.   Respiratory Support (General Swallowing Observations) other (see comments)  (HFNC at 40 LPM, FiO2 55%)   Current Diet/Method of Nutritional Intake (General Swallowing Observations, NIS) NPO  (Except ice chips for comfort and PO meds in applesauce)   Swallowing Evaluation Videofluoroscopic swallow study (VFSS)   VFSS Evaluation   Radiologist Dr. Stanley   Views Taken left lateral   Physical Location of Procedure [facility]   VFSS Textures Trialed thin liquids;mildly thick liquids;pureed   VFSS Eval: Thin Liquid Texture Trial   Mode of Presentation, Thin Liquid spoon;cup;fed by clinician   Order of Presentation Trials 1, 2   Preparatory Phase holding;other (see " comments)  (Piecemeal deglutition)   Oral Phase, Thin Liquid residue in oral cavity;premature pharyngeal entry   Bolus Location When Swallow Triggered posterior angle of ramus  (Pourover to pyriform sinuses with second portion of bolus (piecemeal swallow))   Pharyngeal Phase, Thin Liquid impaired epiglottic movement;impaired tongue base retraction;residue in vallecula;residue in pyriform sinus   Rosenbek's Penetration Aspiration Scale: Thin Liquid Trial Results 7 - contrast passes glottis, visible subglottic residue remains despite patient's response (aspiration)   Response to Aspiration unproductive reflexive cough   Diagnostic Statement Aspiration with weak, ineffective cough response.   VFSS Eval: Mildly Thick Liquids   Mode of Presentation spoon;fed by clinician   Order of Presentation Trials 3, 4, 5   Preparatory Phase holding;other (see comments)  (Piecemeal deglutition)   Oral Phase residue in oral cavity   Bolus Location When Swallow Triggered posterior laryngeal surface of epiglottis;pyriforms   Pharyngeal Phase impaired epiglottic movement;impaired tongue base retraction;residue in vallecula;residue in pyriform sinus   Rosenbek's Penetration Aspiration Scale 1 - no aspiration, contrast does not enter airway   Diagnostic Statement No aspiration during the swallow, but aspiration of pharyngeal stasis as study progressed.   VFSS Evaluation: Puree Solid Texture Trial   Mode of Presentation, Puree spoon;fed by clinician   Order of Presentation Trial 6   Preparatory Phase prolonged bolus preparation;poor bolus control;other (see comments)  (Piecemeal deglutition)   Oral Phase, Puree impaired AP movement;residue in oral cavity;premature pharyngeal entry   Bolus Location When Swallow Triggered valleculae   Pharyngeal Phase, Puree impaired epiglottic movement;impaired tongue base retraction;residue in vallecula;residue in pyriform sinus   Rosenbek's Penetration Aspiration Scale: Puree Food Trial Results 1 - no  aspiration, contrast does not enter airway   Diagnostic Statement No definitive aspiration, but significant pharyngeal stasis present after swallows. Questionable aspiration of some of this.   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 Symptoms of esophageal dysmotility with pt regurgitating food at times. He has to eat small bites and eat slowly to compensate.   Swallowing Recommendations   Diet Consistency Recommendations NPO;other (see comments);ice chips only  (Ice chips (sparingly) only after thorough oral cares have been completed. Recommend changing PO meds to IV as able.)   Comment, Swallowing Recommendations NPO except ice chips and, if necessary, pills with very small amount of applesauce. Recommend that oral meds be changed to IV as able.   Clinical Impression   Criteria for Skilled Therapeutic Interventions Met (SLP Eval) Yes, treatment indicated   SLP Diagnosis Dysphagia   Clinical Impression Comments Video swallow study completed. Patient presents with severe dysphagia. His swallow is both unsafe and inefficient. Oral phase is notable for piecemeal deglutition with liquids and slow, effortful posterior bolus transit with puree consistency. Tongue base retraction is reduced. Pharyngeal phase is characterized by inconsistently delayed timing of the swallow reponse. Epiglottic inversion is incomplete (posterior movement with liquids; to horizontal position with puree). Hyolaryngeal elevation is adequate. Hyolaryngeal excursion is reduced. Pharyngeal constriction is reduced. Patient has osteophytes at C5-C6. These impress upon the posterior wall of the epiglottis, which appears to contribute to delayed and incomplete emptying of  the pyriform sinuses. Patient is at high risk for direct aspiration of thin liquid. With all other consistencies, he is at high risk for aspiration of pharyngeal residue. Recommend continue NPO with consideration of alternative means of nutrition. Speech Therapy will follow for trial of pharyngeal exercises, however, if return of swallow function is possible, anticipate that recovery will be slow (i.e., weeks). Patient/family may benefit from Palliative Care consult to clarify goals of care.   SLP Total Evaluation Time   Evaluation, videofluoroscopic eval of swallow function Minutes (31587) 10   SLP Discharge Planning   SLP Plan Introduce pharyngeal exercises as able (effortful, Kalani, CTAR)   SLP Discharge Recommendation Transitional Care Facility;home with home care speech therapy   SLP Rationale for DC Rec Swallow function is well below baseline   SLP Brief overview of current status  Continue NPO. Recommend consideration of alternative means of nutrition (possibly long-term). OK for limited ice chips for comfort after thorough oral cares have been completed.   Total Session Time   Total Session Time (sum of timed and untimed services) 10

## 2022-11-03 NOTE — CONSULTS
Care Management Initial Consult    General Information  Assessment completed with: Patient   Type of CM/SW Visit: Initial Assessment    Primary Care Provider verified and updated as needed:     Readmission within the last 30 days: no previous admission in last 30 days         Advance Care Planning:  States has documents but no document scanned at this time     Communication Assessment  Patient's communication style: spoken language (English or Bilingual)    Hearing Difficulty or Deaf: no   Wear Glasses or Blind: no    Cognitive  Cognitive/Neuro/Behavioral: WDL                      Living Environment:   People in home: child(rodolfo), adult, grandchild(rodolfo), spouse     Current living Arrangements:  [facility]     Able to return to prior arrangements: no       Family/Social Support:  Care provided by: self  Provides care for: no one  Marital Status:   Children, Wife          Description of Support System: Supportive, Involved         Current Resources:   Patient receiving home care services: No  Community Resources: None  Equipment currently used at home: none  Supplies currently used at home: Incontinence Supplies    Employment/Financial:  Employment Status: retired        Financial Concerns: No concerns identified        Functional Status:  Prior to admission patient needed assistance: mostly independent with assistance from daughter, admits he has been declining          Values/Beliefs:  Spiritual, Cultural Beliefs, Mu-ism Practices, Values that affect care: no               Additional Information:  Chart reviewed. CM assessed patient. Patient reports that he lives with his wife and daughter. Patient will ask daughter to bring in a copy of his health care directive. Patient receiving home care services: No  Community Resources: None Equipment currently used at home: none Supplies currently used at home: Incontinence Supplies. Patient reports that he was mostly independent with assistance from daughter, admits  he has been declining. Son Ramirez came into the hospital room at the end of assessment. Ramirez and patient agreeable to referral being sent to [facility]. Transportation TBD. CM will follow.   CM consult for facility placement.     Marilyn Jimenez RN    ,

## 2022-11-03 NOTE — PLAN OF CARE
Goal Outcome Evaluation:      Plan of Care Reviewed With: patient    Overall Patient Progress: no changeOverall Patient Progress: no change    Outcome Evaluation: Pt continues to require HFNC at 50% 40L to maintain sats >92%.  Pt NPO except ice chips and is only able to tolerate one ice chip at a time and 2-3 in a session before he begins coughing and having a wet sounding voice.  Pt has loose, non-productive cough.  LS coarse in bilateral bases.Receiving vanco and zosyn for aspiration pneumonia.  Pt had critical low glucose of 59 this AM.  Pt symptomatic with forgetful, repetitive questions and lethargy. On-call updated and orders received for D5NS @50cc/hr, PRN dextrose for hypoglycemia, and monitoring orders. Given 25mg dextrose x2 with 15 minute checks resulting in 79 and 129 respectively.  Pt tolerated well.       Problem: Risk for Delirium  Goal: Improved Sleep  Outcome: Not Progressing     Problem: Gas Exchange Impaired  Goal: Optimal Gas Exchange  Outcome: Not Progressing  Intervention: Optimize Oxygenation and Ventilation  Recent Flowsheet Documentation  Taken 11/3/2022 0200 by Bety Au RN  Head of Bed (HOB) Positioning: HOB at 20-30 degrees  Taken 11/3/2022 0000 by Bety Au RN  Head of Bed (HOB) Positioning: HOB at 20-30 degrees  Taken 11/2/2022 2200 by Bety Au RN  Head of Bed (HOB) Positioning: HOB at 20-30 degrees     Problem: Pneumonia  Goal: Fluid Balance  Outcome: Not Progressing     Problem: Pneumonia  Goal: Effective Oxygenation and Ventilation  Outcome: Not Progressing  Intervention: Promote Airway Secretion Clearance  Recent Flowsheet Documentation  Taken 11/3/2022 0200 by Bety Au RN  Cough And Deep Breathing: done independently per patient  Taken 11/3/2022 0000 by Bety Au RN  Cough And Deep Breathing: done independently per patient  Taken 11/2/2022 2200 by Bety Au RN  Cough And Deep Breathing: done independently per patient  Taken  11/2/2022 2000 by Bety Au RN  Cough And Deep Breathing: done independently per patient  Intervention: Optimize Oxygenation and Ventilation  Recent Flowsheet Documentation  Taken 11/3/2022 0200 by Bety Au RN  Head of Bed (HOB) Positioning: HOB at 20-30 degrees  Taken 11/3/2022 0000 by Bety Au RN  Head of Bed (HOB) Positioning: HOB at 20-30 degrees  Taken 11/2/2022 2200 by Bety Au RN  Head of Bed (HOB) Positioning: HOB at 20-30 degrees     Problem: Sepsis/Septic Shock  Goal: Absence of Bleeding  Outcome: Not Progressing     Problem: Sepsis/Septic Shock  Goal: Blood Glucose Level Within Targeted Range  Outcome: Not Progressing     Problem: Plan of Care - These are the overarching goals to be used throughout the patient stay.    Goal: Optimal Comfort and Wellbeing  Intervention: Provide Person-Centered Care  Recent Flowsheet Documentation  Taken 11/3/2022 0200 by Bety Au RN  Trust Relationship/Rapport:   care explained   choices provided   emotional support provided   empathic listening provided   questions answered   questions encouraged   reassurance provided   thoughts/feelings acknowledged  Taken 11/3/2022 0000 by Bety Au RN  Trust Relationship/Rapport:   care explained   choices provided   emotional support provided   empathic listening provided   questions answered   questions encouraged   reassurance provided   thoughts/feelings acknowledged  Taken 11/2/2022 2200 by Bety Au RN  Trust Relationship/Rapport:   care explained   choices provided   emotional support provided   empathic listening provided   questions answered   questions encouraged   reassurance provided   thoughts/feelings acknowledged  Taken 11/2/2022 2000 by Bety Au RN  Trust Relationship/Rapport:   care explained   choices provided   emotional support provided   empathic listening provided   questions answered   questions encouraged   reassurance provided    thoughts/feelings acknowledged

## 2022-11-04 NOTE — CONSULTS
[facility]  Palliative Care Consultation Note    Patient: Zbigniew Alcocer  Date of Admission:  11/1/2022    Requesting Clinician / Team: Dr. Huber  Reason for consult: Goals of care    Code status: Full Code    Impression & Recommendations:  SYMPTOM ASSESSMENT  1. Shortness of breath 2/2 recurrent aspiration pneumonia  -Currently on IV Zosyn with plans to continue today.  - Continue on high flow nasal cannula.  Continue other management with scheduled albuterol nebulizers, Mucomyst nebulizers and flutter valve every 6 hours.  -When patient transitions to comfort focused cares, recommend administration of Dilaudid 0.5 mg IV prior to changing from high flow nasal cannula to mask.  Would make 0.5 mg of IV Dilaudid available every 15 minutes to ease air hunger and tachypnea.    2.  Pharyngeal dysphagia  -Appreciate speech pathology evaluation and education to family about type of dysphagia and length of therapy needed to regain safer swallow today and family meeting.  -Patient declines placement of feeding tube.  -Continue n.p.o. status for now due to unsafe swallow and increased risk of cough and aspiration.    3.  Chronic pain syndrome secondary to spinal stenosis  - Patient's home medication of Tylenol 3 that he was taking about every 3-4 hours at home currently being held.  -Schedule Tylenol suppository 650 mg 3 times daily.  -Fentanyl 12.5 micrograms IV every 4 hours as needed -could potentially increase frequency of this as patient now leaning more towards comfort.   - Recommend trial of Dilaudid 0.3 mg IV every 2 hours as needed    4.  Xerostomia  -Biotene spray and mouthwash swish and spit ordered to moisten oral mucosa.      ADVANCED CARE PLANNING  -CODE STATUS: Now DNR/DNI after family care conference and discussion about benefit and burden of resuscitation and intubation.  Order placed.  - Continue current cares and medical treatments at this time.  -Family gathering to see and spend  time with patient while he is alert medically stable.  - Patient declines placement of feeding tube.  - Plan will be to transition to more comfort focused most likely tomorrow after family have gathered to visit.    GOALS OF CARE DISCUSSION  -Patient declined placement of feeding tube and tube feeding.  -Family gathering to visit with patient today and tomorrow.  Plan will then be to transition to comfort focused care with transition off high flow oxygen to mask and eventually to nasal cannula.  - Discussed utilization of opiates like Dilaudid and fentanyl to ease shortness of breath during this process and that this can also ease discomfort from patient's chronic pain.    These recommendations have been discussed with Senait Melgoza, BLANCO, Alexus Mayo RN and Dr. Lopez.      Thank you for the opportunity to participate in the care of this patient and family.      During regular M-F work hours -- if you are not sure who specifically to contact -- please contact us by calling us directly at the Palliative Care Main Line [phone]    After regular work hours and on weekends/holidays, you can leave a message at [phone]      History of Present Illness:  History gathered today from: patient, family/loved ones, medical chart, medical team members, unit team members  Zbigniew Alcocer is a 89 year old male with past medical history of CAD s/p CABG x3, GERD, HTN and esophageal stricture. Patient admitted on 11/1/2022 with shortness of breath and cough. Patient being treated for bilateral lower lobe pneumonia receiving care in ICU with HFNC FiO2 55% and 40L, monitoring, IV Zosyn and Vancomycin.    Today, the patient was seen for:  Goals of care, chronic pain, dysonea    Prognosis, Goals, & Planning:      Functional Status just prior to hospitalization: 2 (Ambulatory and capable of all selfcare but unable to carry out any work activities; may need help with IADLs up and about > 50% of waking hours)      Prognosis, Goals,  and/or Advance Care Planning were addressed today: Yes        Summary/Comments: Met with patient, wife, Nilda, daughter, Marcelle, son, Kirk, patient's RNs, Senait and Alexus, speech pathology, Bill.  Reviewed Palliative Care is specialized medical care for people with serious illness, focused on providing patients with relief from symptoms, pain and stresses of serious illness.  The goal is to improve quality of life for both the patient and the family.  Reviewed patient's current medical condition and treatments.  Appreciate speech pathology reviewing video swallow study results, recommendations for 6 speech therapy and that this would likely be a longer timeframe to potentially regain safe swallow.  Also appreciate discussion of anatomical narrowing areas of esophagus and how this will also chronically affect patient's swallow.  Discussed that aspiration has likely been going on for quite some time and is chronic.  Discussed benefit versus burden of resuscitation and intubation with patient and family.  Discussed benefit versus burden of artificial nutrition and placement of feeding tube.  Reviewed that current treatment with antibiotics will help current infection but will not prevent infection from returning due to chronic nature of aspiration and ongoing mucous plugging.  Answered multiple questions and scenarios with family.  Ultimately, patient is clear he does not wish to prolong any suffering and acknowledges he has lived a good life for 92-1/2 years.  Also acknowledges that he is grateful for the 70 years spent with his wife, Nilda.  Patient confirms wishes, and family support this, for DO NOT RESUSCITATE and DO NOT INTUBATE.  Patient wishes for no placement of feeding tube and for no artificial nutrition.  Family asked for time to notify a few other family members so that they can come and spend time with patient.  Discussed that when patient transitions to comfort focused care medications will be  administered intravenously due to his profound dysphagia.  Discussed with high level of oxygen he is currently requiring and need for IV medication, he would likely remain in the hospital until he passes.      Patient's decision making preferences: shared with support from loved ones          Patient has decision-making capacity today for complex decisions: Yes            I have concerns about the patient/family's health literacy today: No           Patient has a completed Health Care Directive: No.       Code status: Full Code    Coping, Meaning, & Spirituality:   Mood, coping, and/or meaning in the context of serious illness were addressed today: Yes  Summary/Comments: Spiritual care consulted for prayer and patient and family support.    Key Palliative Symptom Data:  # Pain severity the last 12 hours: moderate  # Dyspnea severity the last 12 hours: moderate  # Nausea severity the last 12 hours: none  # Anxiety severity the last 12 hours: low  Weakness and fatigue: Severe    ROS:  Comprehensive ROS is reviewed and is negative except as here & per HPI.     Past Medical History:  CABG x3, HTN, esophageal stricture, GERD    Past Surgical History:  Past Surgical History:   Procedure Laterality Date     BYPASS GRAFT ARTERY CORONARY  2003    3 vessel     CORONARY STENT PLACEMENT  2010     ESOPHAGOSCOPY, GASTROSCOPY, DUODENOSCOPY (EGD), COMBINED N/A 1/11/2019    Procedure: ESOPHAGOGASTRODUODENOSCOPY WITH DILATION;  Surgeon: John Olguin MD;  Location: [facility];  Service: Gastroenterology     TOTAL HIP ARTHROPLASTY Bilateral      ZZC CABG, VEIN, SINGLE      Description: CABG (CABG);  Proc Date: 10/30/2003;  Comments: LIMA to LAD, SVG to D1, SVG to PDA         Family History:  History reviewed. No pertinent family history.     Social:     Living situation: Patient lives at home here in [facility] with his wife of 70 years, Nilda.    [place] family / caregivers: Wife, Nilda, son, Kirk and daughter, Marcelle.  Marcelle  "lives with patient and his wife.    Occupational history: Received education at [employer].  Retired from "[employer] and worked there 39 years.  Degrees and chemistry and economics.    Current in-home services: none     Allergies:  Allergies   Allergen Reactions     Duloxetine Unknown     \"Loss of appetite, disoriented\"     Lisinopril Cough     Pravastatin Unknown     Dry mouth     Sulfa (Sulfonamide Antibiotics) [Sulfa Drugs] Unknown     Multiple side effects - sensitive hearing, headache, ect.      Xtampza Er [No Clinical Screening - See Comments] Unknown     Chills, night sweats        Medications:  I have reviewed this patient's medication profile and medications from this hospitalization.     Lab Results: personally reviewed.   Lab Results   Component Value Date     2022    CO2 23 2022    BUN 22 2022     Lab Results   Component Value Date    WBC 14.6 2022    HGB 10.5 2022    HCT 34.1 2022    MCV 98 2022     2022     AST   Date Value Ref Range Status   2022 109 (H) 0 - 40 U/L Final     ALT   Date Value Ref Range Status   2022 88 (H) 0 - 45 U/L Final     Alkaline Phosphatase   Date Value Ref Range Status   2022 202 (H) 45 - 120 U/L Final     Albumin   Date Value Ref Range Status   2022 1.1 (L) 3.5 - 5.0 g/dL Final       RADIOLOGY:  Echocardiogram Complete    Result Date: 2022  [id number]                                                                      Version 3  [place] [address]  Name: GALINA DRAPER MRN: [id number] : 1930 Study Date: 2022 08:15 AM Age: 89 yrs Gender: Male Patient Location: [facility] Reason For Study: CAD Ordering Physician: LEONARDO LYNCH Performed By: FESTUS  BSA: 1.9 m2 Height: 71 in Weight: 153 lb HR: 90 BP: 146/65 mmHg ______________________________________________________________________________ Procedure Complete " Portable Echo Adult. Definity (NDC #43788-209) given intravenously. Technically difficult study. Poor acoustic windows. ______________________________________________________________________________ Interpretation Summary  There is mild concentric left ventricular hypertrophy. The visual ejection fraction is 65-70%. No regional wall motion abnormalities noted. There is moderate trileaflet aortic sclerosis. No hemodynamically significant valvular aortic stenosis. There is mild (1+) tricuspid regurgitation. There is no comparison study available. ______________________________________________________________________________ Left Ventricle The left ventricle is normal in size. There is mild concentric left ventricular hypertrophy. The visual ejection fraction is 65-70%. No regional wall motion abnormalities noted.  Right Ventricle The right ventricle is not well visualized.  Atria Normal left atrial size. Right atrial size is normal. Intact atrial septum.  Mitral Valve Mitral valve leaflets appear normal. There is no evidence of mitral stenosis or clinically significant mitral regurgitation.  Tricuspid Valve Tricuspid valve leaflets appear normal. There is mild (1+) tricuspid regurgitation. Right ventricle systolic pressure estimate normal.  Aortic Valve There is moderate trileaflet aortic sclerosis. No aortic regurgitation is present. No hemodynamically significant valvular aortic stenosis.  Pulmonic Valve The pulmonic valve is normal in structure and function.  Vessels The aorta root is normal. Normal size ascending aorta.  Pericardium There is no pericardial effusion.  ______________________________________________________________________________ MMode/2D Measurements & Calculations IVSd: 1.3 cm LVIDd: 4.0 cm LVIDs: 2.6 cm LVPWd: 1.1 cm FS: 33.6 %  LV mass(C)d: 169.5 grams LV mass(C)dI: 90.1 grams/m2 LA dimension: 3.5 cm asc Aorta Diam: 3.4 cm LVOT diam: 2.1 cm LVOT area: 3.5 cm2 LA Volume (BP): 23.0 ml LA Volume  Index (BP): 12.2 ml/m2  LA Volume Indexed (AL/bp): 13.1 ml/m2 RWT: 0.57  Doppler Measurements & Calculations MV E max seth: 79.0 cm/sec MV A max seth: 112.0 cm/sec MV E/A: 0.71 MV max P.6 mmHg MV mean PG: 3.0 mmHg MV V2 VTI: 23.7 cm MVA(VTI): 2.4 cm2 MV dec slope: 299.6 cm/sec2 MV dec time: 0.27 sec Ao V2 max: 191.9 cm/sec Ao max PG: 15.0 mmHg Ao V2 mean: 132.3 cm/sec Ao mean P.8 mmHg Ao V2 VTI: 41.5 cm MARCY(I,D): 1.4 cm2 MARCY(V,D): 1.4 cm2 LV V1 max P.2 mmHg LV V1 max: 74.7 cm/sec LV V1 VTI: 16.3 cm SV(LVOT): 57.4 ml SI(LVOT): 30.5 ml/m2 PA V2 max: 87.7 cm/sec PA max PG: 3.1 mmHg PA mean P.5 mmHg PA V2 VTI: 16.7 cm PA acc time: 0.06 sec TR max seth: 238.0 cm/sec TR max P.7 mmHg AV Seth Ratio (DI): 0.39  MARCY Index (cm2/m2): 0.74 E/E': 13.9 E/E' avg: 15.8 Lateral E/e': 14.0 Medial E/e': 17.7 Peak E' Seth: 5.7 cm/sec  ______________________________________________________________________________ Report approved by: Corky Long 2022 04:53 PM       US Abdomen Limited    Result Date: 2022  EXAM: US ABDOMEN LIMITED LOCATION: [facility] DATE/TIME: 2022 9:29 PM INDICATION: Transaminitis with elevated alkaline phosphate. COMPARISON: None. TECHNIQUE: Limited abdominal ultrasound. FINDINGS: GALLBLADDER: Normal. No gallstones, wall thickening, or pericholecystic fluid. Negative sonographic Moreno's sign. BILE DUCTS: No biliary dilatation. The common duct measures 3 mm. The duct is only partially visualized. LIVER: Normal parenchyma with smooth contour. No focal mass. RIGHT KIDNEY: No hydronephrosis. PANCREAS: The limited visualized portions are normal. No ascites.     IMPRESSION: 1.  Unremarkable abdominal ultrasound, bowel gas does limit visualization of liver and pancreas.     XR Chest Port 1 View    Result Date: 2022  EXAM: XR CHEST PORT 1 VIEW LOCATION: [facility] DATE/TIME: 2022 4:32 PM INDICATION: Short of breath, coughing  COMPARISON: None.     IMPRESSION: Sternotomy. New mild bibasilar pulmonary infiltrates concerning for pneumonia.    CT Chest Pulmonary Embolism w Contrast    Result Date: 11/1/2022  EXAM: CT CHEST PULMONARY EMBOLISM W CONTRAST LOCATION: [facility] DATE/TIME: 11/1/2022 7:27 PM INDICATION: Short of breath, elevated D dimer COMPARISON: None. TECHNIQUE: CT chest pulmonary angiogram during arterial phase injection of IV contrast. Multiplanar reformats and MIP reconstructions were performed. Dose reduction techniques were used. CONTRAST: 75 mL Isovue 370 FINDINGS: ANGIOGRAM CHEST: No pulmonary embolism. Pulmonary arteries normal in caliber. Thoracic aorta normal in caliber. No aortic dissection. HEART: Prior CABG procedure. There are 3 opacified bypass grafts. Left ventricle mildly dilated with abnormal wall thinning. Severe native coronary artery calcification. MEDIASTINUM: No adenopathy or mass. LUNGS AND PLEURA: Multisegment consolidation in the lower lobes bilaterally, dependently. Numerous tree-in-bud densities are present in the superior segment of the lower lobes bilaterally. Prominent mucoid impaction of the lower lobe airways is present. Minimal groundglass density in the right upper lobe, as well as a few scattered tree-in-bud densities in the right middle lobe. No pleural effusion or pneumothorax. LIMITED UPPER ABDOMEN: Negative. MUSCULOSKELETAL: Healed sternotomy. No suspicious bone lesion.     IMPRESSION: 1.  No evidence for pulmonary embolism. 2.  Severe bilateral lower lobe pneumonia which may be secondary to aspiration, please correlate clinically.    XR Video Swallow with SLP or OT    Result Date: 11/3/2022  EXAM: XR VIDEO SWALLOW WITH SLP OR OT LOCATION: [facility] DATE/TIME: 11/3/2022 10:14 AM INDICATION: Difficulty swallowing. COMPARISON: None. TECHNIQUE: Routine swallow study with speech pathology using multiple barium thicknesses. FINDINGS:  FLUOROSCOPIC TIME: 2.37 minutes NUMBER OF IMAGES: 6 Swallow study with Speech Pathology using multiple barium thicknesses. Oral phase appeared normal. Posterior epiglottic motion. Delayed swallow reflex. These abnormalities cause significant vallecular and piriform sinus stasis, particularly for the puree. There is thin barium aspiration with a weak cough. Aspiration also occurred following the puree swallow, likely due to pour over or kick back from the vallecular or piriform sinus stasis.     IMPRESSION: 1.  Thin barium aspiration with weak cough. 2.  Additional aspiration secondary to the multicomponent barium stasis         Physical Exam:  Temp:  [97.3  F (36.3  C)-99.7  F (37.6  C)] 97.7  F (36.5  C)  Pulse:  [54-83] 54  Resp:  [12-32] 26  BP: (120-156)/(56-80) 123/56  FiO2 (%):  [40 %-55 %] 55 %  SpO2:  [88 %-95 %] 95 %  Wt Readings from Last 3 Encounters:   11/04/22 65.6 kg (144 lb 11.2 oz)   02/06/20 68.9 kg (152 lb)   01/31/20 70.5 kg (155 lb 6.4 oz)      General appearance: alert, cooperative, fatigued and mild distress  Head: Normocephalic, without obvious abnormality, atraumatic  Eyes: Lids and lashes normal.  Sclera anicteric.  Nose: High flow nasal cannula in place.  No discharge.  Throat: lips, mucosa, and tongue normal; teeth and gums normal  Lungs: diminished breath sounds base - Bilaterally  Heart: Regular rate and rhythm.  No murmur noted.  Abdomen: Soft, nontender, nondistended.  Positive bowel sounds  Extremities: No edema, cyanosis or nail clubbing noted.  Neurologic: Alert.  Oriented x4.    TTS: I have personally spent a total of 120 minutes on unit in review of medical record, consultation with the medical providers and assessment of patient today, with more than 50% of this time spent in counseling, coordination of care, and discussion with patient and family re: diagnostic results, prognosis, symptom management, risks and benefits of management options, and development of plan of care as  noted above.    JIHAN Houston, APRN, CNS  Palliative Care  [phone]

## 2022-11-04 NOTE — PROGRESS NOTES
Care Management Follow Up    Length of Stay (days): 3    Expected Discharge Date: 11/06/2022     Concerns to be Addressed: Plan will then be to transition to comfort focused care with transition off high flow oxygen to mask and eventually to nasal cannula. Family spending time with the patient while alert.         Patient plan of care discussed at interdisciplinary rounds: Yes    Anticipated Discharge Disposition:  (TBD)     Anticipated Discharge Services:  (TBD)    Anticipated Discharge DME:  (TBD)    Education Provided on the Discharge Plan:  AVS per bedside RN.    Patient/Family in Agreement with the Plan: yes    Referrals Placed by CM/SW:  TCU      Additional Information:  Chart reviewed. Palliative care consult done today. Per Palliative Care note-  Family gathering to visit with patient today and tomorrow. Plan will be to transition to more comfort focused most likely tomorrow after family have gathered to visit. Destination at discharge TBD. Transportation TBD. CM will follow.       Marilyn Jimenez RN

## 2022-11-04 NOTE — PROVIDER NOTIFICATION
Paged Dr. Dumont regarding patients AM potassium level of 3.3. No nurse driven electrolyte protocol order in place so asked if he would like this ordered.

## 2022-11-04 NOTE — PROGRESS NOTES
Pulmonary/Critical Care Medicine update  Family conference, family/patient is leaning towards comfort care but continuing current medical care for now until all visitors able to come and see him.  Appreciate Palliative Care team guidance on the planning process with family.  I did speak with wife, son, daughter regarding concerns of recurrent aspiration and thus will     At this time, continue HFNC and current medical care.   No plan to escalate care including to NIPPV or vasopressors.   Fentanyl IV initiated for pain, will see how tolerates and plan to add dilaudid IV is doing okay, which will also assist with air hunger once removed from HFNC downstream.  Code status has been changed to DNR/DNI status.     Anticipate family/friend visitation today/tomorrow.  Uncertain if will transition to comfort care tomorrow or the day after.  I have downgraded the patient to intermediate care status at this time.    Patricia Lopez MD, MPH  Pulmonary & Critical Care Medicine  11/04/2022  1:02 PM

## 2022-11-04 NOTE — PLAN OF CARE
Goal Outcome Evaluation:      Plan of Care Reviewed With: patient    Overall Patient Progress: no changeOverall Patient Progress: no change    Outcome Evaluation: Pt continues on HFNC 55% @40L, maintaining sats in low 90s. BLL crackles.  Tolerating ice chips, otherwise NPO.  Receiving IV vanco and zosyn for pna, tolerating without issue.  Receiving D5NS @25cc/hr r/t hypoglycemia in NPO status.  Sputum culture pending.      Problem: Gas Exchange Impaired  Goal: Optimal Gas Exchange  11/3/2022 2138 by Bety Au RN  Outcome: Not Progressing  11/3/2022 2137 by Bety Au RN  Outcome: Progressing  Intervention: Optimize Oxygenation and Ventilation  Recent Flowsheet Documentation  Taken 11/3/2022 2000 by Bety Au RN  Head of Bed (HOB) Positioning: HOB at 20-30 degrees     Problem: Pneumonia  Goal: Fluid Balance  11/3/2022 2138 by Bety Au RN  Outcome: Not Progressing  11/3/2022 2137 by Bety Au RN  Outcome: Progressing     Problem: Pneumonia  Goal: Effective Oxygenation and Ventilation  11/3/2022 2138 by Bety Au RN  Outcome: Not Progressing  11/3/2022 2137 by Bety Au RN  Outcome: Progressing  Intervention: Promote Airway Secretion Clearance  Recent Flowsheet Documentation  Taken 11/3/2022 2000 by Bety Au RN  Cough And Deep Breathing: done independently per patient  Intervention: Optimize Oxygenation and Ventilation  Recent Flowsheet Documentation  Taken 11/3/2022 2000 by Bety uA RN  Head of Bed (HOB) Positioning: HOB at 20-30 degrees     Problem: Pneumonia  Goal: Resolution of Infection Signs and Symptoms  11/3/2022 2138 by Bety Au RN  Outcome: Not Progressing  11/3/2022 2137 by Bety Au RN  Outcome: Progressing     Problem: Sepsis/Septic Shock  Goal: Blood Glucose Level Within Targeted Range  11/3/2022 2138 by Bety Au RN  Outcome: Not Progressing  11/3/2022 2137 by Bety Au RN  Outcome:  Progressing

## 2022-11-04 NOTE — PROVIDER NOTIFICATION
Spoke with Dr. Lopez face to face regarding patients heart rhythm being all over the place ranging from atrial flutter, fibrillation, normal sinus, and frequent ectopy. Not concerned at this time and not changing medical treatment at this point as patient is not symptomatic and plans to transition to comfort cares in the next few days.

## 2022-11-04 NOTE — PROGRESS NOTES
[facility] MEDICINE PROGRESS NOTE      Zbigniew Alcocer is a 89 year old man with past medical history significant for coronary artery disease status post CABG, hypertension, GERD who presented to the hospital on 11/1/2022 with 2 weeks of productive cough and generalized weakness.  Currently being treated for sepsis due to aspiration pneumonia.  Underwent swallow evaluation on 11/3 he has a very weak cough and recurrent aspiration due to pharyngeal stasis.  Patient is kept NPO.         #Sepsis due to aspiration pneumonia  #Acute hypoxic respiratory failure due to pneumonia.  -2 weeks of productive cough.  -Reported coughing with food consumption for the last few weeks  -CTA ruled out pulmonary embolism.  It showed bilateral basilar infiltrate suggestive of aspiration pneumonia.  -Patient was started on vancomycin and Zosyn on admission.  -Initially he was on high flow nasal cannula 60 L 85% saturating around 90%.  Wean down over the last 3 days and currently at 40 L / 55%  -Blood cultures remain negative to date.  -Pulmonology on board.  -It appears that the patient's aspiration pneumonia is slowly recovering since admission, however, the main concern at this point is his ongoing dysphagia and his nutritional status.     Plan:  [] Follow-up of blood cultures.  [] Discontinue vancomycin.  [] Continue Zosyn.  [] Wean off high flow nasal cannula as tolerated.  [] Scheduled albuterol neb and flutter valve as per critical care.  [] Follow-up with palliative team recommendations.      #Dysphagia  -History dysphagia 2/2 esophageal stricture s/p dilation.  -Reported that over the last 2 to 3 weeks he has been having difficulty with swallowing with recurrent cough.  -SLP evaluation on 11/3/2022 showed oropharyngeal dysphagia with recurrent aspiration.  Recommended keeping the patient n.p.o.  -Dysphagia cause at this point is unclear.  -Gastroenterology consulted--> some component of esophageal  dysphagia related to stricture possible esophageal dysmotility however, currently he clearly has oropharyngeal issues that would not be corrected with EGD or intervention by GI.  -Patient's was hypoglycemic and therefore he was started on D5 normal saline on 11/3/2022.  -Per palliative team note, PEG placement was discussed with the patient and he declined.  At this point he would like to focus on his comfort.      Plan:  [] Continue IV fluid.  [] Keep patient NPO.    #Hypernatremia  -Most likely due to dehydration.    Plan:  [] D5W at 100 cc/h.       #Transaminitis  #Elevated alkaline phosphate  -Resolving..    Plan:  []  Continue to monitor.    #Hypertension  -At home on amlodipine 2.5 mg daily.  Held on presentation.  -Blood pressures currently around 130/70.     Plan:  [] Continue to hold antihypertensive.       #History of coronary artery disease status post CABG  #Dyslipidemia  -At home on aspirin 81 mg daily, ezetimibe 10 mg daily, rosuvastatin 5 mg daily metoprolol succinate 50 mg twice daily, nitroglycerin sublingual as needed  -Held rosuvastatin ezetimibe on admission given transaminitis.  -Transthoracic echocardiogram on 11/1/2022 showed mild concentric left ventricular hypertrophy. Normal ejection fraction, no regional wall motion abnormalities. Moderate aortic sclerosis, mild tricuspid regurgitation.    Plan:  [] Hold aspirin given n.p.o. status.  [] Continue to Hold rosuvastatin and ezetimibe.  []  Continue metoprolol IV 2.5 mg every 6 hours.     #GERD  -At home on pantoprazole.     Plan:  []  Continue PPI.       #Spinal stenosis  -At home on acetaminophen/codeine.     Plan:  []  Started on fentanyl 12.5 mcg every 4 hours as needed by palliative team.      #Goals of care  -Palliative team had family meeting earlier today.  Patient declined PEG tube placement.  He would like to focus on his comfort.  At this point he would like to continue current antibiotic and high flow nasal cannula until tomorrow as  there are some family members that would like to visit him.  The plan is to possibly transition to more comfort care with removal of high flow nasal cannula tomorrow.  -CODE STATUS was changed to DNR/DNI.      Diet: NPO for Medical/Clinical Reasons Except for: Meds, Ice Chips, Other; Specify: limited ice chips for comfort after thorough oral cares have been completed per Speech  DVT Prophylaxis:  Enoxaparin (Lovenox) SQ  Code Status: No CPR- Do NOT Intubate    Anticipated possible discharge 2-3 days once family are able to visit him, decision regarding transition to comfort care is finalized.     Disposition Plan Pending further evaluation.    Expected Discharge Date: 11/06/2022      Destination: home with family;home with help/services  Discharge Comments: hf O2        The patient's care was discussed with the Bedside Nurse and Patient.    LEONARDO LYNCH MD  [facility]  [facility]  Phone: #[phone]    Interval History/Subjective:  Patient is concerned about his medical condition.  He continues to have intermittent cough which has been fluctuating over the last 48 hours.  He denies any chest pain.  He denies any fever or chills.  He understands the severity of his illness.    Physical Exam/Objective:  Temp:  [97.3  F (36.3  C)-99.7  F (37.6  C)] 98.3  F (36.8  C)  Pulse:  [54-83] 72  Resp:  [19-32] 30  BP: (121-149)/(56-93) 136/78  FiO2 (%):  [55 %] 55 %  SpO2:  [90 %-97 %] 97 %  Body mass index is 20.18 kg/m .    Physical Exam  Constitutional:       General: He is not in acute distress.     Appearance: He is ill-appearing.      Comments: Appears slightly more lethargic than yesterday.   HENT:      Mouth/Throat:      Mouth: Mucous membranes are moist.   Cardiovascular:      Rate and Rhythm: Normal rate and regular rhythm.      Heart sounds: Normal heart sounds. No murmur heard.  Pulmonary:      Effort: No respiratory distress.      Breath sounds: Rhonchi present. No  wheezing.      Comments: On high flow nasal cannula.  Bibasilar crackles.  Abdominal:      General: Abdomen is flat. There is no distension.      Palpations: Abdomen is soft.      Tenderness: There is no abdominal tenderness.   Musculoskeletal:         General: No swelling.   Skin:     General: Skin is warm and dry.      Coloration: Skin is not pale.   Neurological:      General: No focal deficit present.      Mental Status: He is alert.   Psychiatric:         Mood and Affect: Mood normal.         Behavior: Behavior normal.         Data reviewed today: I personally reviewed all new medications, labs, imaging/diagnostics reports over the past 24 hours. Pertinent findings include:      Imaging:   No results found for this or any previous visit (from the past 24 hour(s)).    Labs:  Most Recent 3 CBC's:  Recent Labs   Lab Test 11/04/22  0445 11/03/22  0451 11/02/22  0907   WBC 14.6* 15.2* 11.7*   HGB 10.5* 10.2* 10.9*   MCV 98 100 98   * 447 468*     Most Recent 3 BMP's:  Recent Labs   Lab Test 11/04/22  1205 11/04/22  0818 11/04/22  0445 11/03/22  0615 11/03/22  0451 11/02/22  0907   NA  --   --  149*  --  144 143   POTASSIUM  --   --  3.3*  --  3.8 4.4   CHLORIDE  --   --  114*  --  111* 107   CO2  --   --  23  --  23 25   BUN  --   --  22  --  27 26   CR  --   --  0.85  --  0.85 1.08   ANIONGAP  --   --  12  --  10 11   PLACIDO  --   --  8.2*  --  8.4* 8.9   * 95 90   < > 59* 77    < > = values in this interval not displayed.       Medications:   Personally Reviewed.  Medications     D5W 100 mL/hr at 11/04/22 1200       acetaminophen  650 mg Rectal TID     acetylcysteine  2 mL Nebulization Q6H     albuterol  2.5 mg Nebulization Q6H     [Held by provider] ALPRAZolam  0.5 mg Oral QPM     artificial saliva  2 spray Swish & Spit 4x Daily     [Held by provider] aspirin  81 mg Oral Daily     brimonidine  1 drop Right Eye BID     carboxymethylcellulose  1 drop Left Eye BID     enoxaparin ANTICOAGULANT  40 mg  Subcutaneous Q24H     influenza vac high-dose quad  0.7 mL Intramuscular Prior to discharge     latanoprost  1 drop Right Eye At Bedtime     lidocaine  1 patch Transdermal Q24h    And     lidocaine   Transdermal Q8H DEISI     metoprolol  2.5 mg Intravenous Q6H     [Held by provider] metoprolol succinate ER  25 mg Oral BID     [Held by provider] pantoprazole  20 mg Oral Daily     pantoprazole  40 mg Intravenous Daily with breakfast     piperacillin-tazobactam  3.375 g Intravenous Q8H     potassium chloride  10 mEq Intravenous Q1H     timolol maleate  1 drop Right Eye QAM

## 2022-11-04 NOTE — PROGRESS NOTES
[facility]:  CRITICAL CARE PROGRESS NOTE     Date / Time of Admission:  11/1/2022  3:26 PM    ID: Zbigniew Alcocer is a 89 year old male with CAD s/p CABG, essential hypertension, GERD, esophageal dysphagia with esophageal stricture s/p dilation 2019, spinal stenosis on Tylenol #3 who presented to [facility] ED on 11/1/2022 with shortness of breath and cough, hypoxic to 81% on RA (via EMS), and admitted for acute respiratory failure with hypoxia, sepsis with suspected aspiration pneumonia. Transferred to ICU 11/2 due to escalating O2 needs requiring high-flow nasal cannula. Course complicated by severe protein-calorie malnutrition and concerns of recurrent aspiration with pharyngeal dysphagia.          Changes for today: 1.   Initiate scheduled albuterol neb + mucomyst neb + flutter valve Q6H  2. Increase D5W infusion to 100 mL/hour.  Monitor Na, Cl.  3. K, Mg replacements.  4. Discontinue Vancomycin IV today.  5. Continue holding oral medications.  6. Family care conference today with Palliative team.        Assessment/Plan:   Neurologic: Lethargy, improved.  ABG 11/3 with no ventilation abnormalities, normal pH. Chronic spinal stenosis.  On Tylenol #3 at baseline for pain control, pain management not improved with any other narcotic agent per patient report.  Glaucoma.  History of insomnia.  Uses alprazolam, Benadryl as needed for sleep at baseline.    Fentanyl 12.5 mcg IV Q4H PRN for pain control.  Holding Tylenol #3 due to NPO status.     Continue home eyedrops: Timolol, Alphagan, refresh    Limit Benadryl, benzodiazepines given patient's age.       Pulmonary: Acute respiratory failure with hypoxia, recurrent aspiration pneumonia.  Admitted with pneumonia, CT scan 11/1 showing multisegment consolidation and bilateral lower lobes, tree-in-bud opacities in multiple lobes.  These tree-in-bud opacities consistent with bronchiolitis, mucous inspissation. Clinical history most concerning  for aspiration, video swallow study 11/3 demonstrated pharyngeal dysphagia with evidence of aspiration risk.  Patient also has history of esophageal dysphagia in the setting of esophageal strictures, requiring dilation previously.      Wean supplemental O2 as tolerated; goal O2 sat > 92%.  HOB > 30 degrees to limit aspiration risk.      HFNC, 20 -60 L/min, titrate FiO2 as tolerates.    Initiate albuterol nebs + mucomyst nebs Q6H, flutter valve Q6H to see if will assist with mucous clearance.    Check sputum culture. Abx as below.      Cardiovascular: Essential hypertension, dyslipidemia.  At baseline, on amlodipine, metoprolol, rosuvastatin.    Cardiac monitoring.  SBP >= 90 mmHg, MAP >= 65 mmHg.  EKG PRN.    Metoprolol 2.5 mg IV Q6H (substitute for home oral therapy)     GI/: Transaminitis, severe protein calorie malnutrition, pharyngeal dysphagia with evidence of aspiration. History of esophageal dysphagia with esophageal strictures s/p dilation 2019.  Abnormal LFTs, overall improving.  Limited abdominal ultrasound 11/1 unremarkable.  Has had limited oral intake for multiple days prior to admission. Video swallow study 11/2 showing delayed swallow reflex, thin barium aspiration with weak cough, aspiration also occurred following purée swallow.  Otherwise, followed by [facility] for esophageal strictures.     Appreciate GI consultation.     Follow-up repeat LFTs in the a.m.    Nutrition: Remains n.p.o. since 11/2..     Last BM:  11/3.  Meds: Dulcolax suppository as needed.  Received on 11/3.    GI prophylaxis: PPI daily.     Renal: Electrolyte abnormalities: Hypernatremia, hyperchloremia, hypokalemia.  Metabolic acidosis, lactic acidosis.    Monitor I/O's.  Electrolyte repletion PRN.  Avoid/limit nephrotoxic agents.    IVFs: D5W gtt increased to 100 mL/hr     Heme/Onc: Leukocytosis, unspecified; normocytic anemia, reactive thrombocytosis.    Trend platelet function, leukocytosis    DVT prophylaxis: SCDs, Lovenox.    "  Endocrine: Hypoglycemia.  In setting of poor nutritional state.    FSBG Q4H, insulin not indicated.  Hypoglycemia protocol.    Increase D5W gtt 100 mL/hr     Infectious diseases: Sepsis with recurrent aspiration pneumonia.  Imaging demonstrated bilateral consolidations and tree-in-bud opacities.    Repeat sputum culture, follow-up blood culture.       Continue Zosyn IV (start 11/1).  Discontinue Vancomycin IV (11/1-11/4) given BCx NGTD > 48 hours.         Rheum/Musculoskeletal:  History of spinal stenosis.     Activity:  OOB to chair as tolerates, PT/OT eval.    RISK FACTORS PRESENT ON ADMISSION:  Clinically Significant Risk Factors Present on Admission                     Lines/Drains/Tubes:  PIVs     Code Status:  Full Code.  Discussed with the patient on 11/3.  Reviewed goals of care with the patient, he request to be full code.  When asked about his long-term goals if requires prolonged support, was unable to provide information.  Stated that he was \"89 and may only live another few years.\"     The patient and/or the family was educated about the above plan of care and indicated understanding.  Family is coming to hospital today, family conference at 10 am with Palliative Care team.  Will provide updates at bedside.     This patient is considered critically ill and requires ICU level of care due to acute respiratory failure requiring high flow nasal cannula.     Total Critical Care time, not including separate billable procedure time: 50 minutes.    Patricia Lopez MD, MPH  Pulmonary/Critical Care Medicine  11/04/2022   10:41 AM         ICU DAILY CHECKLIST:   ICU DAILY CHECKLIST           Can patient transfer out of MICU? no    FAST HUG:    Feeding:  no.  Patient is receiving NPO    Mello: no  Analgesia/Sedation: no; PRNs   Thromboembolic prophylaxis: yes; Mode:  Lovenox and SCDs  HOB>30:  yes  Stress Ulcer Protocol Active: yes; Mode: PPI  Glycemic Control: Any glucose > 180 no; Mode of Insulin Therapy: Not " "indicated    INTUBATED:  Can patient have daily waking:  n/a  Can patient have spontaneous breathing trial:  n/a    Restraints? no    PHYSICAL THERAPY AND MOBILITY:  Can patient have PT and mobility trial: yes  Activity: OOB to Chair and PT/OT        Subjective/Interval History:   Feeling tired, requesting ice chips recurrently.  Reports some shortness of breath in the bed.  Denies any chest pain/pressure, wheezing, hemoptysis.   States that he would NOT want an artificial feeding tube for nutritional support.      Review of Systems:  The Review of Systems is negative other than noted in the HPI        Allergies/Medications:   Allergies:     Allergies   Allergen Reactions     Duloxetine Unknown     \"Loss of appetite, disoriented\"     Lisinopril Cough     Pravastatin Unknown     Dry mouth     Sulfa (Sulfonamide Antibiotics) [Sulfa Drugs] Unknown     Multiple side effects - sensitive hearing, headache, ect.      Xtampza Er [No Clinical Screening - See Comments] Unknown     Chills, night sweats       Continuous Infusions:    D5W 100 mL/hr at 11/04/22 0804     Scheduled Medications:    acetylcysteine  2 mL Nebulization Q6H     albuterol  2.5 mg Nebulization Q6H     [Held by provider] ALPRAZolam  0.5 mg Oral QPM     [Held by provider] aspirin  81 mg Oral Daily     brimonidine  1 drop Right Eye BID     carboxymethylcellulose  1 drop Left Eye BID     enoxaparin ANTICOAGULANT  40 mg Subcutaneous Q24H     influenza vac high-dose quad  0.7 mL Intramuscular Prior to discharge     latanoprost  1 drop Right Eye At Bedtime     lidocaine  1 patch Transdermal Q24h    And     lidocaine   Transdermal Q8H DEISI     metoprolol  2.5 mg Intravenous Q6H     [Held by provider] metoprolol succinate ER  25 mg Oral BID     [Held by provider] pantoprazole  20 mg Oral Daily     pantoprazole  40 mg Intravenous Daily with breakfast     piperacillin-tazobactam  3.375 g Intravenous Q8H     potassium chloride  10 mEq Intravenous Q1H     timolol " "maleate  1 drop Right Eye QAM     vancomycin  1,000 mg Intravenous Q24H           Objective:   Vitals:  /56 (BP Location: Right arm)   Pulse 54   Temp 98  F (36.7  C) (Axillary)   Resp 26   Ht 1.803 m (5' 11\")   Wt 65.6 kg (144 lb 11.2 oz)   SpO2 95%   BMI 20.18 kg/m    Vent: FiO2 (%): 55 %  Resp: 26    GEN: Pleasant male, fatigued, weak  HEENT: Normocephalic, atraumatic.  Wearing glasses. Extraoccular eye movements intact, anicteric sclera. Moist mucous membranes.   NECK: Supple.    PULM: Non-labored breathing.  No use of accessory muscles.  Diminished breath sounds at bases.   CVS: Regular rate and rhythm.  Normal S1, S2.  No rubs, murmurs, or gallops.    ABDOMEN: Normoactive bowel sounds.  Non-tender to palpation.  Non-distended.    EXTREMITES:  No clubbing, cyanosis, or edema.    NEURO:  Awake.  Oriented to person, place, time and situation.  Cranial nerves 2-12 grossly intact.  Moving all extremities.      Intake/Output: I/O last 3 completed shifts:  In: 1019.58 [P.O.:90; I.V.:929.58]  Out: 625 [Urine:625]        Pertinent Studies:   All laboratory data reviewed  Serum Glucose range:   Recent Labs   Lab 11/04/22 0818 11/04/22 0445 11/04/22 0224 11/03/22 2021   GLC 95 90 97 94     ABG:   Recent Labs   Lab 11/03/22  0921   PH 7.43   PCO2 41   PO2 71*   HCO3 27   O2PER 55     CBC:   Recent Labs   Lab 11/04/22 0445 11/03/22 0451 11/02/22  0907 11/01/22  1541   WBC 14.6* 15.2* 11.7* 10.0   HGB 10.5* 10.2* 10.9* 13.0*   HCT 34.1* 33.5* 35.6* 41.2   MCV 98 100 98 96   * 447 468* 585*   NEUTROPHIL  --  89 88 87   LYMPH  --  6 7 5   MONOCYTE  --  4 4 7   EOSINOPHIL  --  0 0 0     Chemistry:   Recent Labs   Lab 11/04/22 0445 11/03/22 0451 11/02/22  0907 11/01/22  1541   * 144 143 135*   POTASSIUM 3.3* 3.8 4.4 5.2*   CHLORIDE 114* 111* 107 102   CO2 23 23 25 21*   BUN 22 27 26 26   CR 0.85 0.85 1.08 1.20   GFRESTIMATED 82 82 64 57*   PLACIDO 8.2* 8.4* 8.9 9.2   MAG 1.9  --  1.9 2.0 "   PROTTOTAL  --  5.0* 5.5* 6.9   ALBUMIN  --  1.1* 1.3* 1.7*   AST  --  109* 103* 134*   ALT  --  88* 105* 148*   ALKPHOS  --  202* 231* 291*   BILITOTAL  --  0.5 0.8 0.9     Coags:  No results for input(s): INR, PROTIME, PTT in the last 168 hours.    Invalid input(s): APTT  Cardiac Markers:  Recent Labs   Lab 11/01/22  1541   TROPONINI 0.02      Microbiology:  Sputum culture, 11/2:  Inadequate sample  Blood cultures x2, 11/2: NGTD  COVID-19 PCR, 11/1: Negative  Influenza A/B, RSV nasal swab, 11/1: Negative        Cardiology/Radiology:   Cardiac: All cardiac studies reviewed by me.    EKG:  Reviewed    TTE, 11/2/22:  Summary:  There is mild concentric left ventricular hypertrophy. The visual ejection fraction is 65-70%. No regional wall motion abnormalities noted. There is moderate trileaflet aortic sclerosis. No hemodynamically significant valvular aortic stenosis. There is mild (1+) tricuspid regurgitation. There is no comparison study available.    Radiology:  All imaging studies reviewed by me.    Chest X-Ray, 11/4/22:   Sternotomy. New mild bibasilar pulmonary infiltrates concerning for pneumonia     CTA Chest PE study, 11/1/22:   FINDINGS: ANGIOGRAM CHEST: No pulmonary embolism. Pulmonary arteries normal in caliber. Thoracic aorta normal in caliber. No aortic dissection.  HEART: Prior CABG procedure. There are 3 opacified bypass grafts. Left ventricle mildly dilated with abnormal wall thinning. Severe native coronary artery calcification. MEDIASTINUM: No adenopathy or mass. LUNGS AND PLEURA: Multisegment consolidation in the lower lobes bilaterally, dependently. Numerous tree-in-bud densities are present in the superior segment of the lower lobes bilaterally. Prominent mucoid impaction of the lower lobe airways is present.  Minimal groundglass density in the right upper lobe, as well as a few scattered tree-in-bud densities in the right middle lobe. No pleural effusion or pneumothorax. LIMITED UPPER ABDOMEN:  Negative. MUSCULOSKELETAL: Healed sternotomy. No suspicious bone lesion. IMPRESSION: 1.  No evidence for pulmonary embolism.  2.  Severe bilateral lower lobe pneumonia which may be secondary to aspiration, please correlate clinically.

## 2022-11-04 NOTE — PROGRESS NOTES
SPIRITUAL HEALTH SERVICES Progress Note      Visited Rahul per consult order.    Rahul woke to writers presence and easily engaged in reflective conversation exploring themes of mortality, evan and family.  Rahul reflected on life and its complexities.  Rahul made expressions of gratitude for family and expressed feelings of acceptance and peace with dying.  Rahul has a deep Hindu evan and his  visit just after    shared familiar scripture and prayer to affirm evan and promote peace.    Spiritual Care will continue to assess and visit 2x a week           Saurabh Salcedo M.Div., [facility]  Staff    [phone]

## 2022-11-04 NOTE — PLAN OF CARE
Problem: Sepsis/Septic Shock  Goal: Optimal Nutrition Intake  Outcome: Not Progressing     Problem: Pain Chronic (Persistent)  Goal: Optimal Pain Control and Function  Outcome: Progressing     Pt didn't complain of pain, just abd discomfort. Did have a medium BM. Sating mid 90's on HFNC 55% FiO2 and 40L. Remains NPO with ice chips only. Pt has a R heel wound, pt states it is from prior to admission. Pt resting, will continue to monitor.

## 2022-11-04 NOTE — PLAN OF CARE
Problem: Risk for Delirium  Goal: Optimal Coping  Outcome: Progressing     Problem: Gas Exchange Impaired  Goal: Optimal Gas Exchange  Outcome: Progressing     Problem: Pain Chronic (Persistent)  Goal: Optimal Pain Control and Function  Outcome: Progressing     Pt A&Ox2-3, intermittently disoriented to place and time. Pt reporting 5/10 pain in back related to chrinic pain, PRN fentanyl given with relief. Palliative care consult done today, code status changed from full to DNR/DNI. Family and  of the pt at bedside. Pt status downgraded from ICU to Intermediate care. Dextrose gtt running 100ml/hr, IV abx and replacement also running.     Alexus Mayo, RN  [phone]

## 2022-11-04 NOTE — PROGRESS NOTES
Patient started on Albuterol and mucomyst this afternoon. Patient remains on HFNC 40 LPM 55% FIO2. Attempted to instruct on the flutter valve and patient was unable to perform. Will continue to monitor respiratory status.     Pinky Traore, RT

## 2022-11-04 NOTE — PLAN OF CARE
Patient has tolerated IV pain medication administration and complains of constant back pain. Patient turned/weight shifted as much as he can tolerate. Heels elevated. Patient given ice chips per request. Dextrose 5% infusing and blood sugars are stable. IV antibiotics infusing and tolerating. Vitally stable. Heart rhythm has been all over the place today and evening IV metoprolol held for heart rate 57-62. Patient sleeping most of afternoon and family not at bedside. Plans for family to get in contact to visit patient before transitioning to comfort cares. Continues NPO and refuses feeding tube to be placed. Patient discussed with RN realistic views of long term prognosis, treatments that would be necessary, and goals of care. Patient reports multiple times having lived a great 89 years and would like to focus on being comfortable at this time.       Problem: Plan of Care - These are the overarching goals to be used throughout the patient stay.    Goal: Optimal Comfort and Wellbeing  Outcome: Progressing     Problem: Plan of Care - These are the overarching goals to be used throughout the patient stay.    Goal: Readiness for Transition of Care  Outcome: Progressing

## 2022-11-05 NOTE — PLAN OF CARE
Goal Outcome Evaluation:       Pt slept throughout shift.  No c/o pain, sched tylenol managing.  VSS, O2 titrated down to 40L 45%.  A&O 1-3, seems more oriented this AM.  Pt tolerating ice chips one at a time.

## 2022-11-05 NOTE — PROGRESS NOTES
Pt received on HFNC  40LPM/45%.  Now down to 40% Fio2.    Pt given Alb/MM nebs inline with HFNC as ordered.  RR - mid 20's, BS diminished.  Will continue to monitor pt.  HFNC D/C'd approx 1810

## 2022-11-05 NOTE — PROGRESS NOTES
Care Management Follow Up    Length of Stay (days): 4    Expected Discharge Date: 11/08/2022       Additional Information:  CM has been following along for discharge planning. Seems he may transition to comfort cares this weekend. Will continue to follow, assist if needed for home hospice set up, or as needs arise.      Maylin Jaimes RN

## 2022-11-05 NOTE — PLAN OF CARE
"Patient transitioned to comfort cares during shift. Had 2 bowel movements but low urine output/production. Continuous fluids stopped and high flow nasal cannula removed. Patient premedicated and at this point reports feeling comfortable. Family at bedside and spiritual care assisted during transition. Tele removed. Bed bath completed before family arrived to unit.     Problem: Plan of Care - These are the overarching goals to be used throughout the patient stay.    Goal: Plan of Care Review  Description: The Plan of Care Review/Shift note should be completed every shift.  The Outcome Evaluation is a brief statement about your assessment that the patient is improving, declining, or no change.  This information will be displayed automatically on your shift note.  Outcome: Progressing  Goal: Patient-Specific Goal (Individualized)  Description: You can add care plan individualizations to a care plan. Examples of Individualization might be:  \"Parent requests to be called daily at 9am for status\", \"I have a hard time hearing out of my right ear\", or \"Do not touch me to wake me up as it startles me\".  Outcome: Progressing  Goal: Absence of Hospital-Acquired Illness or Injury  Outcome: Progressing  Intervention: Identify and Manage Fall Risk  Recent Flowsheet Documentation  Taken 11/5/2022 1600 by Senait Pollard, RN  Safety Promotion/Fall Prevention:   activity supervised   assistive device/personal items within reach   increase visualization of patient   clutter free environment maintained   room door open   room near nurse's station   room organization consistent   safety round/check completed  Taken 11/5/2022 1200 by Senait Pollard, RN  Safety Promotion/Fall Prevention:   activity supervised   assistive device/personal items within reach   increase visualization of patient   clutter free environment maintained   room door open   room near nurse's station   room organization consistent   safety round/check " completed  Taken 11/5/2022 0800 by Senait Pollard RN  Safety Promotion/Fall Prevention:   activity supervised   assistive device/personal items within reach   increase visualization of patient   clutter free environment maintained   room door open   room near nurse's station   room organization consistent   safety round/check completed  Intervention: Prevent Skin Injury  Recent Flowsheet Documentation  Taken 11/5/2022 1600 by Senait Pollard RN  Body Position:   turned   right   heels elevated   head repositioned, right   weight shifting  Taken 11/5/2022 1200 by Senait Pollard RN  Body Position:   turned   right   heels elevated   head repositioned, right   weight shifting  Taken 11/5/2022 0800 by Senait Pollard RN  Body Position:   turned   right   heels elevated   head repositioned, right   weight shifting  Intervention: Prevent and Manage VTE (Venous Thromboembolism) Risk  Recent Flowsheet Documentation  Taken 11/5/2022 1600 by Senait Pollard RN  VTE Prevention/Management: patient refused intervention  Taken 11/5/2022 1200 by Senait Pollard RN  VTE Prevention/Management: patient refused intervention  Taken 11/5/2022 0800 by Senait Pollard RN  VTE Prevention/Management: patient refused intervention  Goal: Optimal Comfort and Wellbeing  Outcome: Progressing  Intervention: Monitor Pain and Promote Comfort  Recent Flowsheet Documentation  Taken 11/5/2022 1600 by Senait Pollard RN  Pain Management Interventions: medication (see MAR)  Taken 11/5/2022 1200 by Senait Pollard RN  Pain Management Interventions: medication (see MAR)  Taken 11/5/2022 0800 by Senait Pollard RN  Pain Management Interventions: medication (see MAR)  Intervention: Provide Person-Centered Care  Recent Flowsheet Documentation  Taken 11/5/2022 1600 by Senait Pollard RN  Trust Relationship/Rapport:   care explained   emotional support provided   questions answered   questions encouraged   reassurance provided   choices provided    empathic listening provided   thoughts/feelings acknowledged  Taken 11/5/2022 1200 by Senait Pollard RN  Trust Relationship/Rapport:   care explained   emotional support provided   questions answered   questions encouraged   reassurance provided   choices provided   empathic listening provided   thoughts/feelings acknowledged  Taken 11/5/2022 0800 by Senait Pollard RN  Trust Relationship/Rapport:   care explained   emotional support provided   questions answered   questions encouraged   reassurance provided   choices provided   empathic listening provided   thoughts/feelings acknowledged  Goal: Readiness for Transition of Care  Outcome: Progressing

## 2022-11-05 NOTE — PROVIDER NOTIFICATION
"Paged Dr. Dumont regarding patient and family decision to transition to comfort cares at 6pm when family is available at bedside. Writer reviewed patients options again regarding treatment with tube feeding, speech therapy, and antibiotic treatment. At this time patient continues to state that he would like to focus on comfort as \"aggressive treatment would be worse in the end for me and my family\". Daughter Marcelle at bedside and coordinating family connection.  "

## 2022-11-05 NOTE — PROGRESS NOTES
Speech Language Therapy Discharge Summary    Reason for therapy discharge:    Patient/family moving direction of comfort cares    Progress towards therapy goal(s). See goals on Care Plan in Epic electronic health record for goal details.  Goals not met.  Barriers to achieving goals:   Patient prognosis/medical status and moving toward comfort cares.    Therapy recommendation(s):    No further therapy is recommended. Feel free to contact our team if any questions arise.

## 2022-11-05 NOTE — PROGRESS NOTES
[facility] MEDICINE PROGRESS NOTE      Zbigniew Alcocer is a 89 year old man with past medical history significant for coronary artery disease status post CABG, hypertension, GERD who presented to the hospital on 11/1/2022 with 2 weeks of productive cough and generalized weakness.  Currently being treated for sepsis due to aspiration pneumonia.  Underwent swallow evaluation on 11/3 he has a very weak cough and recurrent aspiration due to pharyngeal stasis.  Patient is kept NPO.  Had a meeting with palliative care.  CODE STATUS changed to DNR/DNI.  Elected not to get a PEG tube.  Patient would like to focus on his comfort.  Moving to comfort measures tonight.       #Sepsis due to aspiration pneumonia  #Acute hypoxic respiratory failure due to pneumonia.  -2 weeks of productive cough.  -Reported coughing with food consumption for the last few weeks  -CTA ruled out pulmonary embolism.  It showed bilateral basilar infiltrate suggestive of aspiration pneumonia.  -Patient was started on vancomycin and Zosyn on admission.  -Blood cultures remain negative to date.  -Pulmonology was on board.  -Remains on high flow nasal cannula.     Plan:  [] Patient and family is moving forward with comfort care later on today.  [] Continue Zosyn.  [] Follow-up with palliative team recommendations.      #Goals of care  -Palliative team had a meeting with the patient and his family on 11/4.  Patient declined PEG tube placement.  He would like to focus on his comfort.   -Discussed goals of care with the patient earlier today, he believes that his quality of life even if he recovered from this pneumonia would not be good and he would be a burden to his family.  He would like to focus on his comfort.  -Nursing staff stated that the patient and his family would like to move to comfort care later on today.    Plan:  [] Comfort care order placed.  [] Stop medication that would not contribute to the patient's  comfort.  [] Unclear if the patient will be able to maintain his oxygenation on nasal cannula.  If that is the case, we will further discuss inpatient versus outpatient hospice.    #Dysphagia  -History dysphagia 2/2 esophageal stricture s/p dilation.  -Reported that over the last 2 to 3 weeks he has been having difficulty with swallowing with recurrent cough.  -SLP evaluation on 11/3/2022 showed oropharyngeal dysphagia with recurrent aspiration.  Recommended keeping the patient n.p.o.  -Dysphagia cause at this point is unclear.  -Gastroenterology consulted--> some component of esophageal dysphagia related to stricture possible esophageal dysmotility however, currently he clearly has oropharyngeal issues that would not be corrected with EGD or intervention by GI.  -Patient's was hypoglycemic and therefore he was started on D5 normal saline on 11/3/2022.  -Per palliative team note, PEG placement was discussed with the patient and he declined.  At this point he would like to focus on his comfort.      Plan:  [] Continue IV fluid.  [] Consider pleasure feeds after initiating comfort care later on today.      #Hypertension     Plan:  [] Continue to hold antihypertensive.       #History of coronary artery disease status post CABG  #Dyslipidemia  -At home on aspirin 81 mg daily, ezetimibe 10 mg daily, rosuvastatin 5 mg daily metoprolol succinate 50 mg twice daily, nitroglycerin sublingual as needed  -Held rosuvastatin ezetimibe on admission given transaminitis.  -Transthoracic echocardiogram on 11/1/2022 showed mild concentric left ventricular hypertrophy. Normal ejection fraction, no regional wall motion abnormalities. Moderate aortic sclerosis, mild tricuspid regurgitation.    Plan:  [] Hold aspirin, metoprolol and statins.       #GERD  -At home on pantoprazole.     Plan:  []  Hold pantoprazole.    #Spinal stenosis  -At home on acetaminophen/codeine.     Plan:  [] Continue fentanyl 12.5 mcg every 4 hours as needed by  "palliative team.          Diet: NPO for Medical/Clinical Reasons Except for: Meds, Ice Chips, Other; Specify: limited ice chips for comfort after thorough oral cares have been completed per Speech  DVT Prophylaxis:  Enoxaparin (Lovenox) SQ  Code Status: No CPR- Do NOT Intubate         Disposition Plan Pending further evaluation.     Expected Discharge Date: 11/08/2022      Destination: home with family;home with help/services  Discharge Comments: [facility] status currently.  Likely change to comfort care after more family visit.        The patient's care was discussed with the Bedside Nurse and Patient.    LEONARDO LYNCH MD  [facility]  Phone: #[phone]    Interval History/Subjective:  Patient again reiterated that he would like to focus on his comfort.  \"I lived for over 89 years and I believe is not bad\".  He denies any shortness of breath or chest pain.  He continues to have cough and feeling generally weak.    Physical Exam/Objective:  Temp:  [97.4  F (36.3  C)-98.3  F (36.8  C)] 97.6  F (36.4  C)  Pulse:  [59-73] 66  Resp:  [19-30] 22  BP: (125-151)/(58-78) 147/68  FiO2 (%):  [45 %-55 %] 45 %  SpO2:  [93 %-97 %] 93 %  Body mass index is 20.92 kg/m .    Physical Exam  Constitutional:       General: He is not in acute distress.     Appearance: He is ill-appearing. He is not diaphoretic.      Comments: Appears slightly more lethargic than yesterday.   HENT:      Mouth/Throat:      Mouth: Mucous membranes are moist.   Cardiovascular:      Rate and Rhythm: Normal rate and regular rhythm.      Heart sounds: Normal heart sounds. No murmur heard.  Pulmonary:      Effort: No respiratory distress.      Breath sounds: Rhonchi present. No wheezing.      Comments: On high flow nasal cannula.  Bibasilar crackles slightly better since admission.  Abdominal:      General: Abdomen is flat. There is no distension.      Palpations: Abdomen is soft.      Tenderness: " There is no abdominal tenderness.   Musculoskeletal:         General: No swelling.   Skin:     General: Skin is warm and dry.      Coloration: Skin is not pale.   Neurological:      General: No focal deficit present.      Mental Status: He is alert.   Psychiatric:         Mood and Affect: Mood normal.         Behavior: Behavior normal.         Data reviewed today: I personally reviewed all new medications, labs, imaging/diagnostics reports over the past 24 hours. Pertinent findings include:      Imaging:   No results found for this or any previous visit (from the past 24 hour(s)).    Labs:  Most Recent 3 CBC's:  Recent Labs   Lab Test 11/05/22  0458 11/04/22  0445 11/03/22  0451   WBC 11.9* 14.6* 15.2*   HGB 10.1* 10.5* 10.2*   MCV 98 98 100   * 478* 447     Most Recent 3 BMP's:  Recent Labs   Lab Test 11/05/22  0909 11/05/22  0458 11/05/22  0101 11/04/22  2035 11/04/22  1703 11/04/22  1657 11/04/22  1510 11/04/22  0818 11/04/22  0445 11/03/22  0615 11/03/22  0451   NA  --  136  --   --   --   --   --   --  149*  --  144   POTASSIUM  --  3.6  --   --  4.1  --  3.7  --  3.3*  --  3.8   CHLORIDE  --  106  --   --   --   --   --   --  114*  --  111*   CO2  --  22  --   --   --   --   --   --  23  --  23   BUN  --  17  --   --   --   --   --   --  22  --  27   CR  --  0.76  --   --   --   --   --   --  0.85  --  0.85   ANIONGAP  --  8  --   --   --   --   --   --  12  --  10   PLACIDO  --  7.7*  --   --   --   --   --   --  8.2*  --  8.4*   * 135* 159*   < >  --    < >  --    < > 90   < > 59*    < > = values in this interval not displayed.       Medications:   Personally Reviewed.  Medications     D5W 100 mL/hr at 11/05/22 1338       acetaminophen  650 mg Rectal TID     acetylcysteine  2 mL Nebulization Q6H     albuterol  2.5 mg Nebulization Q6H     [Held by provider] ALPRAZolam  0.5 mg Oral QPM     artificial saliva  2 spray Swish & Spit 4x Daily     [Held by provider] aspirin  81 mg Oral Daily      brimonidine  1 drop Right Eye BID     carboxymethylcellulose  1 drop Left Eye BID     enoxaparin ANTICOAGULANT  40 mg Subcutaneous Q24H     influenza vac high-dose quad  0.7 mL Intramuscular Prior to discharge     latanoprost  1 drop Right Eye At Bedtime     lidocaine  1 patch Transdermal Q24h    And     lidocaine   Transdermal Q8H DEISI     metoprolol  2.5 mg Intravenous Q6H     [Held by provider] metoprolol succinate ER  25 mg Oral BID     [Held by provider] pantoprazole  20 mg Oral Daily     pantoprazole  40 mg Intravenous Daily with breakfast     piperacillin-tazobactam  3.375 g Intravenous Q8H     timolol maleate  1 drop Right Eye QAM      0

## 2022-11-06 NOTE — PROGRESS NOTES
Care Management Follow Up    Length of Stay (days): 5    Expected Discharge Date: 11/6/22 evening       Additional Information:  Cm been assisting with discharge planning. Patient opted for comfort cares, however did not pass as expected. Hoped to go home to die. Wife and daughter in full agreement if we can get him home, that would be best. [facility] has accepted patient, and will coordinate bed delivery this afternoon, and a nurse visit around 530-6pm. Stretcher ride set for 530pm tonight to home.      Maylin Jaimes RN

## 2022-11-06 NOTE — PLAN OF CARE
Goal Outcome Evaluation:  Patient transitioned to comfort cares last evening. Patient reported no pain overnight, tolerated q2-3h micro-turns. External catheter remains in place, good UOP. Ice chips for comfort, tolerating well. Family at bedside supportive and partaking in cares.       Problem: Plan of Care - These are the overarching goals to be used throughout the patient stay.    Goal: Absence of Hospital-Acquired Illness or Injury  Intervention: Prevent Skin Injury  Recent Flowsheet Documentation  Taken 11/6/2022 0245 by Hector Posadas RN  Body Position:   turned   legs elevated   supine   supine, legs elevated  Taken 11/6/2022 0045 by Hector Posadas RN  Body Position:   turned   left   side-lying 30 degrees     Problem: Plan of Care - These are the overarching goals to be used throughout the patient stay.    Goal: Optimal Comfort and Wellbeing  Intervention: Provide Person-Centered Care  Recent Flowsheet Documentation  Taken 11/6/2022 0030 by Hector Posadas RN  Trust Relationship/Rapport:   care explained   emotional support provided   questions encouraged   questions answered   empathic listening provided   thoughts/feelings acknowledged     Hector Posadas RN on 11/6/2022 at 5:54 AM

## 2022-11-06 NOTE — DISCHARGE SUMMARY
[facility] MEDICINE  DISCHARGE SUMMARY     Primary Care Physician: Jeferson Scott  Admission Date: 11/1/2022   Discharge Provider: LEONARDO LYNCH MD Discharge Date: 11/6/2022   Diet:   Active Diet and Nourishment Order   Procedures     Regular Diet Adult     Diet       Code Status: No CPR- Do NOT Intubate   Activity: DCACTIVITY: Activity as tolerated        Condition at Discharge: Critical     REASON FOR PRESENTATION(See Admission Note for Details)   Generalized weakness and cough due to aspiration pneumonia.    PRINCIPAL & ACTIVE DISCHARGE DIAGNOSES     Principal Problem:    Elevated d-dimer  Active Problems:    Hypoxia    Pneumonia of both lower lobes due to infectious organism      PENDING LABS     Unresulted Labs Ordered in the Past 30 Days of this Admission     Date and Time Order Name Status Description    11/2/2022  9:43 AM Blood Culture Hand, Left Preliminary     11/2/2022  9:43 AM Blood Culture Arm, Right Preliminary               DISPOSITION     Home    SUMMARY OF HOSPITAL COURSE:    Zbigniew Alcocer is a 89 year old man with past medical history significant for coronary artery disease status post CABG, hypertension, GERD who presented to the hospital on 11/1/2022 with 2 weeks of productive cough and generalized weakness.  Currently being treated for sepsis due to aspiration pneumonia.  Underwent swallow evaluation on 11/3 he has a very weak cough and recurrent aspiration due to pharyngeal stasis.  Patient is kept NPO.  Had a meeting with palliative care.  CODE STATUS changed to DNR/DNI.  Elected not to get a PEG tube.  Patient would like to focus on his comfort.  Change his goals of care to comfort care on 11/5/2022.  Patient was taking off oxygen, however, he saturating around 88%.  All medications including antibiotic were stopped.  Patient and family elected for home hospice.       #Goals of care  -Palliative team had a meeting with the patient and his  family on 11/4.  Patient declined PEG tube placement.  He would like to focus on his comfort.   -Discussed goals of care with the patient, he believes that his quality of life even if he recovered from this pneumonia would not be good and he would be a burden to his family.  He would like to focus on his comfort.    Plan:  [] Discharge home with home hospice.       #Dysphagia  -History dysphagia 2/2 esophageal stricture s/p dilation.  -Reported that over the last 2 to 3 weeks he has been having difficulty with swallowing with recurrent cough.  -SLP evaluation on 11/3/2022 showed oropharyngeal dysphagia with recurrent aspiration.  Recommended keeping the patient n.p.o.  -Dysphagia cause at this point is unclear.  -Gastroenterology consulted--> some component of esophageal dysphagia related to stricture possible esophageal dysmotility however, currently he clearly has oropharyngeal issues that would not be corrected with EGD or intervention by GI.  -Patient's was hypoglycemic and therefore he was started on D5 normal saline on 11/3/2022.  -Per palliative team note, PEG placement was discussed with the patient and he declined.  At this point he would like to focus on his comfort.        Plan:  [] Pleasure feeds.                Discharge Medications with Med changes:     Current Discharge Medication List      START taking these medications    Details   artificial saliva (BIOTENE DRY MOUTHWASH) LIQD liquid Swish and spit 15 mLs in mouth 4 times daily as needed for dry mouth  Qty: 100 mL, Refills: 0    Associated Diagnoses: End of life care      bisacodyl (DULCOLAX) 10 MG suppository Place 1 suppository (10 mg) rectally daily as needed for constipation  Qty: 20 suppository, Refills: 0    Associated Diagnoses: End of life care      fentaNYL, PF, (SUBLIMAZE) 100 MCG/2ML injection Inject 0.25 mLs (12.5 mcg) into the vein every 4 hours as needed for moderate to severe pain  Qty: 10 mL, Refills: 0    Associated Diagnoses: End  of life care      Morphine Sulfate (MORPHINE, PF,) 2 MG/ML injection Inject 1-2 mg into the vein every 15 minutes as needed for moderate to severe pain (or dyspnea)  Qty: 10 mL, Refills: 0    Associated Diagnoses: End of life care      ondansetron (ZOFRAN) 2 MG/ML SOLN injection Inject 2 mLs (4 mg) into the vein every 6 hours as needed for nausea or vomiting  Qty: 50 mL, Refills: 0    Associated Diagnoses: End of life care         CONTINUE these medications which have NOT CHANGED    Details   acetaminophen-codeine (TYLENOL #3) 300-30 mg per tablet [ACETAMINOPHEN-CODEINE (TYLENOL #3) 300-30 MG PER TABLET] Take 1 tablet by mouth every 4 (four) hours as needed.  Qty: 22 tablet, Refills: 0    Associated Diagnoses: Left hip pain; S/p left hip fracture      ALPRAZolam (XANAX) 0.5 MG tablet [ALPRAZOLAM (XANAX) 0.5 MG TABLET] Take 1 tablet (0.5 mg total) by mouth every evening.  Qty: 30 tablet, Refills: 0    Associated Diagnoses: Left hip pain; S/p left hip fracture      HEMP OIL OR EXTRACT OR OTHER CBD CANNABINOID, NOT MEDICAL CANNABIS, Apply 1 Application topically as needed Cbd cream      latanoprost (XALATAN) 0.005 % ophthalmic solution [LATANOPROST (XALATAN) 0.005 % OPHTHALMIC SOLUTION] Administer 1 drop to the right eye at bedtime.   Refills: 1      timolol maleate (TIMOPTIC) 0.5 % ophthalmic solution Place 1 drop into the right eye every morning  Refills: 12         STOP taking these medications       amLODIPine (NORVASC) 2.5 MG tablet Comments:   Reason for Stopping:         amoxicillin (AMOXIL) 500 MG capsule Comments:   Reason for Stopping:         aspirin 81 MG EC tablet Comments:   Reason for Stopping:         brimonidine (ALPHAGAN) 0.2 % ophthalmic solution Comments:   Reason for Stopping:         carboxymethylcellulose PF (REFRESH PLUS) 0.5 % ophthalmic solution Comments:   Reason for Stopping:         diphenhydrAMINE (BENADRYL) 25 MG capsule Comments:   Reason for Stopping:         ezetimibe (ZETIA) 10 mg  tablet Comments:   Reason for Stopping:         Methyl Salicylate 20 % GEL Comments:   Reason for Stopping:         metoprolol succinate ER (TOPROL XL) 50 MG 24 hr tablet Comments:   Reason for Stopping:         nitroglycerin (NITROSTAT) 0.4 MG SL tablet Comments:   Reason for Stopping:         pantoprazole (PROTONIX) 20 MG tablet Comments:   Reason for Stopping:         rosuvastatin (CRESTOR) 5 MG tablet Comments:   Reason for Stopping:         trimethoprim (TRIMPEX) 100 MG tablet Comments:   Reason for Stopping:         vitamins  A,C,E-zinc-copper (PRESERVISION AREDS) [phone] unit-mg-unit cap Comments:   Reason for Stopping:                     Consults       PHARMACY TO DOSE VANCO  PHARMACY TO DOSE VANCO  SPEECH LANGUAGE PATH ADULT IP CONSULT  PULMONARY IP CONSULT  CARE MANAGEMENT / SOCIAL WORK IP CONSULT  PALLIATIVE CARE ADULT IP CONSULT  GASTROENTEROLOGY IP CONSULT  SPIRITUAL HEALTH SERVICES IP CONSULT  SPIRITUAL HEALTH SERVICES IP CONSULT  INPATIENT HOSPICE ADULT CONSULT    Immunizations given this encounter     Most Recent Immunizations   Administered Date(s) Administered     COVID-19,PF,Pfizer (12+ Yrs) 12/29/2021     Flu, Unspecified 09/01/2019     Influenza (H1N1) 08/22/2016     Influenza (High Dose) 3 valent vaccine 09/18/2019     Influenza (IIV3) PF 09/19/2013     Influenza Vaccine IM > 6 months Valent IIV4 (Alfuria,Fluzone) 11/18/2021     Pneumo Conj 13-V (2010&after) 02/13/2019     TD (ADULT, 7+) 11/04/2010     Zoster vaccine, live 03/13/2008              SIGNIFICANT IMAGING FINDINGS     Results for orders placed or performed during the hospital encounter of 11/01/22   XR Chest Port 1 View    Impression    IMPRESSION: Sternotomy. New mild bibasilar pulmonary infiltrates concerning for pneumonia.   CT Chest Pulmonary Embolism w Contrast    Impression    IMPRESSION:  1.  No evidence for pulmonary embolism.   2.  Severe bilateral lower lobe pneumonia which may be secondary to aspiration, please  correlate clinically.   US Abdomen Limited    Impression    IMPRESSION:  1.  Unremarkable abdominal ultrasound, bowel gas does limit visualization of liver and pancreas.       XR Video Swallow with SLP or OT    Impression    IMPRESSION:  1.  Thin barium aspiration with weak cough.  2.  Additional aspiration secondary to the multicomponent barium stasis     Echocardiogram Complete   Result Value Ref Range    LVEF  65-70%        SIGNIFICANT LABORATORY FINDINGS     Most Recent 3 CBC's:Recent Labs   Lab Test 11/05/22 0458 11/04/22 0445 11/03/22 0451   WBC 11.9* 14.6* 15.2*   HGB 10.1* 10.5* 10.2*   MCV 98 98 100   * 478* 447     Most Recent 3 BMP's:Recent Labs   Lab Test 11/05/22  0909 11/05/22  0458 11/05/22 0446 11/04/22  2035 11/04/22  1703 11/04/22  1657 11/04/22  1510 11/04/22  0818 11/04/22  0445 11/03/22  0615 11/03/22 0451   NA  --  136  --   --   --   --   --   --  149*  --  144   POTASSIUM  --  3.6  --   --  4.1  --  3.7  --  3.3*  --  3.8   CHLORIDE  --  106  --   --   --   --   --   --  114*  --  111*   CO2  --  22  --   --   --   --   --   --  23  --  23   BUN  --  17  --   --   --   --   --   --  22  --  27   CR  --  0.76  --   --   --   --   --   --  0.85  --  0.85   ANIONGAP  --  8  --   --   --   --   --   --  12  --  10   PLACIDO  --  7.7*  --   --   --   --   --   --  8.2*  --  8.4*   * 135* 143*   < >  --    < >  --    < > 90   < > 59*    < > = values in this interval not displayed.     Most Recent 2 LFT's:Recent Labs   Lab Test 11/05/22 0458 11/03/22 0451   * 109*   ALT 99* 88*   ALKPHOS 231* 202*   BILITOTAL 0.5 0.5           Discharge Orders        Reason for your hospital stay    Pneumonia due to recurrent aspiration.     Activity    Your activity upon discharge: activity as tolerated     Follow-up and recommended labs and tests     No need for follow-up.     Diet    Follow this diet upon discharge: Orders Placed This Encounter      Regular Diet Adult (pleasure feeds)        Examination   Physical Exam   Pulse:  [90] 90  SpO2:  [87 %] 87 %  Wt Readings from Last 1 Encounters:   11/05/22 68 kg (150 lb)       Physical Exam  Constitutional:       General: He is not in acute distress.     Appearance: He is ill-appearing.   Cardiovascular:      Rate and Rhythm: Normal rate.      Heart sounds: No murmur heard.  Pulmonary:      Effort: Pulmonary effort is normal.      Comments: Bibasilar crackles.  Abdominal:      General: Abdomen is flat.      Palpations: Abdomen is soft.   Musculoskeletal:         General: No swelling.   Skin:     General: Skin is warm and dry.   Neurological:      General: No focal deficit present.      Mental Status: He is alert.         Please see EMR for more detailed significant labs, imaging, consultant notes etc.    I, LEONARDO LYNCH MD, personally saw the patient today and spent greater than 30 minutes discharging this patient.    LEONARDO LYNCH MD  [facility]    CC:Jeferson Scott

## 2022-11-06 NOTE — PROGRESS NOTES
SPIRITUAL HEALTH SERVICES NOTE  Hospital:  [facility]     SPIRITUAL CARE NOTE    The patient was moving into comfort care and the family requested staff  to pray for patient and with family.  Patient conversed about his life experiences and included his story of how he was  to his college [email or web address] for over 70 years.       provided a prayer service with scripture, anointing and family conversation.  The patient included his own words at the closing of the prayer service and the experience was very beautiful.  The patient was appreciative of the service and his wife and family were as well.  The patient said he was looking forward to being with Elder.     provided compassionate listening, companionship, pastoral conversation, prayer and blessing.  The health care providers were compassionate and the family was very appreciative of their care.    Visit Length: 35 minutes     Plan of Care: Will remain available for further support as patient/family needs/desires.     JIHAN Sheikh.  Staff      Cell:  [phone]

## 2022-11-07 NOTE — PROGRESS NOTES
SPIRITUAL HEALTH SERVICES Progress Note  WW  328    Visited with Rahul and his grand dtrs to follow up and assess needs.  Rahul was fatigued and expressed feeling confused. Grand-dtrs shared they were coping well  Writer provided support though encouraging emotional reflection and validating expression of grief.  Planned discharge for 1430 today, writer will continue to assess and support as requested     Saurabh Salcedo M.Div., [facility]  Staff    [phone]

## 2022-11-07 NOTE — PLAN OF CARE
Patient was supposed to discharge home to hospice today at 5:30 with stretcher ride set up. Family came to room and informed writer that hospital bed that was supposed to be delivered before 4pm has not shown up and that the contact Marcelle (daughter) has for hospice is not returning call. At time of note no hospital bed has been delivered for patient, hospice nurse expected to be at home around 6:30 to assess patient and speak with family. Family unable to get morphine prescription filled for patient as pharmacy has shortage and unable to find a pharmacy that has the medication. Plan is for patient to stay the night until 11/7 with a stretcher ride set up for 2:30pm. This allows the family time to get hospital bed and to get pain prescriptions.     Problem: Plan of Care - These are the overarching goals to be used throughout the patient stay.    Goal: Readiness for Transition of Care  Outcome: Progressing

## 2022-11-07 NOTE — SIGNIFICANT EVENT
Significant Event Note    Time of event: 6:45 PM November 6, 2022    Description of event:  Patient unable to discharge home on hospice this evening.     Plan:  Ordered oral ativan prn for anxiety and oral morphine prn for pain/ dyspnea        Peg Khan MD

## 2022-11-07 NOTE — PLAN OF CARE
Goal Outcome Evaluation: Discharge    Patient discharging home with family. Hospice following. EMS providing transportation. Granddaughters at bedside. After visit summary reviewed with patient and family. Questions answered, all personal belongings returned.

## 2022-11-07 NOTE — PROGRESS NOTES
Call received from patient's Unit RN stating patient's family had not heard from [facility] who was supposed to deliver a hospital bed to patient's home by 1600. Unit RN had called [facility] without any response.    1750  -- This writer called [facility] ([phone]) and spoke with answering service who would relay message to after hours staff person.   1830  --  Writer spoke with unit RN who also didn't get response from [facility]. However RN did hear from patient's son who was able to contact a person at a different [facility] and he was informed that the bed would not be delivered till at least 2100.    Another concern was that pharmacies didn't have the medication strength prescribed due to a shortage. Patient therefore, remains at [facility] and will discharge on 11/07/22 to allow family to have equipment and medications in place when patient arrives home. No response received from [facility] to this writer's call at this time.    BRIANA JHA, BLANCO/CM

## 2022-11-07 NOTE — PROGRESS NOTES
Care Management Follow Up    Length of Stay (days): 6    Expected Discharge Date: 11/07/2022     Concerns to be Addressed: Discharge Planning    Patient plan of care discussed at interdisciplinary rounds: Yes    Anticipated Discharge Disposition: Home with Hospice   Anticipated Discharge Services: None  Anticipated Discharge DME: None    Education Provided on the Discharge Plan: Yes      Patient/Family in Agreement with the Plan: Yes    Referrals Placed by CM/SW: Hospice  Private pay costs discussed: Not applicable    Additional Information:  [facility] contacted [facility] ([phone]) to follow up on delay of hospital bed delivery. Hospice staff will contact [employer] to determine status and will advise.    8:16 AM  Hospice returned call, bed was delivered later last night. Advised them that Pt is scheduled for stretcher transport at 2:30pm today. Spoke to liaison Julia ([phone]) regarding plan. She is requesting that physician send updated stat order for morphine Rx (20mg/mL liquid) to [facility] at fax [phone] so that it can be delivered to home this morning. Clinical manager will meet family upon Pt's return home for intake this afternoon. Hospice will supply all other medications.     8:44 AM  Hospice liaison advised that address listed in Pt chart is not his destination. Address for pharmacy delivery and transport should be [address] [place] [address], [facility] [address].    10:12 AM  Updated [facility] Transport of corrected destination address in [place]. Stretcher ride confirmed for 2:30pm. PCS updated and completed.    Anitra Sargent, LISA

## 2022-11-07 NOTE — PLAN OF CARE
"  Problem: Risk for Delirium  Goal: Improved Sleep  Outcome: Not Progressing    Problem: Pain Chronic (Persistent)  Goal: Optimal Pain Control and Function  Outcome: Progressing    Patient did not sleep well last night.  Awake and thinking most of the night.  Grand daughter at bedside.  Complained of generalized pain and rated it a \"4\" on a scale of 0-10.  Morphine 2 mg po given at 0611.  Patient observed sleeping comfortably shortly after med administration.  Refused turns this shift.  States he is comfortable.  Patient to go home on hospice today.   is scheduled for 1430.  All cares performed as ordered and explained.  Will continue to monitor and follow plan of care.                                  "